# Patient Record
Sex: MALE | Race: ASIAN | NOT HISPANIC OR LATINO | URBAN - METROPOLITAN AREA
[De-identification: names, ages, dates, MRNs, and addresses within clinical notes are randomized per-mention and may not be internally consistent; named-entity substitution may affect disease eponyms.]

---

## 2019-02-01 ENCOUNTER — OUTPATIENT (OUTPATIENT)
Dept: OUTPATIENT SERVICES | Facility: HOSPITAL | Age: 69
LOS: 1 days | End: 2019-02-01
Payer: MEDICAID

## 2019-02-01 PROCEDURE — G9001: CPT

## 2019-02-08 ENCOUNTER — EMERGENCY (EMERGENCY)
Facility: HOSPITAL | Age: 69
LOS: 1 days | Discharge: HOME | End: 2019-02-08
Attending: EMERGENCY MEDICINE
Payer: MEDICARE

## 2019-02-08 VITALS
OXYGEN SATURATION: 99 % | SYSTOLIC BLOOD PRESSURE: 150 MMHG | TEMPERATURE: 99 F | HEART RATE: 85 BPM | DIASTOLIC BLOOD PRESSURE: 72 MMHG | WEIGHT: 119.93 LBS | HEIGHT: 62 IN | RESPIRATION RATE: 16 BRPM

## 2019-02-08 LAB
ALBUMIN SERPL ELPH-MCNC: 3.4 G/DL — LOW (ref 3.5–5)
ALP SERPL-CCNC: 85 U/L — SIGNIFICANT CHANGE UP (ref 40–120)
ALT FLD-CCNC: 29 U/L DA — SIGNIFICANT CHANGE UP (ref 10–60)
ANION GAP SERPL CALC-SCNC: 7 MMOL/L — SIGNIFICANT CHANGE UP (ref 5–17)
AST SERPL-CCNC: 17 U/L — SIGNIFICANT CHANGE UP (ref 10–40)
BASOPHILS # BLD AUTO: 0.1 K/UL — SIGNIFICANT CHANGE UP (ref 0–0.2)
BASOPHILS NFR BLD AUTO: 1.1 % — SIGNIFICANT CHANGE UP (ref 0–2)
BILIRUB SERPL-MCNC: 0.5 MG/DL — SIGNIFICANT CHANGE UP (ref 0.2–1.2)
BUN SERPL-MCNC: 18 MG/DL — SIGNIFICANT CHANGE UP (ref 7–18)
CALCIUM SERPL-MCNC: 8.9 MG/DL — SIGNIFICANT CHANGE UP (ref 8.4–10.5)
CHLORIDE SERPL-SCNC: 101 MMOL/L — SIGNIFICANT CHANGE UP (ref 96–108)
CO2 SERPL-SCNC: 25 MMOL/L — SIGNIFICANT CHANGE UP (ref 22–31)
CREAT SERPL-MCNC: 1.35 MG/DL — HIGH (ref 0.5–1.3)
EOSINOPHIL # BLD AUTO: 0.2 K/UL — SIGNIFICANT CHANGE UP (ref 0–0.5)
EOSINOPHIL NFR BLD AUTO: 1.8 % — SIGNIFICANT CHANGE UP (ref 0–6)
GLUCOSE SERPL-MCNC: 253 MG/DL — HIGH (ref 70–99)
HCT VFR BLD CALC: 40.9 % — SIGNIFICANT CHANGE UP (ref 39–50)
HGB BLD-MCNC: 13.3 G/DL — SIGNIFICANT CHANGE UP (ref 13–17)
LYMPHOCYTES # BLD AUTO: 1.1 K/UL — SIGNIFICANT CHANGE UP (ref 1–3.3)
LYMPHOCYTES # BLD AUTO: 8.9 % — LOW (ref 13–44)
MCHC RBC-ENTMCNC: 29.2 PG — SIGNIFICANT CHANGE UP (ref 27–34)
MCHC RBC-ENTMCNC: 32.6 GM/DL — SIGNIFICANT CHANGE UP (ref 32–36)
MCV RBC AUTO: 89.6 FL — SIGNIFICANT CHANGE UP (ref 80–100)
MONOCYTES # BLD AUTO: 0.9 K/UL — SIGNIFICANT CHANGE UP (ref 0–0.9)
MONOCYTES NFR BLD AUTO: 7.8 % — SIGNIFICANT CHANGE UP (ref 2–14)
NEUTROPHILS # BLD AUTO: 9.6 K/UL — HIGH (ref 1.8–7.4)
NEUTROPHILS NFR BLD AUTO: 80.3 % — HIGH (ref 43–77)
PLATELET # BLD AUTO: 295 K/UL — SIGNIFICANT CHANGE UP (ref 150–400)
POTASSIUM SERPL-MCNC: 4.3 MMOL/L — SIGNIFICANT CHANGE UP (ref 3.5–5.3)
POTASSIUM SERPL-SCNC: 4.3 MMOL/L — SIGNIFICANT CHANGE UP (ref 3.5–5.3)
PROT SERPL-MCNC: 7.9 G/DL — SIGNIFICANT CHANGE UP (ref 6–8.3)
RBC # BLD: 4.57 M/UL — SIGNIFICANT CHANGE UP (ref 4.2–5.8)
RBC # FLD: 11.5 % — SIGNIFICANT CHANGE UP (ref 10.3–14.5)
SODIUM SERPL-SCNC: 133 MMOL/L — LOW (ref 135–145)
URATE SERPL-MCNC: 7.6 MG/DL — SIGNIFICANT CHANGE UP (ref 3.4–8.8)
WBC # BLD: 11.9 K/UL — HIGH (ref 3.8–10.5)
WBC # FLD AUTO: 11.9 K/UL — HIGH (ref 3.8–10.5)

## 2019-02-08 PROCEDURE — 85027 COMPLETE CBC AUTOMATED: CPT

## 2019-02-08 PROCEDURE — 80053 COMPREHEN METABOLIC PANEL: CPT

## 2019-02-08 PROCEDURE — 84550 ASSAY OF BLOOD/URIC ACID: CPT

## 2019-02-08 PROCEDURE — 99283 EMERGENCY DEPT VISIT LOW MDM: CPT

## 2019-02-08 PROCEDURE — 99284 EMERGENCY DEPT VISIT MOD MDM: CPT

## 2019-02-08 PROCEDURE — 36415 COLL VENOUS BLD VENIPUNCTURE: CPT

## 2019-02-08 RX ORDER — CEPHALEXIN 500 MG
250 CAPSULE ORAL ONCE
Qty: 0 | Refills: 0 | Status: COMPLETED | OUTPATIENT
Start: 2019-02-08 | End: 2019-02-08

## 2019-02-08 RX ORDER — CEPHALEXIN 500 MG
1 CAPSULE ORAL
Qty: 40 | Refills: 0 | OUTPATIENT
Start: 2019-02-08 | End: 2019-02-17

## 2019-02-08 RX ORDER — AZTREONAM 2 G
2 VIAL (EA) INJECTION
Qty: 40 | Refills: 0 | OUTPATIENT
Start: 2019-02-08 | End: 2019-02-17

## 2019-02-08 RX ADMIN — Medication 500 MILLIGRAM(S): at 14:52

## 2019-02-08 RX ADMIN — Medication 500 MILLIGRAM(S): at 12:07

## 2019-02-08 RX ADMIN — Medication 2 TABLET(S): at 14:32

## 2019-02-08 RX ADMIN — Medication 250 MILLIGRAM(S): at 14:32

## 2019-02-08 NOTE — ED PROVIDER NOTE - PROGRESS NOTE DETAILS
feels much better. given bilateral ankle, possible arthritis. pt reports standing up all day at work. foot edema asymmetric, redness and warmth of right foot so will also treat for cellulitis. pt has access to pmd and plans to follow up in 2-3 days for continuity. gave copy of results. discussed results, answered questions. return if not improving, new sx or unable to follow up.

## 2019-02-08 NOTE — ED PROVIDER NOTE - SKIN, MLM
Skin normal color for race, warm, dry and intact. No evidence of rash. Right foot trace pitting edema, warmth and erythema

## 2019-02-08 NOTE — ED PROVIDER NOTE - CARE PLAN
Principal Discharge DX:	Bilateral ankle pain, unspecified chronicity  Secondary Diagnosis:	Cellulitis of right lower extremity

## 2019-02-08 NOTE — ED ADULT NURSE NOTE - NSIMPLEMENTINTERV_GEN_ALL_ED
Implemented All Fall Risk Interventions:  [place] to call system. Call bell, personal items and telephone within reach. Instruct patient to call for assistance. Room bathroom lighting operational. Non-slip footwear when patient is off stretcher. Physically safe environment: no spills, clutter or unnecessary equipment. Stretcher in lowest position, wheels locked, appropriate side rails in place. Provide visual cue, wrist band, yellow gown, etc. Monitor gait and stability. Monitor for mental status changes and reorient to person, place, and time. Review medications for side effects contributing to fall risk. Reinforce activity limits and safety measures with patient and family.

## 2019-02-08 NOTE — ED PROVIDER NOTE - OBJECTIVE STATEMENT
67 y/o male with PMHx of HTN, HLD, DM (on insulin), kidney disease presents to the ED c/o worsening kathryn ankle pain x 15 days. Pt notes he had similar Sx in May 2018, was found to be gout. Pt notes current Sx are similar to past gout. Pt denies fever, chills, or any other complaints. Pt denies trauma or falls. NKDA.

## 2019-02-10 ENCOUNTER — EMERGENCY (EMERGENCY)
Facility: HOSPITAL | Age: 69
LOS: 1 days | Discharge: HOME | End: 2019-02-10
Attending: EMERGENCY MEDICINE
Payer: MEDICARE

## 2019-02-10 VITALS
HEART RATE: 84 BPM | SYSTOLIC BLOOD PRESSURE: 144 MMHG | HEIGHT: 62 IN | OXYGEN SATURATION: 97 % | DIASTOLIC BLOOD PRESSURE: 64 MMHG | WEIGHT: 119.93 LBS | TEMPERATURE: 98 F

## 2019-02-10 PROCEDURE — 99285 EMERGENCY DEPT VISIT HI MDM: CPT

## 2019-02-10 RX ORDER — SODIUM CHLORIDE 9 MG/ML
3 INJECTION INTRAMUSCULAR; INTRAVENOUS; SUBCUTANEOUS ONCE
Qty: 0 | Refills: 0 | Status: COMPLETED | OUTPATIENT
Start: 2019-02-10 | End: 2019-02-10

## 2019-02-11 VITALS
TEMPERATURE: 98 F | HEART RATE: 75 BPM | SYSTOLIC BLOOD PRESSURE: 117 MMHG | DIASTOLIC BLOOD PRESSURE: 61 MMHG | RESPIRATION RATE: 18 BRPM | OXYGEN SATURATION: 99 %

## 2019-02-11 DIAGNOSIS — Z90.49 ACQUIRED ABSENCE OF OTHER SPECIFIED PARTS OF DIGESTIVE TRACT: Chronic | ICD-10-CM

## 2019-02-11 PROBLEM — N28.9 DISORDER OF KIDNEY AND URETER, UNSPECIFIED: Chronic | Status: INACTIVE | Noted: 2019-02-08 | Resolved: 2019-02-11

## 2019-02-11 PROBLEM — M10.9 GOUT, UNSPECIFIED: Chronic | Status: INACTIVE | Noted: 2019-02-08 | Resolved: 2019-02-11

## 2019-02-11 PROBLEM — E78.5 HYPERLIPIDEMIA, UNSPECIFIED: Chronic | Status: INACTIVE | Noted: 2019-02-08 | Resolved: 2019-02-11

## 2019-02-11 PROBLEM — I10 ESSENTIAL (PRIMARY) HYPERTENSION: Chronic | Status: INACTIVE | Noted: 2019-02-08 | Resolved: 2019-02-11

## 2019-02-11 PROBLEM — E11.9 TYPE 2 DIABETES MELLITUS WITHOUT COMPLICATIONS: Chronic | Status: INACTIVE | Noted: 2019-02-08 | Resolved: 2019-02-11

## 2019-02-11 LAB
ALBUMIN SERPL ELPH-MCNC: 3.4 G/DL — LOW (ref 3.5–5)
ALP SERPL-CCNC: 68 U/L — SIGNIFICANT CHANGE UP (ref 40–120)
ALT FLD-CCNC: 22 U/L DA — SIGNIFICANT CHANGE UP (ref 10–60)
ANION GAP SERPL CALC-SCNC: 9 MMOL/L — SIGNIFICANT CHANGE UP (ref 5–17)
APPEARANCE UR: CLEAR — SIGNIFICANT CHANGE UP
APTT BLD: 33.5 SEC — SIGNIFICANT CHANGE UP (ref 27.5–36.3)
AST SERPL-CCNC: 24 U/L — SIGNIFICANT CHANGE UP (ref 10–40)
BASOPHILS # BLD AUTO: 0.1 K/UL — SIGNIFICANT CHANGE UP (ref 0–0.2)
BASOPHILS NFR BLD AUTO: 1 % — SIGNIFICANT CHANGE UP (ref 0–2)
BILIRUB SERPL-MCNC: 0.3 MG/DL — SIGNIFICANT CHANGE UP (ref 0.2–1.2)
BILIRUB UR-MCNC: NEGATIVE — SIGNIFICANT CHANGE UP
BUN SERPL-MCNC: 26 MG/DL — HIGH (ref 7–18)
CALCIUM SERPL-MCNC: 8 MG/DL — LOW (ref 8.4–10.5)
CHLORIDE SERPL-SCNC: 102 MMOL/L — SIGNIFICANT CHANGE UP (ref 96–108)
CO2 SERPL-SCNC: 23 MMOL/L — SIGNIFICANT CHANGE UP (ref 22–31)
COLOR SPEC: YELLOW — SIGNIFICANT CHANGE UP
CREAT SERPL-MCNC: 1.71 MG/DL — HIGH (ref 0.5–1.3)
DIFF PNL FLD: NEGATIVE — SIGNIFICANT CHANGE UP
EOSINOPHIL # BLD AUTO: 0.3 K/UL — SIGNIFICANT CHANGE UP (ref 0–0.5)
EOSINOPHIL NFR BLD AUTO: 2.9 % — SIGNIFICANT CHANGE UP (ref 0–6)
GLUCOSE SERPL-MCNC: 61 MG/DL — LOW (ref 70–99)
GLUCOSE UR QL: NEGATIVE — SIGNIFICANT CHANGE UP
HCT VFR BLD CALC: 34 % — LOW (ref 39–50)
HGB BLD-MCNC: 11.3 G/DL — LOW (ref 13–17)
INR BLD: 1.19 RATIO — HIGH (ref 0.88–1.16)
KETONES UR-MCNC: NEGATIVE — SIGNIFICANT CHANGE UP
LEUKOCYTE ESTERASE UR-ACNC: NEGATIVE — SIGNIFICANT CHANGE UP
LIDOCAIN IGE QN: 92 U/L — SIGNIFICANT CHANGE UP (ref 73–393)
LYMPHOCYTES # BLD AUTO: 1.3 K/UL — SIGNIFICANT CHANGE UP (ref 1–3.3)
LYMPHOCYTES # BLD AUTO: 13.2 % — SIGNIFICANT CHANGE UP (ref 13–44)
MCHC RBC-ENTMCNC: 29.2 PG — SIGNIFICANT CHANGE UP (ref 27–34)
MCHC RBC-ENTMCNC: 33.2 GM/DL — SIGNIFICANT CHANGE UP (ref 32–36)
MCV RBC AUTO: 87.9 FL — SIGNIFICANT CHANGE UP (ref 80–100)
MONOCYTES # BLD AUTO: 0.9 K/UL — SIGNIFICANT CHANGE UP (ref 0–0.9)
MONOCYTES NFR BLD AUTO: 9.2 % — SIGNIFICANT CHANGE UP (ref 2–14)
NEUTROPHILS # BLD AUTO: 7.3 K/UL — SIGNIFICANT CHANGE UP (ref 1.8–7.4)
NEUTROPHILS NFR BLD AUTO: 73.7 % — SIGNIFICANT CHANGE UP (ref 43–77)
NITRITE UR-MCNC: NEGATIVE — SIGNIFICANT CHANGE UP
PH UR: 6.5 — SIGNIFICANT CHANGE UP (ref 5–8)
PLATELET # BLD AUTO: 310 K/UL — SIGNIFICANT CHANGE UP (ref 150–400)
POTASSIUM SERPL-MCNC: 4 MMOL/L — SIGNIFICANT CHANGE UP (ref 3.5–5.3)
POTASSIUM SERPL-SCNC: 4 MMOL/L — SIGNIFICANT CHANGE UP (ref 3.5–5.3)
PROT SERPL-MCNC: 6.9 G/DL — SIGNIFICANT CHANGE UP (ref 6–8.3)
PROT UR-MCNC: NEGATIVE — SIGNIFICANT CHANGE UP
PROTHROM AB SERPL-ACNC: 13.3 SEC — HIGH (ref 10–12.9)
RBC # BLD: 3.87 M/UL — LOW (ref 4.2–5.8)
RBC # FLD: 10.8 % — SIGNIFICANT CHANGE UP (ref 10.3–14.5)
SODIUM SERPL-SCNC: 134 MMOL/L — LOW (ref 135–145)
SP GR SPEC: 1.01 — SIGNIFICANT CHANGE UP (ref 1.01–1.02)
TROPONIN I SERPL-MCNC: <0.015 NG/ML — SIGNIFICANT CHANGE UP (ref 0–0.04)
UROBILINOGEN FLD QL: NEGATIVE — SIGNIFICANT CHANGE UP
WBC # BLD: 9.8 K/UL — SIGNIFICANT CHANGE UP (ref 3.8–10.5)
WBC # FLD AUTO: 9.8 K/UL — SIGNIFICANT CHANGE UP (ref 3.8–10.5)

## 2019-02-11 PROCEDURE — 74177 CT ABD & PELVIS W/CONTRAST: CPT | Mod: 26

## 2019-02-11 PROCEDURE — 36415 COLL VENOUS BLD VENIPUNCTURE: CPT

## 2019-02-11 PROCEDURE — 99284 EMERGENCY DEPT VISIT MOD MDM: CPT | Mod: 25

## 2019-02-11 PROCEDURE — 93005 ELECTROCARDIOGRAM TRACING: CPT

## 2019-02-11 PROCEDURE — 84484 ASSAY OF TROPONIN QUANT: CPT

## 2019-02-11 PROCEDURE — 85027 COMPLETE CBC AUTOMATED: CPT

## 2019-02-11 PROCEDURE — 74177 CT ABD & PELVIS W/CONTRAST: CPT

## 2019-02-11 PROCEDURE — 81003 URINALYSIS AUTO W/O SCOPE: CPT

## 2019-02-11 PROCEDURE — 85730 THROMBOPLASTIN TIME PARTIAL: CPT

## 2019-02-11 PROCEDURE — 85610 PROTHROMBIN TIME: CPT

## 2019-02-11 PROCEDURE — 87086 URINE CULTURE/COLONY COUNT: CPT

## 2019-02-11 PROCEDURE — 80053 COMPREHEN METABOLIC PANEL: CPT

## 2019-02-11 PROCEDURE — 96374 THER/PROPH/DIAG INJ IV PUSH: CPT | Mod: XU

## 2019-02-11 PROCEDURE — 83690 ASSAY OF LIPASE: CPT

## 2019-02-11 RX ORDER — MORPHINE SULFATE 50 MG/1
2 CAPSULE, EXTENDED RELEASE ORAL ONCE
Qty: 0 | Refills: 0 | Status: DISCONTINUED | OUTPATIENT
Start: 2019-02-11 | End: 2019-02-11

## 2019-02-11 RX ORDER — IOHEXOL 300 MG/ML
30 INJECTION, SOLUTION INTRAVENOUS ONCE
Qty: 0 | Refills: 0 | Status: COMPLETED | OUTPATIENT
Start: 2019-02-11 | End: 2019-02-11

## 2019-02-11 RX ORDER — SODIUM CHLORIDE 9 MG/ML
1000 INJECTION INTRAMUSCULAR; INTRAVENOUS; SUBCUTANEOUS ONCE
Qty: 0 | Refills: 0 | Status: COMPLETED | OUTPATIENT
Start: 2019-02-11 | End: 2019-02-11

## 2019-02-11 RX ORDER — POLYETHYLENE GLYCOL 3350 17 G/17G
17 POWDER, FOR SOLUTION ORAL
Qty: 238 | Refills: 0 | OUTPATIENT
Start: 2019-02-11 | End: 2019-02-20

## 2019-02-11 RX ADMIN — SODIUM CHLORIDE 1000 MILLILITER(S): 9 INJECTION INTRAMUSCULAR; INTRAVENOUS; SUBCUTANEOUS at 03:56

## 2019-02-11 RX ADMIN — MORPHINE SULFATE 2 MILLIGRAM(S): 50 CAPSULE, EXTENDED RELEASE ORAL at 03:55

## 2019-02-11 RX ADMIN — IOHEXOL 30 MILLILITER(S): 300 INJECTION, SOLUTION INTRAVENOUS at 02:11

## 2019-02-11 RX ADMIN — SODIUM CHLORIDE 3 MILLILITER(S): 9 INJECTION INTRAMUSCULAR; INTRAVENOUS; SUBCUTANEOUS at 02:03

## 2019-02-11 RX ADMIN — Medication 1 ENEMA: at 04:53

## 2019-02-11 RX ADMIN — MORPHINE SULFATE 2 MILLIGRAM(S): 50 CAPSULE, EXTENDED RELEASE ORAL at 04:09

## 2019-02-11 NOTE — ED ADULT NURSE NOTE - NSIMPLEMENTINTERV_GEN_ALL_ED
Implemented All Universal Safety Interventions:  [place] to call system. Call bell, personal items and telephone within reach. Instruct patient to call for assistance. Room bathroom lighting operational. Non-slip footwear when patient is off stretcher. Physically safe environment: no spills, clutter or unnecessary equipment. Stretcher in lowest position, wheels locked, appropriate side rails in place.

## 2019-02-11 NOTE — ED PROVIDER NOTE - PROGRESS NOTE DETAILS
labs unremarkable, UA neg, CT A/P shows no acute abnormality  discussed above with patient  in the setting of constipation and stool evident on CT, given fleet enema with stool output. Pt stable for discharge to f/u with PMD.

## 2019-02-11 NOTE — ED PROVIDER NOTE - OBJECTIVE STATEMENT
67 y/o M w/ PMHx of DM, HTN, HLD, gout and PSHx of cholecystectomy 10 years ago presents to ED w/ c/o abd bloating for 2 days w/ associated discomfort. Pt has no hx of symptoms like this. Endorses nausea, denies vomiting or diarrhea. Pt's last BM was two days ago, described normal. Pt denies any fever, cough, dysuria, hematuria, or any other complaints.

## 2019-02-11 NOTE — ED PROVIDER NOTE - MEDICAL DECISION MAKING DETAILS
67 y/o M here w/ abd distension, pain and constipation. Will obtain labs, UA, CT-scan of abd due to distension. Will give morphine for pain and re-assess.

## 2019-02-12 DIAGNOSIS — Z71.89 OTHER SPECIFIED COUNSELING: ICD-10-CM

## 2019-02-12 LAB
CULTURE RESULTS: NO GROWTH — SIGNIFICANT CHANGE UP
SPECIMEN SOURCE: SIGNIFICANT CHANGE UP

## 2019-02-27 ENCOUNTER — EMERGENCY (EMERGENCY)
Facility: HOSPITAL | Age: 69
LOS: 1 days | Discharge: HOME | End: 2019-02-27
Admitting: EMERGENCY MEDICINE
Payer: MEDICARE

## 2019-02-27 VITALS
TEMPERATURE: 98 F | HEART RATE: 74 BPM | DIASTOLIC BLOOD PRESSURE: 68 MMHG | SYSTOLIC BLOOD PRESSURE: 130 MMHG | RESPIRATION RATE: 16 BRPM | OXYGEN SATURATION: 100 %

## 2019-02-27 VITALS
OXYGEN SATURATION: 100 % | WEIGHT: 119.93 LBS | TEMPERATURE: 99 F | DIASTOLIC BLOOD PRESSURE: 65 MMHG | SYSTOLIC BLOOD PRESSURE: 135 MMHG | RESPIRATION RATE: 16 BRPM | HEART RATE: 75 BPM

## 2019-02-27 DIAGNOSIS — Z90.49 ACQUIRED ABSENCE OF OTHER SPECIFIED PARTS OF DIGESTIVE TRACT: Chronic | ICD-10-CM

## 2019-02-27 LAB
ALBUMIN SERPL ELPH-MCNC: 4.1 G/DL — SIGNIFICANT CHANGE UP (ref 3.3–5)
ALP SERPL-CCNC: 78 U/L — SIGNIFICANT CHANGE UP (ref 40–120)
ALT FLD-CCNC: 22 U/L — SIGNIFICANT CHANGE UP (ref 4–41)
ANION GAP SERPL CALC-SCNC: 13 MMO/L — SIGNIFICANT CHANGE UP (ref 7–14)
AST SERPL-CCNC: 23 U/L — SIGNIFICANT CHANGE UP (ref 4–40)
BASOPHILS # BLD AUTO: 0.08 K/UL — SIGNIFICANT CHANGE UP (ref 0–0.2)
BASOPHILS NFR BLD AUTO: 0.9 % — SIGNIFICANT CHANGE UP (ref 0–2)
BILIRUB SERPL-MCNC: 0.4 MG/DL — SIGNIFICANT CHANGE UP (ref 0.2–1.2)
BUN SERPL-MCNC: 19 MG/DL — SIGNIFICANT CHANGE UP (ref 7–23)
CALCIUM SERPL-MCNC: 9.8 MG/DL — SIGNIFICANT CHANGE UP (ref 8.4–10.5)
CHLORIDE SERPL-SCNC: 99 MMOL/L — SIGNIFICANT CHANGE UP (ref 98–107)
CO2 SERPL-SCNC: 24 MMOL/L — SIGNIFICANT CHANGE UP (ref 22–31)
CREAT SERPL-MCNC: 1.43 MG/DL — HIGH (ref 0.5–1.3)
EOSINOPHIL # BLD AUTO: 0.45 K/UL — SIGNIFICANT CHANGE UP (ref 0–0.5)
EOSINOPHIL NFR BLD AUTO: 5 % — SIGNIFICANT CHANGE UP (ref 0–6)
GLUCOSE SERPL-MCNC: 109 MG/DL — HIGH (ref 70–99)
HCT VFR BLD CALC: 41.7 % — SIGNIFICANT CHANGE UP (ref 39–50)
HGB BLD-MCNC: 13.3 G/DL — SIGNIFICANT CHANGE UP (ref 13–17)
IMM GRANULOCYTES NFR BLD AUTO: 0.3 % — SIGNIFICANT CHANGE UP (ref 0–1.5)
LDH SERPL L TO P-CCNC: 206 U/L — SIGNIFICANT CHANGE UP (ref 135–225)
LYMPHOCYTES # BLD AUTO: 1.22 K/UL — SIGNIFICANT CHANGE UP (ref 1–3.3)
LYMPHOCYTES # BLD AUTO: 13.6 % — SIGNIFICANT CHANGE UP (ref 13–44)
MCHC RBC-ENTMCNC: 28.9 PG — SIGNIFICANT CHANGE UP (ref 27–34)
MCHC RBC-ENTMCNC: 31.9 % — LOW (ref 32–36)
MCV RBC AUTO: 90.7 FL — SIGNIFICANT CHANGE UP (ref 80–100)
MONOCYTES # BLD AUTO: 0.83 K/UL — SIGNIFICANT CHANGE UP (ref 0–0.9)
MONOCYTES NFR BLD AUTO: 9.2 % — SIGNIFICANT CHANGE UP (ref 2–14)
NEUTROPHILS # BLD AUTO: 6.37 K/UL — SIGNIFICANT CHANGE UP (ref 1.8–7.4)
NEUTROPHILS NFR BLD AUTO: 71 % — SIGNIFICANT CHANGE UP (ref 43–77)
NRBC # FLD: 0 K/UL — LOW (ref 25–125)
PLATELET # BLD AUTO: 221 K/UL — SIGNIFICANT CHANGE UP (ref 150–400)
PMV BLD: 10.9 FL — SIGNIFICANT CHANGE UP (ref 7–13)
POTASSIUM SERPL-MCNC: 4.8 MMOL/L — SIGNIFICANT CHANGE UP (ref 3.5–5.3)
POTASSIUM SERPL-SCNC: 4.8 MMOL/L — SIGNIFICANT CHANGE UP (ref 3.5–5.3)
PROT SERPL-MCNC: 7.1 G/DL — SIGNIFICANT CHANGE UP (ref 6–8.3)
RBC # BLD: 4.6 M/UL — SIGNIFICANT CHANGE UP (ref 4.2–5.8)
RBC # FLD: 12.7 % — SIGNIFICANT CHANGE UP (ref 10.3–14.5)
SODIUM SERPL-SCNC: 136 MMOL/L — SIGNIFICANT CHANGE UP (ref 135–145)
WBC # BLD: 8.98 K/UL — SIGNIFICANT CHANGE UP (ref 3.8–10.5)
WBC # FLD AUTO: 8.98 K/UL — SIGNIFICANT CHANGE UP (ref 3.8–10.5)

## 2019-02-27 PROCEDURE — 99284 EMERGENCY DEPT VISIT MOD MDM: CPT

## 2019-02-27 PROCEDURE — 99284 EMERGENCY DEPT VISIT MOD MDM: CPT | Mod: GC

## 2019-02-27 PROCEDURE — 70486 CT MAXILLOFACIAL W/O DYE: CPT | Mod: 26

## 2019-02-27 NOTE — ED PROVIDER NOTE - PROGRESS NOTE DETAILS
ALEX Arias: Spoke w/ oncology fellow Dr. Barnes who recommended CT max face to better evaluate the mass. Was unable to use contrast due to pts renal dysfunction. Pt seen by oncology who recommended ENT consult to help establish f/u. Spoke w/ ENT resident, state the most they can do is email the head and neck care coordinator Lula Zimmerman to help arrange follow up. Asked that I be cc'ed on the email to follow up whether pt was actually able to schedule an appt. Discussed ct max face results with pt and family, understand we have concerns this may be malignant. Will give both ENT clinic and oncology numbers for outpatient follow up. All questions answered

## 2019-02-27 NOTE — CONSULT NOTE ADULT - ATTENDING COMMENTS
Pt with an enlarging preauricular mass concerning for malignancy. CT imaging reviewed. Agree with MRI and CT neck. He has CKD and therefore contrast cant be administered. ENT eval for tissue bx.

## 2019-02-27 NOTE — CONSULT NOTE ADULT - ASSESSMENT
69 y/o M PMHx HTN, HLD, DM, renal dz here c/o left pre-auricular mass x  1 years.    # Pre-auricular mass  - L periauricular 1-2 cm, fixed, hard nodule. No erythema, no exudates, no ear drainage. No palpable cervical LAD.  - CT w/o contrast showed transpatial mass centered along the left superficial parotid lobe with findings suspicious for perineural spread of disease and dermal involvement of disease.  - Pt will need bx of the mass.  -pt's son tried to set up outpt appointment with ENT but the earliest appointment available was in April 2019  -please consult ENT to set up out patient biopsy within the next 2 weeks.      David Barnes MD.  Heme-Onc fellow, PGY 4  [phone]; [id number] 69 y/o M PMHx HTN, HLD, DM, renal dz here c/o left pre-auricular mass x  1 years.    # Pre-auricular mass  -Exam showed L preauricular 1-2 cm, fixed, hard nodule. No erythema, no exudates, no ear drainage. No palpable cervical LAD.  -CT w/o contrast showed transpatial mass centered along the left superficial parotid lobe with findings suspicious for perineural spread of disease and dermal involvement of disease.  -CBC WNL with normal differential, normal LDH unlikely lymphoma.    -Pt will need bx of the mass.  -pt's son tried to set up outpt appointment with ENT but the earliest appointment available was in April 2019  -please consult ENT to set up out patient biopsy within the next 2 weeks.      David Barnes MD.  Heme-Onc fellow, PGY 4  [phone]; [id number]

## 2019-02-27 NOTE — ED PROVIDER NOTE - NSFOLLOWUPINSTRUCTIONS_ED_ALL_ED_FT
See your primary care doctor within 24-48 hours for a post hospital visit, bring copies of all reports with you. The "head and neck surgery" coordinator will call you to schedule an appointment to see a surgeon. If they do not call, please contact the ENT clinic at [phone] to schedule an appointment. You may also call [phone] to make an oncology appointment. Return to the ER for worsening symptoms, fevers or any other concerns.

## 2019-02-27 NOTE — CONSULT NOTE ADULT - SUBJECTIVE AND OBJECTIVE BOX
69 y/o M PMHx HTN, HLD, DM, renal dz here c/o left pre-auricular mass x  1 years. The size increased rapidly recently with worsening pain to the area. Saw his PMD a couple of days ago, who recommended he f/u w/ ENT for bx and further evaluation. Pt and son tried to schedule an appointment, however pt has Medicaid and he next appt to see an ENT who accepts Medicaid is April/May. Pt came here for help facilitating work up. Pt denies fevers, chills, night sweats, weight loss or any other complaints.      Allergies  No Known Allergies    PAST MEDICAL & SURGICAL HISTORY:  Kidney disease  DM (diabetes mellitus)  Hypertension, unspecified type      FAMILY HISTORY:  Brother had sinonasal cancer    Social History:  From [facility], never smoker, never drinker.    REVIEW OF SYSTEMS:    CONSTITUTIONAL: No weakness, fevers or chills  EYES/ENT: No visual changes, no throat pain   RESPIRATORY: No cough, wheezing, hemoptysis; No shortness of breath  CARDIOVASCULAR: No chest pain or palpitations  GASTROINTESTINAL: No abdominal pain, nausea, vomiting, or hematemesis; No diarrhea or constipation. No melena or hematochezia.  GENITOURINARY: No dysuria, frequency or hematuria  MUSCULOSKELETAL: No joint pain.  NEUROLOGICAL: No dizziness, numbness, or weakness  SKIN: No itching, burning, rashes, or lesions   All other review of systems is negative unless indicated above.    VITAL SIGNS:  Vital Signs Last 24 Hrs  T(C): 36.8 (27 Feb 2019 16:02), Max: 37.1 (27 Feb 2019 11:02)  T(F): 98.3 (27 Feb 2019 16:02), Max: 98.7 (27 Feb 2019 11:02)  HR: 74 (27 Feb 2019 16:02) (74 - 75)  BP: 130/68 (27 Feb 2019 16:02) (130/68 - 135/65)  BP(mean): --  RR: 16 (27 Feb 2019 16:02) (16 - 16)  SpO2: 100% (27 Feb 2019 16:02) (100% - 100%)      PHYSICAL EXAM:     GENERAL: no acute distress  HEENT: L periauricular 1-2 cm, fixed,hard nodule. No erythema, no exudates, no eardrainage.  RESPIRATORY: LCTAB/L, no rhonchi, rales, or wheezing  CARDIOVASCULAR: RRR, no murmurs, gallops, rubs  ABDOMINAL: soft, non-tender, non-distended, positive bowel sounds   EXTREMITIES: no clubbing, cyanosis, or edema  NEUROLOGICAL: alert and oriented x 3, non-focal  SKIN: no rashes or lesions   MUSCULOSKELETAL: no gross joint deformity                          13.3   8.98  )-----------( 221      ( 27 Feb 2019 14:00 )             41.7     02-27    136  |  99  |  19  ----------------------------<  109<H>  4.8   |  24  |  1.43<H>    Ca    9.8      27 Feb 2019 14:00    TPro  7.1  /  Alb  4.1  /  TBili  0.4  /  DBili  x   /  AST  23  /  ALT  22  /  AlkPhos  78  02-27      MEDICATIONS  (STANDING):    < from: CT Maxillofacial No Cont (02.27.19 @ 15:32) >  Transpatial mass centered along the left superficial parotid lobe with   findings suspicious for perineural spread of disease and dermal   involvement of disease. An infectious process may be considered in the   appropriate clinical setting. Contrast-enhanced MR imaging is recommended   for further evaluation of local regional disease extent. Dedicated neck   imaging may be done to evaluate for neck lymphadenopathy.    < end of copied text > 67 y/o M PMHx HTN, HLD, DM, renal dz here c/o left pre-auricular mass x  1 years. The size increased rapidly recently with worsening pain to the area. Saw his PMD a couple of days ago, who recommended he f/u w/ ENT for bx and further evaluation. Pt and son tried to schedule an appointment, however pt has Medicaid and he next appt to see an ENT who accepts Medicaid is April/May. Pt came here for help facilitating work up. Pt denies fevers, chills, night sweats, weight loss or any other complaints.      Allergies  No Known Allergies    PAST MEDICAL & SURGICAL HISTORY:  Kidney disease  DM (diabetes mellitus)  Hypertension, unspecified type      FAMILY HISTORY:  Brother had sinonasal cancer  No cancer in mother or father    Social History:  From [facility], never smoker, never drinker.    REVIEW OF SYSTEMS:    CONSTITUTIONAL: No weakness, fevers or chills  EYES/ENT: No visual changes, no throat pain   RESPIRATORY: No cough, wheezing, hemoptysis; No shortness of breath  CARDIOVASCULAR: No chest pain or palpitations  GASTROINTESTINAL: No abdominal pain, nausea, vomiting, or hematemesis; No diarrhea or constipation. No melena or hematochezia.  GENITOURINARY: No dysuria, frequency or hematuria  MUSCULOSKELETAL: No joint pain.  NEUROLOGICAL: No dizziness, numbness, or weakness  SKIN: No itching, burning, rashes, or lesions   All other review of systems is negative unless indicated above.    VITAL SIGNS:  Vital Signs Last 24 Hrs  T(C): 36.8 (27 Feb 2019 16:02), Max: 37.1 (27 Feb 2019 11:02)  T(F): 98.3 (27 Feb 2019 16:02), Max: 98.7 (27 Feb 2019 11:02)  HR: 74 (27 Feb 2019 16:02) (74 - 75)  BP: 130/68 (27 Feb 2019 16:02) (130/68 - 135/65)  BP(mean): --  RR: 16 (27 Feb 2019 16:02) (16 - 16)  SpO2: 100% (27 Feb 2019 16:02) (100% - 100%)      PHYSICAL EXAM:     GENERAL: no acute distress  HEENT: L periauricular 1-2 cm, fixed,hard nodule. No erythema, no exudates, no eardrainage.  RESPIRATORY: LCTAB/L, no rhonchi, rales, or wheezing  CARDIOVASCULAR: RRR, no murmurs, gallops, rubs  ABDOMINAL: soft, non-tender, non-distended, positive bowel sounds   EXTREMITIES: no clubbing, cyanosis, or edema  NEUROLOGICAL: alert and oriented x 3, non-focal  SKIN: no rashes or lesions   MUSCULOSKELETAL: no gross joint deformity                          13.3   8.98  )-----------( 221      ( 27 Feb 2019 14:00 )             41.7     02-27    136  |  99  |  19  ----------------------------<  109<H>  4.8   |  24  |  1.43<H>    Ca    9.8      27 Feb 2019 14:00    TPro  7.1  /  Alb  4.1  /  TBili  0.4  /  DBili  x   /  AST  23  /  ALT  22  /  AlkPhos  78  02-27      MEDICATIONS  (STANDING):    < from: CT Maxillofacial No Cont (02.27.19 @ 15:32) >  Transpatial mass centered along the left superficial parotid lobe with   findings suspicious for perineural spread of disease and dermal   involvement of disease. An infectious process may be considered in the   appropriate clinical setting. Contrast-enhanced MR imaging is recommended   for further evaluation of local regional disease extent. Dedicated neck   imaging may be done to evaluate for neck lymphadenopathy.    < end of copied text >

## 2019-02-27 NOTE — ED PROVIDER NOTE - CLINICAL SUMMARY MEDICAL DECISION MAKING FREE TEXT BOX
67 y/o M w/ fixed preauricular mass, unable to schedule outpatient follow up. Will call heme/onc fellow for recommendations

## 2019-02-27 NOTE — ED PROVIDER NOTE - OBJECTIVE STATEMENT
69 y/o M PMHx HTN, HLD, DM, renal dz here c/o left pre-auricular mass x  1 years, with worsening pain to the area for months. Saw his PMD a couple of days ago, who recommended he f/u w/ ENT for bx and further evaluation. Pt and son tried to schedule an appointment, however pt has Medicaid and he next appt to see an ENT who accepts Medicaid is several months out. Pt came here for help facilitating work up. Pt denies fevers, chills, night sweats, weight loss or any other complaints.

## 2019-02-28 PROBLEM — E78.5 HYPERLIPIDEMIA, UNSPECIFIED: Chronic | Status: ACTIVE | Noted: 2019-02-11

## 2019-02-28 PROBLEM — N28.9 DISORDER OF KIDNEY AND URETER, UNSPECIFIED: Chronic | Status: ACTIVE | Noted: 2019-02-11

## 2019-02-28 PROBLEM — M10.9 GOUT, UNSPECIFIED: Chronic | Status: ACTIVE | Noted: 2019-02-11

## 2019-02-28 PROBLEM — I10 ESSENTIAL (PRIMARY) HYPERTENSION: Chronic | Status: ACTIVE | Noted: 2019-02-11

## 2019-03-06 PROBLEM — Z00.00 ENCOUNTER FOR PREVENTIVE HEALTH EXAMINATION: Status: ACTIVE | Noted: 2019-03-06

## 2019-03-07 PROBLEM — N28.9 DISORDER OF KIDNEY AND URETER, UNSPECIFIED: Chronic | Status: ACTIVE | Noted: 2019-02-27

## 2019-03-07 PROBLEM — E11.9 TYPE 2 DIABETES MELLITUS WITHOUT COMPLICATIONS: Chronic | Status: ACTIVE | Noted: 2019-02-27

## 2019-03-07 PROBLEM — I10 ESSENTIAL (PRIMARY) HYPERTENSION: Chronic | Status: ACTIVE | Noted: 2019-02-27

## 2019-03-14 ENCOUNTER — OUTPATIENT (OUTPATIENT)
Dept: OUTPATIENT SERVICES | Facility: HOSPITAL | Age: 69
LOS: 1 days | Discharge: HOME | End: 2019-03-14

## 2019-03-14 ENCOUNTER — APPOINTMENT (OUTPATIENT)
Dept: OTOLARYNGOLOGY | Facility: CLINIC | Age: 69
End: 2019-03-14
Payer: MEDICAID

## 2019-03-14 VITALS
HEIGHT: 62 IN | WEIGHT: 120 LBS | HEART RATE: 78 BPM | DIASTOLIC BLOOD PRESSURE: 71 MMHG | SYSTOLIC BLOOD PRESSURE: 154 MMHG | BODY MASS INDEX: 22.08 KG/M2

## 2019-03-14 DIAGNOSIS — Z90.49 ACQUIRED ABSENCE OF OTHER SPECIFIED PARTS OF DIGESTIVE TRACT: Chronic | ICD-10-CM

## 2019-03-14 DIAGNOSIS — Z83.3 FAMILY HISTORY OF DIABETES MELLITUS: ICD-10-CM

## 2019-03-14 DIAGNOSIS — Z86.79 PERSONAL HISTORY OF OTHER DISEASES OF THE CIRCULATORY SYSTEM: ICD-10-CM

## 2019-03-14 DIAGNOSIS — E11.9 TYPE 2 DIABETES MELLITUS W/OUT COMPLICATIONS: ICD-10-CM

## 2019-03-14 DIAGNOSIS — Z78.9 OTHER SPECIFIED HEALTH STATUS: ICD-10-CM

## 2019-03-14 DIAGNOSIS — Z86.39 PERSONAL HISTORY OF OTHER ENDOCRINE, NUTRITIONAL AND METABOLIC DISEASE: ICD-10-CM

## 2019-03-14 DIAGNOSIS — Z87.448 PERSONAL HISTORY OF OTHER DISEASES OF URINARY SYSTEM: ICD-10-CM

## 2019-03-14 PROCEDURE — 99204 OFFICE O/P NEW MOD 45 MIN: CPT

## 2019-03-14 RX ORDER — ATORVASTATIN CALCIUM 10 MG/1
10 TABLET, FILM COATED ORAL
Refills: 0 | Status: ACTIVE | COMMUNITY

## 2019-03-14 RX ORDER — ASPIRIN 81 MG
81 TABLET, DELAYED RELEASE (ENTERIC COATED) ORAL
Refills: 0 | Status: ACTIVE | COMMUNITY

## 2019-03-14 RX ORDER — INSULIN GLARGINE 100 [IU]/ML
INJECTION, SOLUTION SUBCUTANEOUS
Refills: 0 | Status: ACTIVE | COMMUNITY

## 2019-03-16 NOTE — HISTORY OF PRESENT ILLNESS
[de-identified] : 68 year old male referred by ED for left facial mass.  States left facial mass has been there for more than a year and has gotten larger.  On 2/27/19 went to ER due to swelling.  Sometimes it is painful.  CT scan conducted 2/27/19. No fever, weight loss or weakness.

## 2019-03-16 NOTE — PHYSICAL EXAM
[Midline] : trachea located in midline position [Normal] : no rashes [de-identified] : 3 cm semi fixed lesion of the L parotid, at the level of the ear lobe which infiltrates the skin.

## 2019-03-16 NOTE — REASON FOR VISIT
[Initial Evaluation] : an initial evaluation for [Spouse] : spouse [Family Member] : family member [FreeTextEntry2] : referred by ED for left facial mass

## 2019-03-19 ENCOUNTER — FORM ENCOUNTER (OUTPATIENT)
Age: 69
End: 2019-03-19

## 2019-03-19 DIAGNOSIS — K11.9 DISEASE OF SALIVARY GLAND, UNSPECIFIED: ICD-10-CM

## 2019-03-20 ENCOUNTER — OUTPATIENT (OUTPATIENT)
Dept: OUTPATIENT SERVICES | Facility: HOSPITAL | Age: 69
LOS: 1 days | End: 2019-03-20
Payer: MEDICARE

## 2019-03-20 ENCOUNTER — APPOINTMENT (OUTPATIENT)
Dept: ULTRASOUND IMAGING | Facility: IMAGING CENTER | Age: 69
End: 2019-03-20
Payer: MEDICAID

## 2019-03-20 ENCOUNTER — RESULT REVIEW (OUTPATIENT)
Age: 69
End: 2019-03-20

## 2019-03-20 DIAGNOSIS — K11.9 DISEASE OF SALIVARY GLAND, UNSPECIFIED: ICD-10-CM

## 2019-03-20 DIAGNOSIS — Z90.49 ACQUIRED ABSENCE OF OTHER SPECIFIED PARTS OF DIGESTIVE TRACT: Chronic | ICD-10-CM

## 2019-03-20 PROCEDURE — 10005 FNA BX W/US GDN 1ST LES: CPT

## 2019-03-20 PROCEDURE — 88341 IMHCHEM/IMCYTCHM EA ADD ANTB: CPT

## 2019-03-20 PROCEDURE — 88342 IMHCHEM/IMCYTCHM 1ST ANTB: CPT | Mod: 26

## 2019-03-20 PROCEDURE — 88173 CYTOPATH EVAL FNA REPORT: CPT | Mod: 26

## 2019-03-20 PROCEDURE — 88173 CYTOPATH EVAL FNA REPORT: CPT

## 2019-03-20 PROCEDURE — 88341 IMHCHEM/IMCYTCHM EA ADD ANTB: CPT | Mod: 26

## 2019-03-20 PROCEDURE — 88342 IMHCHEM/IMCYTCHM 1ST ANTB: CPT

## 2019-03-20 PROCEDURE — 88305 TISSUE EXAM BY PATHOLOGIST: CPT | Mod: 26

## 2019-03-20 PROCEDURE — 88172 CYTP DX EVAL FNA 1ST EA SITE: CPT

## 2019-03-20 PROCEDURE — 88305 TISSUE EXAM BY PATHOLOGIST: CPT

## 2019-04-10 ENCOUNTER — OUTPATIENT (OUTPATIENT)
Dept: OUTPATIENT SERVICES | Facility: HOSPITAL | Age: 69
LOS: 1 days | End: 2019-04-10

## 2019-04-10 VITALS
HEART RATE: 69 BPM | TEMPERATURE: 97 F | HEIGHT: 60 IN | DIASTOLIC BLOOD PRESSURE: 68 MMHG | WEIGHT: 130.07 LBS | RESPIRATION RATE: 16 BRPM | SYSTOLIC BLOOD PRESSURE: 130 MMHG | OXYGEN SATURATION: 98 %

## 2019-04-10 DIAGNOSIS — K11.9 DISEASE OF SALIVARY GLAND, UNSPECIFIED: ICD-10-CM

## 2019-04-10 DIAGNOSIS — Z90.49 ACQUIRED ABSENCE OF OTHER SPECIFIED PARTS OF DIGESTIVE TRACT: Chronic | ICD-10-CM

## 2019-04-10 DIAGNOSIS — H26.9 UNSPECIFIED CATARACT: Chronic | ICD-10-CM

## 2019-04-10 DIAGNOSIS — Z95.0 PRESENCE OF CARDIAC PACEMAKER: Chronic | ICD-10-CM

## 2019-04-10 DIAGNOSIS — E11.9 TYPE 2 DIABETES MELLITUS WITHOUT COMPLICATIONS: ICD-10-CM

## 2019-04-10 DIAGNOSIS — Z95.0 PRESENCE OF CARDIAC PACEMAKER: ICD-10-CM

## 2019-04-10 DIAGNOSIS — I25.10 ATHEROSCLEROTIC HEART DISEASE OF NATIVE CORONARY ARTERY WITHOUT ANGINA PECTORIS: ICD-10-CM

## 2019-04-10 DIAGNOSIS — K08.89 OTHER SPECIFIED DISORDERS OF TEETH AND SUPPORTING STRUCTURES: ICD-10-CM

## 2019-04-10 LAB
ANION GAP SERPL CALC-SCNC: 12 MMO/L — SIGNIFICANT CHANGE UP (ref 7–14)
BLD GP AB SCN SERPL QL: NEGATIVE — SIGNIFICANT CHANGE UP
BUN SERPL-MCNC: 21 MG/DL — SIGNIFICANT CHANGE UP (ref 7–23)
CALCIUM SERPL-MCNC: 9.5 MG/DL — SIGNIFICANT CHANGE UP (ref 8.4–10.5)
CHLORIDE SERPL-SCNC: 101 MMOL/L — SIGNIFICANT CHANGE UP (ref 98–107)
CO2 SERPL-SCNC: 22 MMOL/L — SIGNIFICANT CHANGE UP (ref 22–31)
CREAT SERPL-MCNC: 1.23 MG/DL — SIGNIFICANT CHANGE UP (ref 0.5–1.3)
GLUCOSE SERPL-MCNC: 253 MG/DL — HIGH (ref 70–99)
HBA1C BLD-MCNC: 7.3 % — HIGH (ref 4–5.6)
HCT VFR BLD CALC: 39.9 % — SIGNIFICANT CHANGE UP (ref 39–50)
HGB BLD-MCNC: 13.2 G/DL — SIGNIFICANT CHANGE UP (ref 13–17)
MCHC RBC-ENTMCNC: 29.5 PG — SIGNIFICANT CHANGE UP (ref 27–34)
MCHC RBC-ENTMCNC: 33.1 % — SIGNIFICANT CHANGE UP (ref 32–36)
MCV RBC AUTO: 89.3 FL — SIGNIFICANT CHANGE UP (ref 80–100)
NRBC # FLD: 0 K/UL — SIGNIFICANT CHANGE UP (ref 0–0)
PLATELET # BLD AUTO: 213 K/UL — SIGNIFICANT CHANGE UP (ref 150–400)
PMV BLD: 11.6 FL — SIGNIFICANT CHANGE UP (ref 7–13)
POTASSIUM SERPL-MCNC: 4.2 MMOL/L — SIGNIFICANT CHANGE UP (ref 3.5–5.3)
POTASSIUM SERPL-SCNC: 4.2 MMOL/L — SIGNIFICANT CHANGE UP (ref 3.5–5.3)
RBC # BLD: 4.47 M/UL — SIGNIFICANT CHANGE UP (ref 4.2–5.8)
RBC # FLD: 13.2 % — SIGNIFICANT CHANGE UP (ref 10.3–14.5)
RH IG SCN BLD-IMP: POSITIVE — SIGNIFICANT CHANGE UP
SODIUM SERPL-SCNC: 135 MMOL/L — SIGNIFICANT CHANGE UP (ref 135–145)
WBC # BLD: 6.86 K/UL — SIGNIFICANT CHANGE UP (ref 3.8–10.5)
WBC # FLD AUTO: 6.86 K/UL — SIGNIFICANT CHANGE UP (ref 3.8–10.5)

## 2019-04-10 NOTE — H&P PST ADULT - HISTORY OF PRESENT ILLNESS
Pt is a 68 y.o. male ; pts primary language is Turkish ; information obtained via pts son Olegario Villa. 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 pt states " it increased in size ; pt to ER 1 month ago a c/t scan was done ; pt referred to surgeon ; pt now presents for Left superficial parotidectomy Possible Total Parotidectomy with Possible Left Neck Dissection Pt is a 68 y.o. male ; pts primary language is Frisian ; information obtained via pts son Olegario Villa. 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 pt states " it increased in size ; pt to ER 1 month ago a c/t scan was done ; pt referred to surgeon ; pt now presents for Left Superficial Parotidectomy Possible Total Parotidectomy with Possible Left Neck Dissection

## 2019-04-10 NOTE — H&P PST ADULT - FEMALE-SPECIFIC SYMPTOMS
Physical Therapy Daily Treatment Note    Date:  2018    Patient Name:  Jayshree White    :  1957  MRN: [id number]  Restrictions/Precautions:     Medical/Treatment Diagnosis Information:   · Diagnosis: Right Shoulder RTC Repair     Insurance/Certification information:  PT Insurance Information: BCBS - [place] PPO  Physician Information:  Referring Practitioner: Dr Carolina Roberts of care signed (Y/N):  n  Visit# / total visits: 4/10  Pain level: 6/10     G-Code (if applicable):      Date G-Code Applied:  10/30/18  PT G-Codes  Functional Assessment Tool Used: UEFI   Score: 1680=80% Disabitity   Functional Limitation: Carrying, moving and handling objects  Carrying, Moving and Handling Objects Current Status (): At least 80 percent but less than 100 percent impaired, limited or restricted  Carrying, Moving and Handling Objects Goal Status (): At least 1 percent but less than 20 percent impaired, limited or restricted    Time In: 10:30   Time Out: 11:13    Progress Note: []  Yes  [x]  No  Next due by: Visit #10      Subjective: Pt rpts to clinic with moderate complaints of R shoulder pain stating \"Its gotten a little better. I have a really bad rash under my R breast and I'm not sure what its from\". Instructed patient to report to urgent care after appointment for tx of red blotchy skin in R sided torso. Objective: Pt tolerated todays session well indicating no increase in pain post session and exhibiting very good tolerable PROM in R shoulder. Pt was able to initiate wrist flx/ext, and pulleys this date requiring vc to ensure proper performance. No rest breaks needed. Observation: Rpts with sling donned. Test measurements:      Exercises: Performed all RASHID per flow chart to increase R shoulder ROM and strength.    Exercise/Equipment Resistance/Repetitions Other comments   pulleys 5'    PROM  10'    Stress ball squeeze 3' HEP   pendulums 1' ea 4 way   Wrist pro/sup x30 ea 3#   Wrist maze x15    Walk backs at wall 3'    Wrist flx/ext x20 3#                                  [x] Provided verbal/tactile cueing for activities related to strengthening, flexibility, endurance, ROM. (12501)  [] Provided verbal/tactile cueing for activities related to improving balance, coordination, kinesthetic sense, posture, motor skill, proprioception. (72334)    Therapeutic Activities:     [] Therapeutic activities, direct (one-on-one) patient contact (use of dynamic activities to improve functional performance). (69749)    Gait:   [] Provided training and instruction to the patient for ambulation re-education. (62418)    Self-Care/ADL's  [] Self-care/home management training and compensatory training, meal preparation, safety procedures, and instructions in use of assistive technology devices/adaptive equipment, direct one-on-one contact. (23586)    Home Exercise Program: Pt given pendulums, ball squeeze. [x] Reviewed/Progressed HEP activities related to strengthening, flexibility, endurance, ROM. (61482)  [] Reviewed/Progressed HEP activities related to improving balance, coordination, kinesthetic sense, posture, motor skill, proprioception.  (05562)    Manual Treatments: PROM to R shoulder x 10'. [x] Provided manual therapy to mobilize soft tissue/joints for the purpose of modulating pain, promoting relaxation,  increasing ROM, reducing/eliminating soft tissue swelling/inflammation/restriction, improving soft tissue extensibility.  (91331)    Service Based Modalities:      Timed Code Treatment Minutes: 35' therex       10' manual      Total Treatment Minutes:   37'    Treatment/Activity Tolerance:  [] Patient tolerated treatment well [] Patient limited by fatique  [x] Patient limited by pain  [] Patient limited by other medical complications  [] Other:     Prognosis: [x] Good [] Fair  [] Poor    Patient Requires Follow-up: [x] Yes  [] No      Goals:  Short term goals  Time Frame for Short term goals: 3weeks   Short term not applicable

## 2019-04-10 NOTE — H&P PST ADULT - NEGATIVE NEUROLOGICAL SYMPTOMS
no generalized seizures/no focal seizures/no paresthesias/no transient paralysis/no weakness/no syncope

## 2019-04-10 NOTE — H&P PST ADULT - NSANTHOSAYNRD_GEN_A_CORE
No. JOSUE screening performed.  STOP BANG Legend: 0-2 = LOW Risk; 3-4 = INTERMEDIATE Risk; 5-8 = HIGH Risk

## 2019-04-10 NOTE — H&P PST ADULT - NSICDXPASTSURGICALHX_GEN_ALL_CORE_FT
PAST SURGICAL HISTORY:  Cataract Excisiion right eye    H/O cardiac pacemaker 2014    History of cholecystectomy 2009

## 2019-04-10 NOTE — H&P PST ADULT - NSICDXPASTMEDICALHX_GEN_ALL_CORE_FT
PAST MEDICAL HISTORY:  Cardiac pacemaker 2014    Coronary artery disease s/p angiogram with stent 2011    DM (diabetes mellitus) x 16 years ; fs  4/09/19 ;    DM (diabetes mellitus)     Gout     H/O gastroesophageal reflux (GERD)     HLD (hyperlipidemia)     HTN (hypertension)     Hypertension, unspecified type     Kidney disease     Kidney disease

## 2019-04-10 NOTE — H&P PST ADULT - NSICDXPROBLEM_GEN_ALL_CORE_FT
PROBLEM DIAGNOSES  Problem: Disease of salivary gland  Assessment and Plan:     Problem: Diabetes mellitus  Assessment and Plan: PROBLEM DIAGNOSES  Problem: Disease of salivary gland  Assessment and Plan: Left Superficial Parotidectomy Possible Total parotidectomy with Possible Left Neck Dissection  Pre op instructions reviewed with pt ; wife and son ; all appear to have a fair understanding of pre op instructions  Pt to Dr hair for pre op evaluation    Problem: Diabetes mellitus  Assessment and Plan: BMP, HGBA1C done 4/10/19  As per guidelines recommendations are for pt to decrease lantus by    20 %to 28 units dos  Pt to hold Tradjenta dos  FS dos     Problem: CAD (coronary artery disease), native coronary artery  Assessment and Plan: Pt s/p angiogram with stent   Pt on asa 81 mg   Pt instructed to remain on asa pre op   EKG done Dr Hair    Problem: Tooth loose  Assessment and Plan: Pt with loose right lower tooth  Pt and family state unable to go to dentist pre op  Message to be left for surgeon and anesthesia       Problem: Pacemaker  Assessment and Plan: OR booking notified via fax PROBLEM DIAGNOSES  Problem: Disease of salivary gland  Assessment and Plan: Left Superficial Parotidectomy Possible Total parotidectomy with Possible Left Neck Dissection  Pre op instructions reviewed with pt ; wife and son ; all appear to have a fair understanding of pre op instructions  Pt to Dr Hair for pre op evaluation    Problem: Diabetes mellitus  Assessment and Plan: BMP, HGBA1C done 4/10/19  As per guidelines recommendations are for pt to decrease lantus by    20 %to 28 units dos  Pt to hold Tradjenta dos  Pt and son instructed to review pre op plan for lantus with pcp 4/11/19  FS dos     Problem: CAD (coronary artery disease), native coronary artery  Assessment and Plan: Pt s/p angiogram with stent   Pt on asa 81 mg   Pt instructed to remain on asa pre op   EKG done Dr Hair office ; to be faxed    Problem: Tooth loose  Assessment and Plan: Pt with loose right lower tooth  Pt and family state unable to go to dentist pre op  To review with anesthesia pre op       Problem: Pacemaker  Assessment and Plan: OR booking notified via fax PROBLEM DIAGNOSES  Problem: Disease of salivary gland  Assessment and Plan: Left Superficial Parotidectomy Possible Total parotidectomy with Possible Left Neck Dissection  Pre op instructions reviewed with pt ; wife and son ; all appear to have a fair understanding of pre op instructions  Pt to Dr hair for pre op evaluation    Problem: Diabetes mellitus  Assessment and Plan: BMP, HGBA1C done 4/10/19  As per guidelines recommendations are for pt to decrease lantus by    20 %to 28 units dos  Pt to hold Tradjenta dos  FS dos     Problem: CAD (coronary artery disease), native coronary artery  Assessment and Plan: Pt s/p angiogram with stent   Pt on asa 81 mg   Pt instructed to remain on asa pre op   EKG done Dr Hair  Call to Dr Holguin office ; aware asa plan    Problem: Tooth loose  Assessment and Plan: Pt with loose right lower tooth  Pt and family state unable to go to dentist pre op  Call to surgeons office ; s/w Katya to make surgeon aware  Message left for  anesthesia       Problem: Pacemaker  Assessment and Plan: OR booking notified via fax PROBLEM DIAGNOSES  Problem: Disease of salivary gland  Assessment and Plan: Left Superficial Parotidectomy Possible Total parotidectomy with Possible Left Neck Dissection  Pre op instructions reviewed with pt ; wife and son ; all appear to have a fair understanding of pre op instructions  Pt to Dr hair for pre op evaluation    Problem: Diabetes mellitus  Assessment and Plan: BMP, HGBA1C done 4/10/19  As per guidelines recommendations are for pt to decrease lantus by    20 %to 28 units dos  Pt to hold Tradjenta dos  FS dos     Problem: CAD (coronary artery disease), native coronary artery  Assessment and Plan: Pt s/p angiogram with stent   Pt on asa 81 mg   Pt instructed to remain on asa pre op   EKG done Dr Hair  Call to Dr Holguin office ;s/w Katya  aware asa plan  S/w Pt son ; reinforced ; pt to remain on asa 81 mg     Problem: Tooth loose  Assessment and Plan: Pt with loose right lower tooth  Pt and family state unable to go to dentist pre op  Call to surgeons office ; s/w Katya to make surgeon aware  Message left for  anesthesia       Problem: Pacemaker  Assessment and Plan: OR booking notified via fax PROBLEM DIAGNOSES  Problem: Disease of salivary gland  Assessment and Plan: Left Superficial Parotidectomy Possible Total parotidectomy with Possible Left Neck Dissection  Pre op instructions reviewed with pt ; wife and son ; all appear to have a fair understanding of pre op instructions  Pt to Dr hair for pre op evaluation    Problem: Diabetes mellitus  Assessment and Plan: BMP, HGBA1C done 4/10/19  As per guidelines recommendations are for pt to decrease lantus by    20 %to 28 units dos  Pt to hold Tradjenta dos  Pt to review plan for lantus with pcp 4/11/19  FS dos     Problem: CAD (coronary artery disease), native coronary artery  Assessment and Plan: Pt s/p angiogram with stent   Pt on asa 81 mg   Pt instructed to remain on asa pre op   EKG done Dr Hair  Call to Dr Holguin office ;s/w Katya  aware asa plan  S/w Pt son ; reinforced ; pt to remain on asa 81 mg     Problem: Tooth loose  Assessment and Plan: Pt with loose right lower tooth  Pt and family state unable to go to dentist pre op  Call to surgeons office ; s/w Katya to make surgeon aware  Message left for  anesthesia       Problem: Pacemaker  Assessment and Plan: OR booking notified via fax

## 2019-04-10 NOTE — H&P PST ADULT - LYMPHATIC
supraclavicular R/posterior cervical L/anterior cervical R/supraclavicular L/posterior cervical R/anterior cervical L

## 2019-04-10 NOTE — H&P PST ADULT - HEALTH CARE MAINTENANCE
Lens right eye s/o cataract    Pt states last interrogation 2/14/19 @ cardiologist ; the do not recall name of cardiologist

## 2019-04-11 PROBLEM — Z95.0 PRESENCE OF CARDIAC PACEMAKER: Chronic | Status: ACTIVE | Noted: 2019-04-10

## 2019-04-11 PROBLEM — E11.9 TYPE 2 DIABETES MELLITUS WITHOUT COMPLICATIONS: Chronic | Status: ACTIVE | Noted: 2019-02-11

## 2019-04-11 PROBLEM — I25.10 ATHEROSCLEROTIC HEART DISEASE OF NATIVE CORONARY ARTERY WITHOUT ANGINA PECTORIS: Chronic | Status: ACTIVE | Noted: 2019-04-10

## 2019-04-16 ENCOUNTER — TRANSCRIPTION ENCOUNTER (OUTPATIENT)
Age: 69
End: 2019-04-16

## 2019-04-17 ENCOUNTER — RESULT REVIEW (OUTPATIENT)
Age: 69
End: 2019-04-17

## 2019-04-17 ENCOUNTER — APPOINTMENT (OUTPATIENT)
Dept: OTOLARYNGOLOGY | Facility: HOSPITAL | Age: 69
End: 2019-04-17

## 2019-04-17 ENCOUNTER — INPATIENT (INPATIENT)
Facility: HOSPITAL | Age: 69
LOS: 2 days | Discharge: HOME | End: 2019-04-20
Attending: OTOLARYNGOLOGY | Admitting: OTOLARYNGOLOGY
Payer: MEDICAID

## 2019-04-17 VITALS
TEMPERATURE: 98 F | OXYGEN SATURATION: 99 % | RESPIRATION RATE: 16 BRPM | HEART RATE: 71 BPM | DIASTOLIC BLOOD PRESSURE: 66 MMHG | WEIGHT: 130.07 LBS | HEIGHT: 60 IN | SYSTOLIC BLOOD PRESSURE: 135 MMHG

## 2019-04-17 DIAGNOSIS — Z90.49 ACQUIRED ABSENCE OF OTHER SPECIFIED PARTS OF DIGESTIVE TRACT: Chronic | ICD-10-CM

## 2019-04-17 DIAGNOSIS — H26.9 UNSPECIFIED CATARACT: Chronic | ICD-10-CM

## 2019-04-17 DIAGNOSIS — Z95.0 PRESENCE OF CARDIAC PACEMAKER: Chronic | ICD-10-CM

## 2019-04-17 DIAGNOSIS — K11.9 DISEASE OF SALIVARY GLAND, UNSPECIFIED: ICD-10-CM

## 2019-04-17 PROBLEM — Z87.19 PERSONAL HISTORY OF OTHER DISEASES OF THE DIGESTIVE SYSTEM: Chronic | Status: ACTIVE | Noted: 2019-04-10

## 2019-04-17 LAB — RH IG SCN BLD-IMP: POSITIVE — SIGNIFICANT CHANGE UP

## 2019-04-17 PROCEDURE — 14301 TIS TRNFR ANY 30.1-60 SQ CM: CPT

## 2019-04-17 PROCEDURE — 88360 TUMOR IMMUNOHISTOCHEM/MANUAL: CPT | Mod: 26

## 2019-04-17 PROCEDURE — 88305 TISSUE EXAM BY PATHOLOGIST: CPT | Mod: 26

## 2019-04-17 PROCEDURE — 42415 EXCISE PAROTID GLAND/LESION: CPT

## 2019-04-17 PROCEDURE — 88334 PATH CONSLTJ SURG CYTO XM EA: CPT | Mod: 26,59

## 2019-04-17 PROCEDURE — 38724 REMOVAL OF LYMPH NODES NECK: CPT

## 2019-04-17 PROCEDURE — 88341 IMHCHEM/IMCYTCHM EA ADD ANTB: CPT | Mod: 26,59

## 2019-04-17 PROCEDURE — 88307 TISSUE EXAM BY PATHOLOGIST: CPT | Mod: 26

## 2019-04-17 PROCEDURE — 88342 IMHCHEM/IMCYTCHM 1ST ANTB: CPT | Mod: 26,59

## 2019-04-17 PROCEDURE — 88331 PATH CONSLTJ SURG 1 BLK 1SPC: CPT | Mod: 26

## 2019-04-17 RX ORDER — PANTOPRAZOLE SODIUM 20 MG/1
40 TABLET, DELAYED RELEASE ORAL DAILY
Qty: 0 | Refills: 0 | Status: DISCONTINUED | OUTPATIENT
Start: 2019-04-17 | End: 2019-04-20

## 2019-04-17 RX ORDER — DEXAMETHASONE 0.5 MG/5ML
8 ELIXIR ORAL ONCE
Qty: 0 | Refills: 0 | Status: DISCONTINUED | OUTPATIENT
Start: 2019-04-17 | End: 2019-04-17

## 2019-04-17 RX ORDER — DEXTROSE 50 % IN WATER 50 %
25 SYRINGE (ML) INTRAVENOUS ONCE
Qty: 0 | Refills: 0 | Status: DISCONTINUED | OUTPATIENT
Start: 2019-04-17 | End: 2019-04-20

## 2019-04-17 RX ORDER — DEXTROSE 50 % IN WATER 50 %
12.5 SYRINGE (ML) INTRAVENOUS ONCE
Qty: 0 | Refills: 0 | Status: DISCONTINUED | OUTPATIENT
Start: 2019-04-17 | End: 2019-04-20

## 2019-04-17 RX ORDER — SODIUM CHLORIDE 9 MG/ML
1000 INJECTION, SOLUTION INTRAVENOUS
Qty: 0 | Refills: 0 | Status: DISCONTINUED | OUTPATIENT
Start: 2019-04-17 | End: 2019-04-18

## 2019-04-17 RX ORDER — BACITRACIN ZINC 500 UNIT/G
1 OINTMENT IN PACKET (EA) TOPICAL
Qty: 0 | Refills: 0 | Status: DISCONTINUED | OUTPATIENT
Start: 2019-04-17 | End: 2019-04-20

## 2019-04-17 RX ORDER — AMLODIPINE BESYLATE 2.5 MG/1
10 TABLET ORAL DAILY
Qty: 0 | Refills: 0 | Status: DISCONTINUED | OUTPATIENT
Start: 2019-04-17 | End: 2019-04-20

## 2019-04-17 RX ORDER — DEXTROSE 50 % IN WATER 50 %
15 SYRINGE (ML) INTRAVENOUS ONCE
Qty: 0 | Refills: 0 | Status: DISCONTINUED | OUTPATIENT
Start: 2019-04-17 | End: 2019-04-20

## 2019-04-17 RX ORDER — INSULIN LISPRO 100/ML
VIAL (ML) SUBCUTANEOUS AT BEDTIME
Qty: 0 | Refills: 0 | Status: DISCONTINUED | OUTPATIENT
Start: 2019-04-17 | End: 2019-04-20

## 2019-04-17 RX ORDER — HYDROMORPHONE HYDROCHLORIDE 2 MG/ML
0.25 INJECTION INTRAMUSCULAR; INTRAVENOUS; SUBCUTANEOUS
Qty: 0 | Refills: 0 | Status: DISCONTINUED | OUTPATIENT
Start: 2019-04-17 | End: 2019-04-17

## 2019-04-17 RX ORDER — INSULIN LISPRO 100/ML
VIAL (ML) SUBCUTANEOUS
Qty: 0 | Refills: 0 | Status: DISCONTINUED | OUTPATIENT
Start: 2019-04-17 | End: 2019-04-20

## 2019-04-17 RX ORDER — ONDANSETRON 8 MG/1
4 TABLET, FILM COATED ORAL ONCE
Qty: 0 | Refills: 0 | Status: DISCONTINUED | OUTPATIENT
Start: 2019-04-17 | End: 2019-04-17

## 2019-04-17 RX ORDER — GLUCAGON INJECTION, SOLUTION 0.5 MG/.1ML
1 INJECTION, SOLUTION SUBCUTANEOUS ONCE
Qty: 0 | Refills: 0 | Status: DISCONTINUED | OUTPATIENT
Start: 2019-04-17 | End: 2019-04-20

## 2019-04-17 RX ORDER — LOSARTAN POTASSIUM 100 MG/1
50 TABLET, FILM COATED ORAL DAILY
Qty: 0 | Refills: 0 | Status: DISCONTINUED | OUTPATIENT
Start: 2019-04-17 | End: 2019-04-20

## 2019-04-17 RX ORDER — INSULIN GLARGINE 100 [IU]/ML
35 INJECTION, SOLUTION SUBCUTANEOUS EVERY MORNING
Qty: 0 | Refills: 0 | Status: DISCONTINUED | OUTPATIENT
Start: 2019-04-17 | End: 2019-04-20

## 2019-04-17 RX ORDER — OXYCODONE AND ACETAMINOPHEN 5; 325 MG/1; MG/1
1 TABLET ORAL EVERY 6 HOURS
Qty: 0 | Refills: 0 | Status: DISCONTINUED | OUTPATIENT
Start: 2019-04-17 | End: 2019-04-20

## 2019-04-17 RX ORDER — OXYCODONE AND ACETAMINOPHEN 5; 325 MG/1; MG/1
2 TABLET ORAL EVERY 6 HOURS
Qty: 0 | Refills: 0 | Status: DISCONTINUED | OUTPATIENT
Start: 2019-04-17 | End: 2019-04-20

## 2019-04-17 RX ADMIN — HYDROMORPHONE HYDROCHLORIDE 0.25 MILLIGRAM(S): 2 INJECTION INTRAMUSCULAR; INTRAVENOUS; SUBCUTANEOUS at 14:05

## 2019-04-17 RX ADMIN — HYDROMORPHONE HYDROCHLORIDE 0.25 MILLIGRAM(S): 2 INJECTION INTRAMUSCULAR; INTRAVENOUS; SUBCUTANEOUS at 13:55

## 2019-04-17 RX ADMIN — HYDROMORPHONE HYDROCHLORIDE 0.25 MILLIGRAM(S): 2 INJECTION INTRAMUSCULAR; INTRAVENOUS; SUBCUTANEOUS at 14:15

## 2019-04-17 RX ADMIN — Medication 1 APPLICATION(S): at 18:30

## 2019-04-17 RX ADMIN — HYDROMORPHONE HYDROCHLORIDE 0.25 MILLIGRAM(S): 2 INJECTION INTRAMUSCULAR; INTRAVENOUS; SUBCUTANEOUS at 14:01

## 2019-04-17 RX ADMIN — Medication 4: at 17:26

## 2019-04-17 RX ADMIN — OXYCODONE AND ACETAMINOPHEN 1 TABLET(S): 5; 325 TABLET ORAL at 22:53

## 2019-04-17 RX ADMIN — Medication 3: at 22:28

## 2019-04-17 RX ADMIN — HYDROMORPHONE HYDROCHLORIDE 0.25 MILLIGRAM(S): 2 INJECTION INTRAMUSCULAR; INTRAVENOUS; SUBCUTANEOUS at 14:30

## 2019-04-17 RX ADMIN — SODIUM CHLORIDE 75 MILLILITER(S): 9 INJECTION, SOLUTION INTRAVENOUS at 13:33

## 2019-04-17 RX ADMIN — OXYCODONE AND ACETAMINOPHEN 1 TABLET(S): 5; 325 TABLET ORAL at 23:23

## 2019-04-17 RX ADMIN — SODIUM CHLORIDE 75 MILLILITER(S): 9 INJECTION, SOLUTION INTRAVENOUS at 18:29

## 2019-04-17 RX ADMIN — AMLODIPINE BESYLATE 10 MILLIGRAM(S): 2.5 TABLET ORAL at 18:30

## 2019-04-17 RX ADMIN — PANTOPRAZOLE SODIUM 40 MILLIGRAM(S): 20 TABLET, DELAYED RELEASE ORAL at 22:53

## 2019-04-17 RX ADMIN — HYDROMORPHONE HYDROCHLORIDE 0.25 MILLIGRAM(S): 2 INJECTION INTRAMUSCULAR; INTRAVENOUS; SUBCUTANEOUS at 15:49

## 2019-04-17 NOTE — ASU PATIENT PROFILE, ADULT - PMH
Cardiac pacemaker  2014  Coronary artery disease  s/p angiogram with stent 2011  DM (diabetes mellitus)    DM (diabetes mellitus)  x 16 years ; fs  4/09/19 ;  Gout    H/O gastroesophageal reflux (GERD)    HLD (hyperlipidemia)    HTN (hypertension)    Hypertension, unspecified type    Kidney disease    Kidney disease

## 2019-04-18 DIAGNOSIS — R14.0 ABDOMINAL DISTENSION (GASEOUS): ICD-10-CM

## 2019-04-18 DIAGNOSIS — I10 ESSENTIAL (PRIMARY) HYPERTENSION: ICD-10-CM

## 2019-04-18 DIAGNOSIS — I25.10 ATHEROSCLEROTIC HEART DISEASE OF NATIVE CORONARY ARTERY WITHOUT ANGINA PECTORIS: ICD-10-CM

## 2019-04-18 DIAGNOSIS — E11.9 TYPE 2 DIABETES MELLITUS WITHOUT COMPLICATIONS: ICD-10-CM

## 2019-04-18 PROCEDURE — 99223 1ST HOSP IP/OBS HIGH 75: CPT

## 2019-04-18 RX ORDER — SENNA PLUS 8.6 MG/1
2 TABLET ORAL AT BEDTIME
Qty: 0 | Refills: 0 | Status: DISCONTINUED | OUTPATIENT
Start: 2019-04-18 | End: 2019-04-19

## 2019-04-18 RX ORDER — SIMETHICONE 80 MG/1
80 TABLET, CHEWABLE ORAL
Qty: 0 | Refills: 0 | Status: DISCONTINUED | OUTPATIENT
Start: 2019-04-18 | End: 2019-04-20

## 2019-04-18 RX ORDER — POLYETHYLENE GLYCOL 3350 17 G/17G
17 POWDER, FOR SOLUTION ORAL ONCE
Qty: 0 | Refills: 0 | Status: COMPLETED | OUTPATIENT
Start: 2019-04-18 | End: 2019-04-18

## 2019-04-18 RX ORDER — INSULIN LISPRO 100/ML
6 VIAL (ML) SUBCUTANEOUS
Qty: 0 | Refills: 0 | Status: DISCONTINUED | OUTPATIENT
Start: 2019-04-18 | End: 2019-04-19

## 2019-04-18 RX ADMIN — PANTOPRAZOLE SODIUM 40 MILLIGRAM(S): 20 TABLET, DELAYED RELEASE ORAL at 13:07

## 2019-04-18 RX ADMIN — OXYCODONE AND ACETAMINOPHEN 1 TABLET(S): 5; 325 TABLET ORAL at 09:49

## 2019-04-18 RX ADMIN — OXYCODONE AND ACETAMINOPHEN 1 TABLET(S): 5; 325 TABLET ORAL at 10:30

## 2019-04-18 RX ADMIN — SIMETHICONE 80 MILLIGRAM(S): 80 TABLET, CHEWABLE ORAL at 18:13

## 2019-04-18 RX ADMIN — Medication 6 UNIT(S): at 18:15

## 2019-04-18 RX ADMIN — Medication 4: at 08:55

## 2019-04-18 RX ADMIN — AMLODIPINE BESYLATE 10 MILLIGRAM(S): 2.5 TABLET ORAL at 05:24

## 2019-04-18 RX ADMIN — Medication 3: at 13:06

## 2019-04-18 RX ADMIN — Medication 1 APPLICATION(S): at 05:25

## 2019-04-18 RX ADMIN — LOSARTAN POTASSIUM 50 MILLIGRAM(S): 100 TABLET, FILM COATED ORAL at 05:24

## 2019-04-18 RX ADMIN — Medication 1 APPLICATION(S): at 18:16

## 2019-04-18 RX ADMIN — POLYETHYLENE GLYCOL 3350 17 GRAM(S): 17 POWDER, FOR SOLUTION ORAL at 13:13

## 2019-04-18 RX ADMIN — Medication 2: at 18:15

## 2019-04-18 RX ADMIN — INSULIN GLARGINE 35 UNIT(S): 100 INJECTION, SOLUTION SUBCUTANEOUS at 08:51

## 2019-04-18 NOTE — CONSULT NOTE ADULT - PROBLEM SELECTOR RECOMMENDATION 2
- suspect due to constipation  - no N/V  - on opiates for pain control  - added bowel regimen - Senna daily, miralax x 1  - simethicone BID  - monitor

## 2019-04-18 NOTE — CONSULT NOTE ADULT - PROBLEM SELECTOR RECOMMENDATION 9
- hyperglycemic  - continue Lantus 35 U daily  - add Humalog 6 U TID with meals  - continue correction scales  - monitor fingersticks

## 2019-04-18 NOTE — PROGRESS NOTE ADULT - SUBJECTIVE AND OBJECTIVE BOX
Patient seen and examined. No acute events overnight.    Vital Signs Last 24 Hrs  T(C): 36.8 (18 Apr 2019 05:23), Max: 37 (18 Apr 2019 02:13)  T(F): 98.2 (18 Apr 2019 05:23), Max: 98.6 (18 Apr 2019 02:13)  HR: 95 (18 Apr 2019 05:23) (83 - 95)  BP: 123/56 (18 Apr 2019 05:23) (99/51 - 129/53)  BP(mean): 62 (17 Apr 2019 17:00) (61 - 103)  RR: 17 (18 Apr 2019 05:23) (14 - 20)  SpO2: 95% (18 Apr 2019 05:23) (93% - 100%)  NAD, awake  Breathing comfortably on room air, no stridor, no stertor  Nose: nares patent anteriorly  OC/OP: clear secretions  CN7 with left lower division palsy, complete eye closure  Neck soft, flat, incision c/d/i  FLORESITA with SS output    A/P 69M s/p L parotid, SND 2-4, cervicofacial flap 4/17  - monitor FLORESITA output  - pain control  - diet  - home meds  - dispo: home possibly tomorrow pending FLORESITA output

## 2019-04-18 NOTE — CONSULT NOTE ADULT - SUBJECTIVE AND OBJECTIVE BOX
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 Patient seen with son at the bedside. Per patient, he complains of distended abdomen, with gas. Denies abdominal pain. Denies N/V. Denies GERD. Denies SOB. Per patient/son, denies h/o heart failure. At home, patient uses Lantus 35 U daily in the morning, also linagliptin 5 mg daily. No other complaints at time of visit.    History limited due to: [] Dementia  [] Delirium  [] Condition	    Function: [] Independent  [] Assistance  [] Total care  [] Non-ambulatory    Allergies    No Known Allergies    Intolerances        HOME MEDICATIONS: [] Reviewed    MEDICATIONS  (STANDING):  amLODIPine   Tablet 10 milliGRAM(s) Oral daily  BACItracin   Ointment 1 Application(s) Topical two times a day  dextrose 5%. 1000 milliLiter(s) (50 mL/Hr) IV Continuous <Continuous>  dextrose 50% Injectable 12.5 Gram(s) IV Push once  dextrose 50% Injectable 25 Gram(s) IV Push once  dextrose 50% Injectable 25 Gram(s) IV Push once  insulin glargine Injectable (LANTUS) 35 Unit(s) SubCutaneous every morning  insulin lispro (HumaLOG) corrective regimen sliding scale   SubCutaneous three times a day before meals  insulin lispro (HumaLOG) corrective regimen sliding scale   SubCutaneous at bedtime  lactated ringers. 1000 milliLiter(s) (75 mL/Hr) IV Continuous <Continuous>  losartan 50 milliGRAM(s) Oral daily  pantoprazole    Tablet 40 milliGRAM(s) Oral daily  senna 2 Tablet(s) Oral at bedtime  simethicone 80 milliGRAM(s) Chew two times a day    MEDICATIONS  (PRN):  dextrose 40% Gel 15 Gram(s) Oral once PRN Blood Glucose LESS THAN 70 milliGRAM(s)/deciliter  glucagon  Injectable 1 milliGRAM(s) IntraMuscular once PRN Glucose LESS THAN 70 milligrams/deciliter  oxyCODONE    5 mG/acetaminophen 325 mG 1 Tablet(s) Oral every 6 hours PRN Moderate Pain (4 - 6)  oxyCODONE    5 mG/acetaminophen 325 mG 2 Tablet(s) Oral every 6 hours PRN Severe Pain (7 - 10)      PAST MEDICAL & SURGICAL HISTORY:  Cardiac pacemaker:   Coronary artery disease: s/p angiogram with stent   H/O gastroesophageal reflux (GERD)  Kidney disease  DM (diabetes mellitus)  Hypertension, unspecified type  Gout  DM (diabetes mellitus): x 16 years ; fs  19 ;  Kidney disease  HLD (hyperlipidemia)  HTN (hypertension)  Cataract: Excisiion right eye  H/O cardiac pacemaker:   History of cholecystectomy:   [ ] Reviewed     SOCIAL HISTORY:  Residence: [] [facility]  [] SNF  [] Community  [] Substance abuse:   [] Tobacco:   [ ] Alcohol use:     FAMILY HISTORY:  Family history of oral cancer: brother   [] No pertinent family history in first degree relatives     REVIEW OF SYSTEMS:    CONSTITUTIONAL: No fever, no weight loss  ENMT:  No difficulty hearing, tinnitus, vertigo  NECK: + pain  RESPIRATORY: No cough. No dyspnea. No wheezing.  CARDIOVASCULAR: No CP. No orthopnea.  GASTROINTESTINAL: +abdominal distention, gas  GENITOURINARY: No dysuria  SKIN: No itching. No rash  ENDOCRINE: No heat or cold intolerance  MUSCULOSKELETAL: No muscle pain. No back pain  HEME/LYMPH: No easy bruising, or bleeding gums  ALLERGY AND IMMUNOLOGIC: No hives. No eczema    [] All other ROS negative  [] Unable to obtain due to poor mental status    Vital Signs Last 24 Hrs  T(C): 37.2 (2019 13:08), Max: 37.4 (2019 10:38)  T(F): 98.9 (2019 13:08), Max: 99.4 (2019 10:38)  HR: 85 (2019 13:08) (82 - 95)  BP: 104/53 (2019 13:08) (104/53 - 125/59)  BP(mean): 62 (2019 17:00) (61 - 62)  RR: 18 (2019 13:08) (16 - 18)  SpO2: 95% (2019 13:08) (94% - 97%)    PHYSICAL EXAM:  GENERAL: NAD, well-developed  HEAD:  Atraumatic, Normocephalic  EYES: EOMI  NECK: Supple, No JVD  CHEST/LUNG: nonlabored breathing  HEART: nl S1/S2  ABDOMEN: nondistended, soft  EXTREMITIES:  no LE edema  PSYCH: alert, answering questions appropriately  NEUROLOGY: non-focal  SKIN: No rashes noted    LABS:              CAPILLARY BLOOD GLUCOSE      POCT Blood Glucose.: 279 mg/dL (2019 12:48)      RADIOLOGY & ADDITIONAL STUDIES:    EKG:   Personally Reviewed:  [] YES     Imaging:   Personally Reviewed:  [x] YES     < from: CT Maxillofacial No Cont (19 @ 15:32) >  IMPRESSION:  Transpatial mass centered along the left superficial parotid lobe with   findings suspicious for perineural spread of disease and dermal   involvement of disease. An infectious process may be considered in the   appropriate clinical setting. Contrast-enhanced MR imaging is recommended   for further evaluation of local regional disease extent. Dedicated neck   imaging may be done to evaluate for neck lymphadenopathy.    < end of copied text >                Consultant(s) notes reviewed:      Care Discussed with Consultant(s)/Other Providers: ENT regarding hyperglycemia    Advanced Directives: [] DNR  [] No feeding tube  [] MOLST in chart  [] MOLST completed today  [] Unknown        Skylar Jacome M.D.  Hospitalist  Pager [phone]

## 2019-04-18 NOTE — CONSULT NOTE ADULT - ASSESSMENT
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

## 2019-04-19 ENCOUNTER — TRANSCRIPTION ENCOUNTER (OUTPATIENT)
Age: 69
End: 2019-04-19

## 2019-04-19 PROCEDURE — 99233 SBSQ HOSP IP/OBS HIGH 50: CPT

## 2019-04-19 RX ORDER — SENNA PLUS 8.6 MG/1
2 TABLET ORAL
Qty: 0 | Refills: 0 | Status: DISCONTINUED | OUTPATIENT
Start: 2019-04-19 | End: 2019-04-20

## 2019-04-19 RX ORDER — INSULIN LISPRO 100/ML
8 VIAL (ML) SUBCUTANEOUS
Qty: 0 | Refills: 0 | Status: DISCONTINUED | OUTPATIENT
Start: 2019-04-19 | End: 2019-04-20

## 2019-04-19 RX ORDER — POLYETHYLENE GLYCOL 3350 17 G/17G
17 POWDER, FOR SOLUTION ORAL DAILY
Qty: 0 | Refills: 0 | Status: DISCONTINUED | OUTPATIENT
Start: 2019-04-19 | End: 2019-04-20

## 2019-04-19 RX ADMIN — SIMETHICONE 80 MILLIGRAM(S): 80 TABLET, CHEWABLE ORAL at 05:12

## 2019-04-19 RX ADMIN — INSULIN GLARGINE 35 UNIT(S): 100 INJECTION, SOLUTION SUBCUTANEOUS at 09:13

## 2019-04-19 RX ADMIN — Medication 2: at 09:12

## 2019-04-19 RX ADMIN — Medication 6 UNIT(S): at 09:12

## 2019-04-19 RX ADMIN — LOSARTAN POTASSIUM 50 MILLIGRAM(S): 100 TABLET, FILM COATED ORAL at 05:13

## 2019-04-19 RX ADMIN — PANTOPRAZOLE SODIUM 40 MILLIGRAM(S): 20 TABLET, DELAYED RELEASE ORAL at 13:15

## 2019-04-19 RX ADMIN — AMLODIPINE BESYLATE 10 MILLIGRAM(S): 2.5 TABLET ORAL at 05:12

## 2019-04-19 RX ADMIN — Medication 1 APPLICATION(S): at 19:00

## 2019-04-19 RX ADMIN — Medication 8 UNIT(S): at 13:15

## 2019-04-19 RX ADMIN — Medication 1 APPLICATION(S): at 05:12

## 2019-04-19 NOTE — DISCHARGE NOTE PROVIDER - NSDCFUADDINST_GEN_ALL_CORE_FT
empty FLORESITA drain As taught at [facility] and record the amount. Call Dr. Steiner office to schedule an appointment to have it removed next week. Follow up with Dr. Matamoros next week. Call [phone] to schedule an appointment. Okay to shower. Follow up with Dr. Holguin on 4/22/19 for removal of drain. Please empty drain and record drain output every 12 hours.    Call [phone] on Monday at 9 am to schedule an appointment for drain removal.     Okay to shower.  Take percocet as needed for severe pain  Take colace and senna while taking percocet to prevent constipation  Resume home meds

## 2019-04-19 NOTE — DISCHARGE NOTE PROVIDER - CARE PROVIDER_API CALL
Herb Melgar)  Otolaryngology  [address] [place] [address]  Phone: [phone]  Fax: [phone]  Follow Up Time:

## 2019-04-19 NOTE — PROGRESS NOTE ADULT - SUBJECTIVE AND OBJECTIVE BOX
Patient seen and examined. No acute events overnight.    Vital Signs Last 24 Hrs  T(C): 37 (04-19-19 @ 05:11), Max: 37.4 (04-18-19 @ 10:38)  HR: 84 (04-19-19 @ 05:11) (81 - 85)  BP: 113/65 (04-19-19 @ 05:11) (100/53 - 114/58)  RR: 18 (04-19-19 @ 05:11) (18 - 18)  SpO2: 92% (04-19-19 @ 05:11) (92% - 96%)  NAD, awake  Breathing comfortably on room air, no stridor, no stertor  Nose: nares patent anteriorly  OC/OP: clear secretions  CN7 with left lower division palsy, complete eye closure  Neck soft, flat, incision c/d/i  FLORESITA with SS output    A/P 69M s/p L parotid, SND 2-4, cervicofacial flap 4/17  - monitor FLORESITA output  - pain control  - diet  - home meds  - dispo: home possibly pending FLORESITA output

## 2019-04-19 NOTE — PROGRESS NOTE ADULT - SUBJECTIVE AND OBJECTIVE BOX
CHIEF COMPLAINT: Patient is a 68y old  male who presents with a chief complaint of     SUBJECTIVE / OVERNIGHT EVENTS:    Abdominal distention improved. Denies abdominal pain. Denies SOB. Denies CP. No BM for several days.    MEDICATIONS  (STANDING):  amLODIPine   Tablet 10 milliGRAM(s) Oral daily  BACItracin   Ointment 1 Application(s) Topical two times a day  dextrose 50% Injectable 12.5 Gram(s) IV Push once  dextrose 50% Injectable 25 Gram(s) IV Push once  dextrose 50% Injectable 25 Gram(s) IV Push once  insulin glargine Injectable (LANTUS) 35 Unit(s) SubCutaneous every morning  insulin lispro (HumaLOG) corrective regimen sliding scale   SubCutaneous three times a day before meals  insulin lispro (HumaLOG) corrective regimen sliding scale   SubCutaneous at bedtime  insulin lispro Injectable (HumaLOG) 8 Unit(s) SubCutaneous three times a day with meals  losartan 50 milliGRAM(s) Oral daily  pantoprazole    Tablet 40 milliGRAM(s) Oral daily  senna 2 Tablet(s) Oral at bedtime  simethicone 80 milliGRAM(s) Chew two times a day    MEDICATIONS  (PRN):  dextrose 40% Gel 15 Gram(s) Oral once PRN Blood Glucose LESS THAN 70 milliGRAM(s)/deciliter  glucagon  Injectable 1 milliGRAM(s) IntraMuscular once PRN Glucose LESS THAN 70 milligrams/deciliter  oxyCODONE    5 mG/acetaminophen 325 mG 1 Tablet(s) Oral every 6 hours PRN Moderate Pain (4 - 6)  oxyCODONE    5 mG/acetaminophen 325 mG 2 Tablet(s) Oral every 6 hours PRN Severe Pain (7 - 10)      VITALS:  T(F): 97.8 (04-19-19 @ 10:00), Max: 98.7 (04-18-19 @ 18:06)  HR: 82 (04-19-19 @ 10:00) (81 - 84)  BP: 130/69 (04-19-19 @ 10:00) (100/53 - 130/69)  RR: 18 (04-19-19 @ 10:00) (18 - 18)  SpO2: 94% (04-19-19 @ 10:00)      CAPILLARY BLOOD GLUCOSE    Output     I&O's Summary  T(F): 97.8 (04-19-19 @ 10:00), Max: 98.7 (04-18-19 @ 18:06)  HR: 82 (04-19-19 @ 10:00) (81 - 84)  BP: 130/69 (04-19-19 @ 10:00) (100/53 - 130/69)  RR: 18 (04-19-19 @ 10:00) (18 - 18)  SpO2: 94% (04-19-19 @ 10:00)    PHYSICAL EXAM:  GENERAL: NAD, well-developed  HEAD:  Atraumatic, Normocephalic  EYES: EOMI  NECK: Supple, No JVD  CHEST/LUNG: nonlabored breathing  HEART: nl S1/S2  ABDOMEN: nondistended, soft, nontender to palpation  EXTREMITIES:  no LE edema  PSYCH: alert, answering questions appropriately  NEUROLOGY: non-focal  SKIN: No rashes noted    LABS:                        MICROBIOLOGY:        RADIOLOGY & ADDITIONAL TESTS:    Imaging Personally Reviewed:    < from: CT Maxillofacial No Cont (02.27.19 @ 15:32) >      < end of copied text >        [x] Care Discussed with Consultants/Other Providers:      Skylar Jacome M.D.  Hospitalist  Pager [phone]

## 2019-04-19 NOTE — PROGRESS NOTE ADULT - ASSESSMENT
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    Type 2 diabetes mellitus. Recommendation: hyperglycemia slightly improved today  - continue Lantus 35 U daily  - increased Humalog to 8 U TID with meals  - continue correction scales  - monitor fingersticks.    Problem/Recommendation - 2:  ·  Problem: Abdominal distention.  Recommendation: - suspect due to constipation  - no N/V  - on opiates for pain control  - still no BM  - intensity bowel regimen  - Senna BID, colace TID, miralax daily  - simethicone BID  - monitor.     Problem/Recommendation - 3:  ·  Problem: HTN (hypertension).  Recommendation: BP acceptable  - continue current antihypertensive regimen.     Problem/Recommendation - 4:  ·  Problem: Coronary artery disease.  Recommendation: - cont aspirin, statin. 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    Type 2 diabetes mellitus. Recommendation: hyperglycemia slightly improved today  - continue Lantus 35 U daily  - increased Humalog to 8 U TID with meals  - continue correction scales  - monitor fingersticks  - on DC, resume home regimen    Abdominal distention.  Recommendation: - suspect due to constipation  - no N/V  - on opiates for pain control  - still no BM  - intensity bowel regimen  - Senna BID, colace TID, miralax daily  - simethicone BID  - monitor.     HTN (hypertension).  Recommendation: BP acceptable  - continue current antihypertensive regimen.     Coronary artery disease.  Recommendation: - cont aspirin, statin.

## 2019-04-19 NOTE — DISCHARGE NOTE PROVIDER - HOSPITAL COURSE
S/P parotidectomy and neck dissection. FLORESITA drain was monitored for output. Patient discharged home. S/P parotidectomy and neck dissection. FLORESITA drain was monitored for output and removed prior to discharge. Patient discharged home. S/P parotidectomy and neck dissection. FLORESITA drain was monitored for output, however, FLORESITA output was still noted to be high and patient was discharged home with FLORESITA. He will follow up with [facility] on Monday for FLORESITA removal.

## 2019-04-20 ENCOUNTER — TRANSCRIPTION ENCOUNTER (OUTPATIENT)
Age: 69
End: 2019-04-20

## 2019-04-20 VITALS
OXYGEN SATURATION: 95 % | HEART RATE: 73 BPM | DIASTOLIC BLOOD PRESSURE: 62 MMHG | RESPIRATION RATE: 18 BRPM | TEMPERATURE: 98 F | SYSTOLIC BLOOD PRESSURE: 113 MMHG

## 2019-04-20 PROCEDURE — 99233 SBSQ HOSP IP/OBS HIGH 50: CPT

## 2019-04-20 RX ORDER — BACITRACIN ZINC 500 UNIT/G
1 OINTMENT IN PACKET (EA) TOPICAL
Qty: 1 | Refills: 0
Start: 2019-04-20 | End: 2019-04-26

## 2019-04-20 RX ORDER — LACTULOSE 10 G/15ML
10 SOLUTION ORAL DAILY
Qty: 0 | Refills: 0 | Status: DISCONTINUED | OUTPATIENT
Start: 2019-04-20 | End: 2019-04-20

## 2019-04-20 RX ORDER — DOCUSATE SODIUM 100 MG
1 CAPSULE ORAL
Qty: 14 | Refills: 0
Start: 2019-04-20 | End: 2019-04-26

## 2019-04-20 RX ORDER — SENNA PLUS 8.6 MG/1
2 TABLET ORAL
Qty: 56 | Refills: 0
Start: 2019-04-20 | End: 2019-05-03

## 2019-04-20 RX ORDER — INSULIN LISPRO 100/ML
5 VIAL (ML) SUBCUTANEOUS
Qty: 0 | Refills: 0 | Status: DISCONTINUED | OUTPATIENT
Start: 2019-04-20 | End: 2019-04-20

## 2019-04-20 RX ADMIN — AMLODIPINE BESYLATE 10 MILLIGRAM(S): 2.5 TABLET ORAL at 06:05

## 2019-04-20 RX ADMIN — Medication 1: at 18:10

## 2019-04-20 RX ADMIN — OXYCODONE AND ACETAMINOPHEN 1 TABLET(S): 5; 325 TABLET ORAL at 09:26

## 2019-04-20 RX ADMIN — INSULIN GLARGINE 35 UNIT(S): 100 INJECTION, SOLUTION SUBCUTANEOUS at 09:26

## 2019-04-20 RX ADMIN — PANTOPRAZOLE SODIUM 40 MILLIGRAM(S): 20 TABLET, DELAYED RELEASE ORAL at 12:03

## 2019-04-20 RX ADMIN — Medication 1 APPLICATION(S): at 06:08

## 2019-04-20 RX ADMIN — LOSARTAN POTASSIUM 50 MILLIGRAM(S): 100 TABLET, FILM COATED ORAL at 06:05

## 2019-04-20 RX ADMIN — Medication 5 UNIT(S): at 13:26

## 2019-04-20 RX ADMIN — Medication 1 APPLICATION(S): at 18:10

## 2019-04-20 RX ADMIN — Medication 5 UNIT(S): at 18:11

## 2019-04-20 RX ADMIN — SIMETHICONE 80 MILLIGRAM(S): 80 TABLET, CHEWABLE ORAL at 18:10

## 2019-04-20 RX ADMIN — Medication 1: at 13:26

## 2019-04-20 RX ADMIN — OXYCODONE AND ACETAMINOPHEN 1 TABLET(S): 5; 325 TABLET ORAL at 10:15

## 2019-04-20 NOTE — PROGRESS NOTE ADULT - ASSESSMENT
a/p: 69M s/p left parotidectomy, SND, cervicofacial flap on 4/17  -pain control PRN  -diet as tolerated  -home medications  -oob ad fiona  -will d/w attending re: drain removal today

## 2019-04-20 NOTE — DISCHARGE NOTE NURSING/CASE MANAGEMENT/SOCIAL WORK - NSDCPNINST_GEN_ALL_CORE
Call MD for f/u appointment, call for fever, severe pain, empty FLORESITA 3x/day and keep log on the output

## 2019-04-20 NOTE — DISCHARGE NOTE NURSING/CASE MANAGEMENT/SOCIAL WORK - NSDCDPATPORTLINK_GEN_ALL_CORE
You can access the [facility] Patient Portal, offered by [facility], by registering with the following website: [email or web address]

## 2019-04-20 NOTE — PROGRESS NOTE ADULT - SUBJECTIVE AND OBJECTIVE BOX
Patient seen and examined at bedside this am  no events overnight    exam  nad, awake and alert  breathing comfortably on room air  no stridor/stertor  incision c/d/I  neck soft/flat  susana with serosanguinous output

## 2019-04-20 NOTE — PROGRESS NOTE ADULT - SUBJECTIVE AND OBJECTIVE BOX
Damaris Infante MD  Pager [phone]    CHIEF COMPLAINT: Patient is a 68y old  male who presents with a chief complaint of postoperative monitoring (19 Apr 2019 07:42)      SUBJECTIVE / OVERNIGHT EVENTS:  pt has no specific complaints, denies chest pain/sob/dizziness, episodes of hypoglycemia yesterday noted    MEDICATIONS  (STANDING):  amLODIPine   Tablet 10 milliGRAM(s) Oral daily  BACItracin   Ointment 1 Application(s) Topical two times a day  dextrose 50% Injectable 12.5 Gram(s) IV Push once  dextrose 50% Injectable 25 Gram(s) IV Push once  dextrose 50% Injectable 25 Gram(s) IV Push once  insulin glargine Injectable (LANTUS) 35 Unit(s) SubCutaneous every morning  insulin lispro (HumaLOG) corrective regimen sliding scale   SubCutaneous three times a day before meals  insulin lispro (HumaLOG) corrective regimen sliding scale   SubCutaneous at bedtime  insulin lispro Injectable (HumaLOG) 5 Unit(s) SubCutaneous three times a day before meals  losartan 50 milliGRAM(s) Oral daily  pantoprazole    Tablet 40 milliGRAM(s) Oral daily  polyethylene glycol 3350 17 Gram(s) Oral daily  senna 2 Tablet(s) Oral two times a day  simethicone 80 milliGRAM(s) Chew two times a day    MEDICATIONS  (PRN):  dextrose 40% Gel 15 Gram(s) Oral once PRN Blood Glucose LESS THAN 70 milliGRAM(s)/deciliter  glucagon  Injectable 1 milliGRAM(s) IntraMuscular once PRN Glucose LESS THAN 70 milligrams/deciliter  oxyCODONE    5 mG/acetaminophen 325 mG 1 Tablet(s) Oral every 6 hours PRN Moderate Pain (4 - 6)  oxyCODONE    5 mG/acetaminophen 325 mG 2 Tablet(s) Oral every 6 hours PRN Severe Pain (7 - 10)      VITALS:  T(F): 98.7 (04-20-19 @ 10:20), Max: 98.7 (04-20-19 @ 01:55)  HR: 73 (04-20-19 @ 10:20) (73 - 83)  BP: 116/67 (04-20-19 @ 10:20) (109/68 - 130/72)  RR: 17 (04-20-19 @ 10:20) (17 - 19)  SpO2: 99% (04-20-19 @ 10:20)      CAPILLARY BLOOD GLUCOSE    Output     I&O's Summary  T(F): 98.7 (04-20-19 @ 10:20), Max: 98.7 (04-20-19 @ 01:55)  HR: 73 (04-20-19 @ 10:20) (73 - 83)  BP: 116/67 (04-20-19 @ 10:20) (109/68 - 130/72)  RR: 17 (04-20-19 @ 10:20) (17 - 19)  SpO2: 99% (04-20-19 @ 10:20)    PHYSICAL EXAM:  GENERAL: NAD, well-developed  HEAD:  Atraumatic, Normocephalic  EYES: EOMI  NECK: Supple, No JVD  CHEST/LUNG: nonlabored breathing  HEART: nl S1/S2  ABDOMEN: nondistended, soft, nontender to palpation  EXTREMITIES:  no LE edema  PSYCH: alert, answering questions appropriately  NEUROLOGY: non-focal  SKIN: No rashes noted    LABS:        MICROBIOLOGY:      RADIOLOGY & ADDITIONAL TESTS:    Imaging Personally Reviewed:    [ ] Consultant(s) Notes Reviewed:  [ ] Care Discussed with Consultants/Other Providers:

## 2019-04-20 NOTE — PROGRESS NOTE ADULT - ASSESSMENT
69 y/o man with HTN, DM2, CAD s/p stent, ppm, CKD3, L parotid tumor sent in for L parotidectomy, SND 2-4, cervicofacial flap s/p OR on 4/17, post-op c/b hyperglycemia.    Type 2 diabetes mellitus. Recommendation: hyperglycemia slightly improved today  - continue Lantus 35 U daily  - hypoglycemic yesterday, reduce humalog to 5 U tid with meals  - continue correction scales  - monitor fingersticks  - on DC, resume home regimen    Abdominal distention.  Recommendation: - suspect due to constipation  - no N/V  - on opiates for pain control  - still no BM  - intensity bowel regimen  - Senna BID, colace TID, miralax daily  - simethicone BID  -lactulose prn  - monitor.     HTN (hypertension).  Recommendation: BP acceptable  - continue current antihypertensive regimen.     Coronary artery disease.  Recommendation: - cont aspirin, statin.

## 2019-04-22 ENCOUNTER — INBOUND DOCUMENT (OUTPATIENT)
Age: 69
End: 2019-04-22

## 2019-04-25 ENCOUNTER — APPOINTMENT (OUTPATIENT)
Dept: OTOLARYNGOLOGY | Facility: CLINIC | Age: 69
End: 2019-04-25
Payer: MEDICAID

## 2019-04-25 PROCEDURE — 99024 POSTOP FOLLOW-UP VISIT: CPT

## 2019-04-30 ENCOUNTER — APPOINTMENT (OUTPATIENT)
Dept: OTOLARYNGOLOGY | Facility: CLINIC | Age: 69
End: 2019-04-30
Payer: MEDICAID

## 2019-04-30 VITALS
WEIGHT: 128 LBS | HEART RATE: 72 BPM | HEIGHT: 62 IN | SYSTOLIC BLOOD PRESSURE: 138 MMHG | TEMPERATURE: 98.3 F | BODY MASS INDEX: 23.55 KG/M2 | OXYGEN SATURATION: 98 % | DIASTOLIC BLOOD PRESSURE: 62 MMHG

## 2019-04-30 PROCEDURE — 99024 POSTOP FOLLOW-UP VISIT: CPT

## 2019-05-01 ENCOUNTER — OUTPATIENT (OUTPATIENT)
Dept: OUTPATIENT SERVICES | Facility: HOSPITAL | Age: 69
LOS: 1 days | Discharge: HOME | End: 2019-05-01
Payer: MEDICARE

## 2019-05-01 ENCOUNTER — APPOINTMENT (OUTPATIENT)
Dept: RADIATION ONCOLOGY | Facility: CLINIC | Age: 69
End: 2019-05-01
Payer: MEDICARE

## 2019-05-01 ENCOUNTER — OUTPATIENT (OUTPATIENT)
Dept: OUTPATIENT SERVICES | Facility: HOSPITAL | Age: 69
LOS: 1 days | Discharge: HOME | End: 2019-05-01

## 2019-05-01 VITALS
RESPIRATION RATE: 16 BRPM | WEIGHT: 127.98 LBS | OXYGEN SATURATION: 98 % | BODY MASS INDEX: 23.41 KG/M2 | HEART RATE: 61 BPM | DIASTOLIC BLOOD PRESSURE: 65 MMHG | SYSTOLIC BLOOD PRESSURE: 110 MMHG

## 2019-05-01 DIAGNOSIS — Q38.4 CONGENITAL MALFORMATIONS OF SALIVARY GLANDS AND DUCTS: ICD-10-CM

## 2019-05-01 DIAGNOSIS — Z95.0 PRESENCE OF CARDIAC PACEMAKER: Chronic | ICD-10-CM

## 2019-05-01 DIAGNOSIS — Z90.49 ACQUIRED ABSENCE OF OTHER SPECIFIED PARTS OF DIGESTIVE TRACT: Chronic | ICD-10-CM

## 2019-05-01 DIAGNOSIS — H26.9 UNSPECIFIED CATARACT: Chronic | ICD-10-CM

## 2019-05-01 DIAGNOSIS — Z80.8 FAMILY HISTORY OF MALIGNANT NEOPLASM OF OTHER ORGANS OR SYSTEMS: ICD-10-CM

## 2019-05-01 PROCEDURE — 99204 OFFICE O/P NEW MOD 45 MIN: CPT | Mod: 25

## 2019-05-01 PROCEDURE — 77263 THER RADIOLOGY TX PLNG CPLX: CPT

## 2019-05-01 NOTE — VITALS
[Maximal Pain Intensity: 4/10] : 4/10 [Least Pain Intensity: 0/10] : 0/10 [Pain Description/Quality: ___] : Pain description/quality: [unfilled] [Pain Duration: ___] : Pain duration: [unfilled] [Pain Location: ___] : Pain Location: [unfilled] [Pain Interferes with ADLs] : Pain interferes with activities of daily living. [OTC] : OTC [80: Normal activity with effort; some signs or symptoms of disease.] : 80: Normal activity with effort; some signs or symptoms of disease.  [ECOG Performance Status: 1 - Restricted in physically strenuous activity but ambulatory and able to carry out work of a light or sedentary nature] : Performance Status: 1 - Restricted in physically strenuous activity but ambulatory and able to carry out work of a light or sedentary nature, e.g., light house work, office work

## 2019-05-02 NOTE — HISTORY OF PRESENT ILLNESS
[FreeTextEntry1] : 67 y/o male with h/o CABG with cardiac stents, pacemaker, HLD, h/o left parotidectomy for cancer 4/2019 presents for consideration of radiation treatment. 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 Close circumferential margin < 1 mm, + PNI. Immunostain are performed on block 2C and are positive for HER2- Mylene (Score: 3+), CK7, and ZAYRA-3. SOX-10, p40, p63, S100, TTF-1, CK20, calponin, and smooth muscle myosin heavy chain immunostains are negative. DOG-1 immunostain is focally positive.  The morphologic and immunohistochemical features are consistent with salivary duct carcinoma. Androgen receptor immunostain is pending and results will be issued as an addendum. Androgen receptor strongly and diffusely positive\par \par Today c/o left facial pain 3/10, on OTC meds PRN. h/o left parotidectomy with Dr Holguin 4/2019. Will see Med Onc soon this week. Pt has pacemaker from [facility], cardiologist Dr Snell [phone]\par \par Pathology report in 4/2019 showed high grade carcinoma of salivary gland, facial nerve involved. level 2 LN positive with cancer. \par \par PET scan ordered from ENT service\par \par \par

## 2019-05-02 NOTE — PHYSICAL EXAM
[FreeTextEntry1] : left facial palsy , rest of cranial nerves intact. , no oral mass, free mobile tongue. no cervical lymphadenopathy. \par \par

## 2019-05-02 NOTE — REVIEW OF SYSTEMS
[Negative] : Allergic/Immunologic [FreeTextEntry4] : h/o left parotid surgery 4/2019 [FreeTextEntry5] : CABG stent and pacemaker

## 2019-05-03 ENCOUNTER — OTHER (OUTPATIENT)
Age: 69
End: 2019-05-03

## 2019-05-03 ENCOUNTER — APPOINTMENT (OUTPATIENT)
Dept: HEMATOLOGY ONCOLOGY | Facility: CLINIC | Age: 69
End: 2019-05-03

## 2019-05-07 ENCOUNTER — APPOINTMENT (OUTPATIENT)
Dept: OTOLARYNGOLOGY | Facility: CLINIC | Age: 69
End: 2019-05-07
Payer: MEDICARE

## 2019-05-07 VITALS
TEMPERATURE: 97.8 F | WEIGHT: 127 LBS | BODY MASS INDEX: 23.37 KG/M2 | HEIGHT: 62 IN | HEART RATE: 69 BPM | OXYGEN SATURATION: 98 % | DIASTOLIC BLOOD PRESSURE: 70 MMHG | SYSTOLIC BLOOD PRESSURE: 121 MMHG

## 2019-05-07 PROCEDURE — 99024 POSTOP FOLLOW-UP VISIT: CPT

## 2019-05-12 NOTE — HISTORY OF PRESENT ILLNESS
[de-identified] : 68 year old male referred by ED for left facial mas.\par S/P L parotidectomy with sacrifice of the lower division of the facial nerve and skin, neck dissection and cervicofacial flap reconstruction on 4/17/19. Pain is under control. No fever or drainage. Returning to remove the rest of the stitches.

## 2019-05-12 NOTE — PHYSICAL EXAM
[de-identified] : Incisions C/D/I. Flap viable. No signs of infection. [Midline] : trachea located in midline position [Normal] : no rashes

## 2019-05-14 ENCOUNTER — APPOINTMENT (OUTPATIENT)
Dept: NUCLEAR MEDICINE | Facility: IMAGING CENTER | Age: 69
End: 2019-05-14

## 2019-05-27 ENCOUNTER — FORM ENCOUNTER (OUTPATIENT)
Age: 69
End: 2019-05-27

## 2019-05-28 ENCOUNTER — APPOINTMENT (OUTPATIENT)
Dept: NUCLEAR MEDICINE | Facility: IMAGING CENTER | Age: 69
End: 2019-05-28
Payer: MEDICARE

## 2019-05-28 ENCOUNTER — APPOINTMENT (OUTPATIENT)
Dept: HEMATOLOGY ONCOLOGY | Facility: CLINIC | Age: 69
End: 2019-05-28
Payer: MEDICARE

## 2019-05-28 ENCOUNTER — RESULT REVIEW (OUTPATIENT)
Age: 69
End: 2019-05-28

## 2019-05-28 ENCOUNTER — OUTPATIENT (OUTPATIENT)
Dept: OUTPATIENT SERVICES | Facility: HOSPITAL | Age: 69
LOS: 1 days | End: 2019-05-28
Payer: MEDICARE

## 2019-05-28 VITALS
SYSTOLIC BLOOD PRESSURE: 144 MMHG | OXYGEN SATURATION: 98 % | BODY MASS INDEX: 23.12 KG/M2 | HEART RATE: 67 BPM | WEIGHT: 125.66 LBS | TEMPERATURE: 98.2 F | HEIGHT: 61.97 IN | DIASTOLIC BLOOD PRESSURE: 85 MMHG | RESPIRATION RATE: 16 BRPM

## 2019-05-28 DIAGNOSIS — H91.93 UNSPECIFIED HEARING LOSS, BILATERAL: ICD-10-CM

## 2019-05-28 DIAGNOSIS — C07 MALIGNANT NEOPLASM OF PAROTID GLAND: ICD-10-CM

## 2019-05-28 DIAGNOSIS — R22.0 LOCALIZED SWELLING, MASS AND LUMP, HEAD: ICD-10-CM

## 2019-05-28 DIAGNOSIS — K11.9 DISEASE OF SALIVARY GLAND, UNSPECIFIED: ICD-10-CM

## 2019-05-28 DIAGNOSIS — Z95.0 PRESENCE OF CARDIAC PACEMAKER: Chronic | ICD-10-CM

## 2019-05-28 DIAGNOSIS — Z90.49 ACQUIRED ABSENCE OF OTHER SPECIFIED PARTS OF DIGESTIVE TRACT: Chronic | ICD-10-CM

## 2019-05-28 DIAGNOSIS — Z87.09 PERSONAL HISTORY OF OTHER DISEASES OF THE RESPIRATORY SYSTEM: ICD-10-CM

## 2019-05-28 DIAGNOSIS — H26.9 UNSPECIFIED CATARACT: Chronic | ICD-10-CM

## 2019-05-28 LAB
ALBUMIN SERPL ELPH-MCNC: 4.9 G/DL
ALP BLD-CCNC: 103 U/L
ALT SERPL-CCNC: 27 U/L
ANION GAP SERPL CALC-SCNC: 12 MMOL/L
APTT BLD: 32.7 SEC
AST SERPL-CCNC: 19 U/L
BASOPHILS # BLD AUTO: 0.1 K/UL — SIGNIFICANT CHANGE UP (ref 0–0.2)
BASOPHILS NFR BLD AUTO: 0.7 % — SIGNIFICANT CHANGE UP (ref 0–2)
BILIRUB SERPL-MCNC: 0.4 MG/DL
BUN SERPL-MCNC: 18 MG/DL
CALCIUM SERPL-MCNC: 9.5 MG/DL
CHLORIDE SERPL-SCNC: 100 MMOL/L
CO2 SERPL-SCNC: 22 MMOL/L
CREAT SERPL-MCNC: 1.32 MG/DL
EOSINOPHIL # BLD AUTO: 0.4 K/UL — SIGNIFICANT CHANGE UP (ref 0–0.5)
EOSINOPHIL NFR BLD AUTO: 4.4 % — SIGNIFICANT CHANGE UP (ref 0–6)
GLUCOSE SERPL-MCNC: 153 MG/DL
HCT VFR BLD CALC: 40.3 % — SIGNIFICANT CHANGE UP (ref 39–50)
HGB BLD-MCNC: 14.3 G/DL — SIGNIFICANT CHANGE UP (ref 13–17)
INR PPP: 1.03 RATIO
LYMPHOCYTES # BLD AUTO: 1.7 K/UL — SIGNIFICANT CHANGE UP (ref 1–3.3)
LYMPHOCYTES # BLD AUTO: 19.6 % — SIGNIFICANT CHANGE UP (ref 13–44)
MAGNESIUM SERPL-MCNC: 2 MG/DL
MCHC RBC-ENTMCNC: 31.4 PG — SIGNIFICANT CHANGE UP (ref 27–34)
MCHC RBC-ENTMCNC: 35.5 G/DL — SIGNIFICANT CHANGE UP (ref 32–36)
MCV RBC AUTO: 88.5 FL — SIGNIFICANT CHANGE UP (ref 80–100)
MONOCYTES # BLD AUTO: 0.8 K/UL — SIGNIFICANT CHANGE UP (ref 0–0.9)
MONOCYTES NFR BLD AUTO: 8.7 % — SIGNIFICANT CHANGE UP (ref 2–14)
NEUTROPHILS # BLD AUTO: 5.8 K/UL — SIGNIFICANT CHANGE UP (ref 1.8–7.4)
NEUTROPHILS NFR BLD AUTO: 66.5 % — SIGNIFICANT CHANGE UP (ref 43–77)
PHOSPHATE SERPL-MCNC: 3.4 MG/DL
PLATELET # BLD AUTO: 190 K/UL — SIGNIFICANT CHANGE UP (ref 150–400)
POTASSIUM SERPL-SCNC: 4.8 MMOL/L
PROT SERPL-MCNC: 7.5 G/DL
PT BLD: 11.9 SEC
RBC # BLD: 4.55 M/UL — SIGNIFICANT CHANGE UP (ref 4.2–5.8)
RBC # FLD: 11.9 % — SIGNIFICANT CHANGE UP (ref 10.3–14.5)
SODIUM SERPL-SCNC: 134 MMOL/L
TSH SERPL-ACNC: 1.75 UIU/ML
WBC # BLD: 8.7 K/UL — SIGNIFICANT CHANGE UP (ref 3.8–10.5)
WBC # FLD AUTO: 8.7 K/UL — SIGNIFICANT CHANGE UP (ref 3.8–10.5)

## 2019-05-28 PROCEDURE — A9552: CPT

## 2019-05-28 PROCEDURE — 99204 OFFICE O/P NEW MOD 45 MIN: CPT

## 2019-05-28 PROCEDURE — 78815 PET IMAGE W/CT SKULL-THIGH: CPT | Mod: 26,PI

## 2019-05-28 PROCEDURE — 78815 PET IMAGE W/CT SKULL-THIGH: CPT

## 2019-05-28 NOTE — HISTORY OF PRESENT ILLNESS
[de-identified] : Mr. LUIS BOOGIE is a pleasant 68 year old man who comes here today to be evaluated for his diagnosis of salivary duct carcinoma of the parotid. Referred by Dr. Holguin. \par \par Oncological history\par Mr. Boogie noted a mass on the left side of his face in the end of 2018. He eventually had a CT done due to pain, followed by FNA and surgical resection (as below).\par \par CT head 2/17/19\par Transpatial mass centered along the left superficial parotid lobe with findings suspicious for perineural spread of disease and dermal involvement of disease. An infectious process may be considered in the appropriate  clinical setting. Contrast-enhanced MR imaging is recommended for further evaluation of local regional disease extent. Dedicated neck imaging may be done to evaluate for neck lymphadenopathy.\par \par FNA left salivary gland 4/29/19- positive for neoplasm\par \par Parotidectomy with sacrifice of the lower division of the facial nerve on 4/17/19. Path report as below\par \par Final Diagnosis\par 1. Facial nerve lower division, biopsy\par - Nerve bundles involved by carcinoma\par \par 2. Parotid, left, parotidectomy\par - High-grade carcinoma of salivary gland origin consistent with salivary duct carcinoma, involving parotid gland and dermis, see comment and synoptic summary\par - Perineural invasion seen\par - Resection margins are negative for carcinoma\par - 3 lymph nodes negative for metastatic carcinoma\par \par 3. Left parotid node, excision\par - 1 lymph node negative for metastatic carcinoma\par \par 4. Lymph nodes, left level 3, neck dissection\par - 19 lymph nodes negative for metastatic carcinoma\par \par 5. Lymph nodes, left level 2, neck dissection\par - 1 out of 8 lymph nodes positive for metastatic carcinoma\par \par Immunostain are performed on block 2C and are positive for HER2- Mylene ( Score: 3+), CK7, and ZAYRA-3. SOX-10, p40, p63, S100, TTF-1, CK20, calponin, and smooth muscle myosin heavy chain immunostains are negative. DOG-1 immunostain is focally positive.  The morphologic and immunohistochemical features are consistent with salivary duct carcinoma. Androgen receptor immunostain is pending and results will be issued as an addendum. Androgen receptor strongly and diffusely positive\par \par PET-CT - pending\par     \par  [de-identified] : Mr. Allen is doing well. Denies facial pain. Eating well.  Has SOB on exertion. Chronic joint pain. Some hearing loss. All other ROS negative.

## 2019-05-28 NOTE — REASON FOR VISIT
[Initial Consultation] : an initial consultation [Spouse] : spouse [Other: _____] : [unfilled] [FreeTextEntry2] : salivary duct carcinoma

## 2019-05-28 NOTE — ASSESSMENT
[FreeTextEntry1] : Mr. LUIS BOOGIE is a pleasant 68 year old man who comes here today to be evaluated for his diagnosis of salivary duct carcinoma s/p surgery.\par \par I had a lengthy discussion with the patient about his diagnosis, prognosis, stage and treatment options. He had time to answer questions and all the questions were answered to his satisfaction.\par \par I do not think patient is a candidate for cisplatin due to comorbidities, frailty and hearing deficits, therefore, not a good candidate for RTOG 1008. We will get labs today on his way out. PET-CT also to be done today.\par \par \par Radha Ward MD\par , [facility]\par Section of Experimental Therapeutics\par Section of Head & Neck Cancer\par [facility]\par [address]\[place], [address]\par Tel: [phone]\par Fax: [phone]\par \par

## 2019-05-28 NOTE — PHYSICAL EXAM
[Restricted in physically strenuous activity but ambulatory and able to carry out work of a light or sedentary nature] : Status 1- Restricted in physically strenuous activity but ambulatory and able to carry out work of a light or sedentary nature, e.g., light house work, office work [Normal] : affect appropriate [de-identified] : surgical scar noted, no nodes

## 2019-05-29 LAB
HBV CORE IGG+IGM SER QL: NONREACTIVE
HBV CORE IGM SER QL: NONREACTIVE
HBV SURFACE AB SER QL: NONREACTIVE
HBV SURFACE AG SER QL: NONREACTIVE
HCV AB SER QL: NONREACTIVE
HCV S/CO RATIO: 0.13 S/CO

## 2019-06-06 PROCEDURE — 77300 RADIATION THERAPY DOSE PLAN: CPT | Mod: 26

## 2019-06-06 PROCEDURE — 77301 RADIOTHERAPY DOSE PLAN IMRT: CPT | Mod: 26

## 2019-06-06 PROCEDURE — 77338 DESIGN MLC DEVICE FOR IMRT: CPT | Mod: 26

## 2019-07-01 PROCEDURE — 77387B: CUSTOM | Mod: 26

## 2019-07-02 PROCEDURE — 77427 RADIATION TX MANAGEMENT X5: CPT

## 2019-07-02 PROCEDURE — 77387B: CUSTOM | Mod: 26

## 2019-07-03 ENCOUNTER — OTHER (OUTPATIENT)
Age: 69
End: 2019-07-03

## 2019-07-03 VITALS
SYSTOLIC BLOOD PRESSURE: 149 MMHG | WEIGHT: 127.21 LBS | RESPIRATION RATE: 18 BRPM | OXYGEN SATURATION: 100 % | BODY MASS INDEX: 23.29 KG/M2 | HEART RATE: 72 BPM | DIASTOLIC BLOOD PRESSURE: 76 MMHG

## 2019-07-03 PROCEDURE — 77387B: CUSTOM | Mod: 26

## 2019-07-03 PROCEDURE — 77331 SPECIAL RADIATION DOSIMETRY: CPT | Mod: 26

## 2019-07-03 NOTE — HISTORY OF PRESENT ILLNESS
[FreeTextEntry1] : 68 year old man with diagnosis of salivary duct carcinoma of the left parotid.\par 7/3/19 OTV\par 2/33 Fx\par Denies pain. States has difficulty opening mouth, eating small bites. Weight stable\par Skin and mouth care reinforced

## 2019-07-03 NOTE — DISEASE MANAGEMENT
[Pathological] : TNM Stage: p [IV] : IV [TTNM] : 4a [NTNM] : 1 [MTNM] : 0 [de-identified] : 0648 [de-identified] : Left Parotid/Neck [de-identified] : 400

## 2019-07-03 NOTE — REVIEW OF SYSTEMS
[Dysphagia: Grade 0] : Dysphagia: Grade 0 [Fatigue: Grade 0] : Fatigue: Grade 0 [Xerostomia: Grade 0] : Xerostomia: Grade 0 [Oral Pain: Grade 0] : Oral Pain: Grade 0 [Dysgeusia: Grade 0] : Dysgeusia: Grade 0 [Skin Hyperpigmentation: Grade 0] : Skin Hyperpigmentation: Grade 0 [Dermatitis Radiation: Grade 0] : Dermatitis Radiation: Grade 0

## 2019-07-03 NOTE — REASON FOR VISIT
[Routine On-Treatment] : a routine on-treatment visit for [Head and Neck Cancer] : head and neck cancer [Family Member] : family member

## 2019-07-05 PROCEDURE — 77387B: CUSTOM | Mod: 26

## 2019-07-08 PROCEDURE — 77387B: CUSTOM | Mod: 26

## 2019-07-09 VITALS
SYSTOLIC BLOOD PRESSURE: 133 MMHG | BODY MASS INDEX: 23.57 KG/M2 | WEIGHT: 128.75 LBS | DIASTOLIC BLOOD PRESSURE: 50 MMHG | RESPIRATION RATE: 16 BRPM | HEART RATE: 86 BPM | OXYGEN SATURATION: 99 %

## 2019-07-09 PROCEDURE — 77014: CPT | Mod: 26

## 2019-07-09 NOTE — REASON FOR VISIT
[Head and Neck Cancer] : head and neck cancer [Routine On-Treatment] : a routine on-treatment visit for [Family Member] : family member

## 2019-07-09 NOTE — HISTORY OF PRESENT ILLNESS
[FreeTextEntry1] : 68 year old man with diagnosis of salivary duct carcinoma of the left parotid.\par 7/9/19 OTV\par 5/33 Fx\par No changes in taste, continues to eat small frequent bites, weight stable.\par 7/3/19 OTV\par 2/33 Fx\par Denies pain. States has difficulty opening mouth, eating small bites. Weight stable\par Skin and mouth care reinforced

## 2019-07-09 NOTE — VITALS
[Maximal Pain Intensity: 0/10] : 0/10 [70: Cares for self; unalbe to carry on normal activity or do active work.] : 68: Cares for self; unable to carry on normal activity or do active work. [Least Pain Intensity: 0/10] : 0/10 [ECOG Performance Status: 2 - Ambulatory and capable of all self care but unable to carry out any work activities] : Performance Status: 2 - Ambulatory and capable of all self care but unable to carry out any work activities. Up and about more than 50% of waking hours

## 2019-07-09 NOTE — DISEASE MANAGEMENT
[Pathological] : TNM Stage: p [IV] : IV [TTNM] : 4a [NTNM] : 1 [MTNM] : 0 [de-identified] : 1000 [de-identified] : 6154 [de-identified] : Left Parotid/Neck

## 2019-07-10 PROCEDURE — 77427 RADIATION TX MANAGEMENT X5: CPT

## 2019-07-10 PROCEDURE — 77387B: CUSTOM | Mod: 26

## 2019-07-11 PROCEDURE — 77387B: CUSTOM | Mod: 26

## 2019-07-11 PROCEDURE — 77331 SPECIAL RADIATION DOSIMETRY: CPT | Mod: 26

## 2019-07-12 PROCEDURE — 77387B: CUSTOM | Mod: 26

## 2019-07-15 PROCEDURE — 77387B: CUSTOM | Mod: 26

## 2019-07-16 VITALS
SYSTOLIC BLOOD PRESSURE: 136 MMHG | HEART RATE: 68 BPM | BODY MASS INDEX: 23.39 KG/M2 | WEIGHT: 127.76 LBS | DIASTOLIC BLOOD PRESSURE: 72 MMHG | OXYGEN SATURATION: 100 % | RESPIRATION RATE: 16 BRPM

## 2019-07-16 PROCEDURE — 77014: CPT | Mod: 26

## 2019-07-16 NOTE — DISEASE MANAGEMENT
[Pathological] : TNM Stage: p [IV] : IV [TTNM] : 4a [NTNM] : 1 [MTNM] : 0 [de-identified] : 2000 [de-identified] : 5642 [de-identified] : Left Parotid/Neck

## 2019-07-16 NOTE — REVIEW OF SYSTEMS
[Dysphagia: Grade 0] : Dysphagia: Grade 0 [Fatigue: Grade 1 - Fatigue relieved by rest] : Fatigue: Grade 1 - Fatigue relieved by rest [Xerostomia: Grade 0] : Xerostomia: Grade 0 [Oral Pain: Grade 0] : Oral Pain: Grade 0 [Dysgeusia: Grade 1- Altered taste but no change in diet] : Dysgeusia: Grade 1 - Altered taste but no change in diet [Skin Hyperpigmentation: Grade 1 - Hyperpigmentation covering <10% BSA; no psychosocial impact] : Skin Hyperpigmentation: Grade 1 - Hyperpigmentation covering <10% BSA; no psychosocial impact [Dermatitis Radiation: Grade 0] : Dermatitis Radiation: Grade 0

## 2019-07-16 NOTE — VITALS
[Maximal Pain Intensity: 0/10] : 0/10 [Least Pain Intensity: 0/10] : 0/10 [70: Cares for self; unalbe to carry on normal activity or do active work.] : 70: Cares for self; unable to carry on normal activity or do active work.

## 2019-07-16 NOTE — HISTORY OF PRESENT ILLNESS
[FreeTextEntry1] : 68 year old man with diagnosis of salivary duct carcinoma of the left parotid.\par 7/16/19 OTV \par 10/33 Fx\par States having slight change in taste, but eating well. C/o increased fatigue\par 7/9/19 OTV\par 5/33 Fx\par No changes in taste, continues to eat small frequent bites, weight stable.\par 7/3/19 OTV\par 2/33 Fx\par Denies pain. States has difficulty opening mouth, eating small bites. Weight stable\par Skin and mouth care reinforced

## 2019-07-17 PROCEDURE — 77387B: CUSTOM | Mod: 26

## 2019-07-17 PROCEDURE — 77427 RADIATION TX MANAGEMENT X5: CPT

## 2019-07-18 PROCEDURE — 77387B: CUSTOM | Mod: 26

## 2019-07-19 PROCEDURE — 77387B: CUSTOM | Mod: 26

## 2019-07-22 PROCEDURE — 77387B: CUSTOM | Mod: 26

## 2019-07-23 ENCOUNTER — OTHER (OUTPATIENT)
Age: 69
End: 2019-07-23

## 2019-07-23 VITALS
RESPIRATION RATE: 16 BRPM | DIASTOLIC BLOOD PRESSURE: 73 MMHG | BODY MASS INDEX: 22.7 KG/M2 | OXYGEN SATURATION: 99 % | WEIGHT: 124.01 LBS | SYSTOLIC BLOOD PRESSURE: 144 MMHG | HEART RATE: 69 BPM

## 2019-07-23 PROCEDURE — 77014: CPT | Mod: 26

## 2019-07-23 NOTE — DISEASE MANAGEMENT
[Pathological] : TNM Stage: p [IV] : IV [TTNM] : 4a [MTNM] : 0 [NTNM] : 1 [de-identified] : 9300 [de-identified] : 2547 [de-identified] : Left Parotid/Neck

## 2019-07-23 NOTE — VITALS
[Least Pain Intensity: 0/10] : 0/10 [Maximal Pain Intensity: 5/10] : 5/10 [Pain Description/Quality: ___] : Pain description/quality: [unfilled] [Pain Duration: ___] : Pain duration: [unfilled] [Pain Location: ___] : Pain Location: [unfilled] [NoTreatment Scheduled] : no treatment scheduled [Pain Interferes with ADLs] : Pain interferes with activities of daily living. [70: Cares for self; unalbe to carry on normal activity or do active work.] : 68: Cares for self; unable to carry on normal activity or do active work. [ECOG Performance Status: 1 - Restricted in physically strenuous activity but ambulatory and able to carry out work of a light or sedentary nature] : Performance Status: 1 - Restricted in physically strenuous activity but ambulatory and able to carry out work of a light or sedentary nature, e.g., light house work, office work

## 2019-07-23 NOTE — HISTORY OF PRESENT ILLNESS
[FreeTextEntry1] : 68 year old man with diagnosis of salivary duct carcinoma of the left parotid.\par 7/23/19\par Increased pain causing decreased eating\par 7/16/19 OTV \par 10/33 Fx\par States having slight change in taste, but eating well. C/o increased fatigue\par 7/9/19 OTV\par 5/33 Fx\par No changes in taste, continues to eat small frequent bites, weight stable.\par 7/3/19 OTV\par 2/33 Fx\par Denies pain. States has difficulty opening mouth, eating small bites. Weight stable\par Skin and mouth care reinforced

## 2019-07-23 NOTE — REVIEW OF SYSTEMS
[Dysphagia: Grade 0] : Dysphagia: Grade 0 [Fatigue: Grade 1 - Fatigue relieved by rest] : Fatigue: Grade 1 - Fatigue relieved by rest [Xerostomia: Grade 1 - Symptomatic (e.g., dry or thick saliva) without significant dietary alteration; unstimulated saliva flow >0.2 ml/min] : Xerostomia: Grade 1 - Symptomatic (e.g., dry or thick saliva) without significant dietary alteration; unstimulated saliva flow >0.2 ml/min [Oral Pain: Grade 2 - Moderate pain; limiting instrumental ADL] : Oral Pain: Grade 2 - Moderate pain; limiting instrumental ADL [Dysgeusia: Grade 1- Altered taste but no change in diet] : Dysgeusia: Grade 1 - Altered taste but no change in diet [Dermatitis Radiation: Grade 0] : Dermatitis Radiation: Grade 0 [Skin Hyperpigmentation: Grade 1 - Hyperpigmentation covering <10% BSA; no psychosocial impact] : Skin Hyperpigmentation: Grade 1 - Hyperpigmentation covering <10% BSA; no psychosocial impact

## 2019-07-23 NOTE — REASON FOR VISIT
[Head and Neck Cancer] : head and neck cancer [Routine On-Treatment] : a routine on-treatment visit for [Spouse] : spouse [Family Member] : family member [[employer] ] : provided by [employer]   [FreeTextEntry1] : [id number] [FreeTextEntry2] : [place]

## 2019-07-24 PROCEDURE — 77427 RADIATION TX MANAGEMENT X5: CPT

## 2019-07-24 PROCEDURE — 77387B: CUSTOM | Mod: 26

## 2019-07-25 PROCEDURE — 77387B: CUSTOM | Mod: 26

## 2019-07-25 PROCEDURE — 77427 RADIATION TX MANAGEMENT X5: CPT

## 2019-07-26 PROCEDURE — 77387B: CUSTOM | Mod: 26

## 2019-07-29 PROCEDURE — 77387B: CUSTOM | Mod: 26

## 2019-07-30 ENCOUNTER — OTHER (OUTPATIENT)
Age: 69
End: 2019-07-30

## 2019-07-30 VITALS
BODY MASS INDEX: 22.5 KG/M2 | OXYGEN SATURATION: 98 % | RESPIRATION RATE: 16 BRPM | SYSTOLIC BLOOD PRESSURE: 122 MMHG | WEIGHT: 122.91 LBS | HEART RATE: 70 BPM | DIASTOLIC BLOOD PRESSURE: 70 MMHG

## 2019-07-30 PROCEDURE — 77014: CPT | Mod: 26

## 2019-07-30 NOTE — VITALS
[Least Pain Intensity: 0/10] : 0/10 [Pain Description/Quality: ___] : Pain description/quality: [unfilled] [NoTreatment Scheduled] : no treatment scheduled [Pain Interferes with ADLs] : Pain interferes with activities of daily living. [70: Cares for self; unalbe to carry on normal activity or do active work.] : 70: Cares for self; unable to carry on normal activity or do active work. [ECOG Performance Status: 1 - Restricted in physically strenuous activity but ambulatory and able to carry out work of a light or sedentary nature] : Performance Status: 1 - Restricted in physically strenuous activity but ambulatory and able to carry out work of a light or sedentary nature, e.g., light house work, office work [Maximal Pain Intensity: 7/10] : 7/10 [Pain Duration: ___] : Pain duration: [unfilled] [Pain Location: ___] : Pain Location: [unfilled]

## 2019-07-30 NOTE — HISTORY OF PRESENT ILLNESS
[FreeTextEntry1] : 68 year old man with diagnosis of salivary duct carcinoma of the left parotid.\par 7/30/19 OTV\par Continues to have decreased taste and pain to mouth and throat. lost 2 lbs. \par 7/23/19\par Increased pain causing decreased eating\par 7/16/19 OTV \par 10/33 Fx\par States having slight change in taste, but eating well. C/o increased fatigue\par 7/9/19 OTV\par 5/33 Fx\par No changes in taste, continues to eat small frequent bites, weight stable.\par 7/3/19 OTV\par 2/33 Fx\par Denies pain. States has difficulty opening mouth, eating small bites. Weight stable\par Skin and mouth care reinforced

## 2019-07-30 NOTE — REVIEW OF SYSTEMS
[Dysphagia: Grade 0] : Dysphagia: Grade 0 [Fatigue: Grade 1 - Fatigue relieved by rest] : Fatigue: Grade 1 - Fatigue relieved by rest [Xerostomia: Grade 1 - Symptomatic (e.g., dry or thick saliva) without significant dietary alteration; unstimulated saliva flow >0.2 ml/min] : Xerostomia: Grade 1 - Symptomatic (e.g., dry or thick saliva) without significant dietary alteration; unstimulated saliva flow >0.2 ml/min [Oral Pain: Grade 2 - Moderate pain; limiting instrumental ADL] : Oral Pain: Grade 2 - Moderate pain; limiting instrumental ADL [Skin Hyperpigmentation: Grade 1 - Hyperpigmentation covering <10% BSA; no psychosocial impact] : Skin Hyperpigmentation: Grade 1 - Hyperpigmentation covering <10% BSA; no psychosocial impact [Dermatitis Radiation: Grade 0] : Dermatitis Radiation: Grade 0 [Dysgeusia: Grade 2 - Altered taste with change in diet (e.g., oral supplements); noxious or unpleasant taste; loss of taste] : Dysgeusia: Grade 2 - Altered taste with change in diet (e.g., oral supplements); noxious or unpleasant taste; loss of taste

## 2019-07-30 NOTE — REASON FOR VISIT
[Routine On-Treatment] : a routine on-treatment visit for [Head and Neck Cancer] : head and neck cancer [Spouse] : spouse [[employer] ] : provided by [employer]   [FreeTextEntry1] : [id number] [FreeTextEntry2] : [place]assi

## 2019-07-30 NOTE — DISEASE MANAGEMENT
[Pathological] : TNM Stage: p [IV] : IV [TTNM] : 4a [MTNM] : 0 [NTNM] : 1 [de-identified] : 6073 [de-identified] : Left Parotid/Neck [de-identified] : 7246

## 2019-07-31 PROCEDURE — 77387B: CUSTOM | Mod: 26

## 2019-08-01 PROCEDURE — 77387B: CUSTOM | Mod: 26

## 2019-08-02 PROCEDURE — 77387B: CUSTOM | Mod: 26

## 2019-08-02 PROCEDURE — 77427 RADIATION TX MANAGEMENT X5: CPT

## 2019-08-05 PROCEDURE — 77387B: CUSTOM | Mod: 26

## 2019-08-06 ENCOUNTER — OTHER (OUTPATIENT)
Age: 69
End: 2019-08-06

## 2019-08-06 PROCEDURE — 77014: CPT | Mod: 26

## 2019-08-07 VITALS
WEIGHT: 120.7 LBS | HEART RATE: 73 BPM | SYSTOLIC BLOOD PRESSURE: 117 MMHG | OXYGEN SATURATION: 100 % | TEMPERATURE: 98.42 F | DIASTOLIC BLOOD PRESSURE: 70 MMHG | RESPIRATION RATE: 16 BRPM | BODY MASS INDEX: 22.1 KG/M2

## 2019-08-07 PROCEDURE — 77387B: CUSTOM | Mod: 26

## 2019-08-07 NOTE — HISTORY OF PRESENT ILLNESS
[FreeTextEntry1] : 68 year old man with diagnosis of salivary duct carcinoma of the left parotid.\par 8/7/19 OTV \par Lost 2 lbs this week, tolerating soft and liquids only. Seen by nutritionist. Taking magic mixture for pain with some relief. \par 7/30/19 OTV\par Continues to have decreased taste and pain to mouth and throat. lost 2 lbs. \par 7/23/19\par Increased pain causing decreased eating\par 7/16/19 OTV \par 10/33 Fx\par States having slight change in taste, but eating well. C/o increased fatigue\par 7/9/19 OTV\par 5/33 Fx\par No changes in taste, continues to eat small frequent bites, weight stable.\par 7/3/19 OTV\par 2/33 Fx\par Denies pain. States has difficulty opening mouth, eating small bites. Weight stable\par Skin and mouth care reinforced

## 2019-08-07 NOTE — VITALS
[Least Pain Intensity: 0/10] : 0/10 [Maximal Pain Intensity: 7/10] : 7/10 [Pain Description/Quality: ___] : Pain description/quality: [unfilled] [Pain Duration: ___] : Pain duration: [unfilled] [Pain Location: ___] : Pain Location: [unfilled] [Pain Interferes with ADLs] : Pain interferes with activities of daily living. [70: Cares for self; unalbe to carry on normal activity or do active work.] : 70: Cares for self; unable to carry on normal activity or do active work. [ECOG Performance Status: 1 - Restricted in physically strenuous activity but ambulatory and able to carry out work of a light or sedentary nature] : Performance Status: 1 - Restricted in physically strenuous activity but ambulatory and able to carry out work of a light or sedentary nature, e.g., light house work, office work

## 2019-08-07 NOTE — DISEASE MANAGEMENT
[Pathological] : TNM Stage: p [IV] : IV [TTNM] : 4a [NTNM] : 1 [MTNM] : 0 [de-identified] : 2152 [de-identified] : 3462 [de-identified] : Left Parotid/Neck

## 2019-08-07 NOTE — REVIEW OF SYSTEMS
[Fatigue: Grade 1 - Fatigue relieved by rest] : Fatigue: Grade 1 - Fatigue relieved by rest [Xerostomia: Grade 1 - Symptomatic (e.g., dry or thick saliva) without significant dietary alteration; unstimulated saliva flow >0.2 ml/min] : Xerostomia: Grade 1 - Symptomatic (e.g., dry or thick saliva) without significant dietary alteration; unstimulated saliva flow >0.2 ml/min [Oral Pain: Grade 2 - Moderate pain; limiting instrumental ADL] : Oral Pain: Grade 2 - Moderate pain; limiting instrumental ADL [Dysgeusia: Grade 2 - Altered taste with change in diet (e.g., oral supplements); noxious or unpleasant taste; loss of taste] : Dysgeusia: Grade 2 - Altered taste with change in diet (e.g., oral supplements); noxious or unpleasant taste; loss of taste [Skin Hyperpigmentation: Grade 1 - Hyperpigmentation covering <10% BSA; no psychosocial impact] : Skin Hyperpigmentation: Grade 1 - Hyperpigmentation covering <10% BSA; no psychosocial impact [Dysphagia: Grade 1 - Symptomatic, able to eat regular diet] : Dysphagia: Grade 1 - Symptomatic, able to eat regular diet [Dermatitis Radiation: Grade 1 - Faint erythema or dry desquamation] : Dermatitis Radiation: Grade 1 - Faint erythema or dry desquamation

## 2019-08-07 NOTE — REASON FOR VISIT
[Routine On-Treatment] : a routine on-treatment visit for [Head and Neck Cancer] : head and neck cancer [Spouse] : spouse [[employer] ] : provided by [employer]   [FreeTextEntry1] : [id number] [FreeTextEntry2] : Elvia

## 2019-08-08 PROCEDURE — 77387B: CUSTOM | Mod: 26

## 2019-08-09 PROCEDURE — 77427 RADIATION TX MANAGEMENT X5: CPT

## 2019-08-09 PROCEDURE — 77387B: CUSTOM | Mod: 26

## 2019-08-12 PROCEDURE — 77387B: CUSTOM | Mod: 26

## 2019-08-13 VITALS
DIASTOLIC BLOOD PRESSURE: 69 MMHG | OXYGEN SATURATION: 100 % | TEMPERATURE: 98.42 F | BODY MASS INDEX: 22.68 KG/M2 | RESPIRATION RATE: 15 BRPM | HEART RATE: 76 BPM | SYSTOLIC BLOOD PRESSURE: 115 MMHG | HEIGHT: 61 IN | WEIGHT: 120.15 LBS

## 2019-08-13 PROCEDURE — 77014: CPT | Mod: 26

## 2019-08-13 NOTE — REVIEW OF SYSTEMS
[Dysphagia: Grade 1 - Symptomatic, able to eat regular diet] : Dysphagia: Grade 1 - Symptomatic, able to eat regular diet [Fatigue: Grade 1 - Fatigue relieved by rest] : Fatigue: Grade 1 - Fatigue relieved by rest [Xerostomia: Grade 1 - Symptomatic (e.g., dry or thick saliva) without significant dietary alteration; unstimulated saliva flow >0.2 ml/min] : Xerostomia: Grade 1 - Symptomatic (e.g., dry or thick saliva) without significant dietary alteration; unstimulated saliva flow >0.2 ml/min [Oral Pain: Grade 2 - Moderate pain; limiting instrumental ADL] : Oral Pain: Grade 2 - Moderate pain; limiting instrumental ADL [Dysgeusia: Grade 2 - Altered taste with change in diet (e.g., oral supplements); noxious or unpleasant taste; loss of taste] : Dysgeusia: Grade 2 - Altered taste with change in diet (e.g., oral supplements); noxious or unpleasant taste; loss of taste [Skin Hyperpigmentation: Grade 1 - Hyperpigmentation covering <10% BSA; no psychosocial impact] : Skin Hyperpigmentation: Grade 1 - Hyperpigmentation covering <10% BSA; no psychosocial impact [Dermatitis Radiation: Grade 1 - Faint erythema or dry desquamation] : Dermatitis Radiation: Grade 1 - Faint erythema or dry desquamation

## 2019-08-13 NOTE — HISTORY OF PRESENT ILLNESS
[FreeTextEntry1] : 68 year old man with diagnosis of salivary duct carcinoma of the left parotid.\par 8/13/19 OTV \par Continues to have pain in throat, tolerating soft and liquids only. Continues taking magic mixture. Weight maintained this week. \par 8/7/19 OTV \par Lost 2 lbs this week, tolerating soft and liquids only. Seen by nutritionist. Taking magic mixture for pain with some relief. \par 7/30/19 OTV\par Continues to have decreased taste and pain to mouth and throat. lost 2 lbs. \par 7/23/19\par Increased pain causing decreased eating\par 7/16/19 OTV \par 10/33 Fx\par States having slight change in taste, but eating well. C/o increased fatigue\par 7/9/19 OTV\par 5/33 Fx\par No changes in taste, continues to eat small frequent bites, weight stable.\par 7/3/19 OTV\par 2/33 Fx\par Denies pain. States has difficulty opening mouth, eating small bites. Weight stable\par Skin and mouth care reinforced

## 2019-08-13 NOTE — DISEASE MANAGEMENT
[Pathological] : TNM Stage: p [IV] : IV [TTNM] : 4a [NTNM] : 1 [MTNM] : 0 [de-identified] : 7794 [de-identified] : Left Parotid/Neck

## 2019-08-13 NOTE — REASON FOR VISIT
[Routine On-Treatment] : a routine on-treatment visit for [Head and Neck Cancer] : head and neck cancer [Spouse] : spouse [[employer] ] : provided by [employer]   [FreeTextEntry1] : [id number] [FreeTextEntry2] : Micki

## 2019-08-13 NOTE — VITALS
[Maximal Pain Intensity: 7/10] : 7/10 [Least Pain Intensity: 0/10] : 0/10 [Pain Description/Quality: ___] : Pain description/quality: [unfilled] [Pain Duration: ___] : Pain duration: [unfilled] [Pain Location: ___] : Pain Location: [unfilled] [Pain Interferes with ADLs] : Pain interferes with activities of daily living. [ECOG Performance Status: 1 - Restricted in physically strenuous activity but ambulatory and able to carry out work of a light or sedentary nature] : Performance Status: 1 - Restricted in physically strenuous activity but ambulatory and able to carry out work of a light or sedentary nature, e.g., light house work, office work [70: Cares for self; unalbe to carry on normal activity or do active work.] : 70: Cares for self; unable to carry on normal activity or do active work.

## 2019-08-14 PROCEDURE — 77387B: CUSTOM | Mod: 26

## 2019-08-15 PROCEDURE — 77387B: CUSTOM | Mod: 26

## 2019-08-16 PROCEDURE — 77387B: CUSTOM | Mod: 26

## 2019-09-30 ENCOUNTER — APPOINTMENT (OUTPATIENT)
Dept: RADIATION ONCOLOGY | Facility: CLINIC | Age: 69
End: 2019-09-30
Payer: MEDICARE

## 2019-09-30 VITALS
BODY MASS INDEX: 21.73 KG/M2 | SYSTOLIC BLOOD PRESSURE: 129 MMHG | DIASTOLIC BLOOD PRESSURE: 76 MMHG | TEMPERATURE: 98.6 F | WEIGHT: 115 LBS | OXYGEN SATURATION: 100 % | RESPIRATION RATE: 16 BRPM | HEART RATE: 76 BPM

## 2019-09-30 PROCEDURE — 99024 POSTOP FOLLOW-UP VISIT: CPT

## 2019-09-30 RX ORDER — LINAGLIPTIN 5 MG/1
TABLET, FILM COATED ORAL
Refills: 0 | Status: DISCONTINUED | COMMUNITY
End: 2019-09-30

## 2019-09-30 NOTE — VITALS
[Least Pain Intensity: 0/10] : 0/10 [Pain Description/Quality: ___] : Pain description/quality: [unfilled] [Pain Duration: ___] : Pain duration: [unfilled] [Pain Location: ___] : Pain Location: [unfilled] [OTC] : OTC [Pain Interferes with ADLs] : Pain interferes with activities of daily living. [80: Normal activity with effort; some signs or symptoms of disease.] : 80: Normal activity with effort; some signs or symptoms of disease.  [ECOG Performance Status: 1 - Restricted in physically strenuous activity but ambulatory and able to carry out work of a light or sedentary nature] : Performance Status: 1 - Restricted in physically strenuous activity but ambulatory and able to carry out work of a light or sedentary nature, e.g., light house work, office work [Maximal Pain Intensity: 3/10] : 3/10

## 2019-10-01 NOTE — PHYSICAL EXAM
[FreeTextEntry1] : Oral cavity, free mobile tongue. no mass. no cervical or preparotid lymphadenopathy

## 2019-10-01 NOTE — REVIEW OF SYSTEMS
[Negative] : Allergic/Immunologic [Fatigue: Grade 1 - Fatigue relieved by rest] : Fatigue: Grade 1 - Fatigue relieved by rest [FreeTextEntry4] : h/o left parotid surgery 4/2019 [FreeTextEntry5] : CABG stent and pacemaker [FreeTextEntry1] : left ear itching and fullness

## 2019-10-01 NOTE — HISTORY OF PRESENT ILLNESS
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 s/p left radical parotidectomy +PN1 lowrer division of facial nerve, close circumrenal margin and positive nodes. He was treated with adjuvant radiation to a total dose of 6,600cGy to the left parotid, completed treatment on 8/16/19.\par \par History of CABG with cardiac stents, pacemaker, HLD. Pt has pacemaker from [facility], cardiologist Dr Snell [phone]\par \par Today c/o left facial pain 3/10, on OTC Tylenol prn.Eats only bland meals, unable to tolerate spicy food. Encouraged to continue on high zaire/protein, noted with weight not improving. He has c/o left ear fullness and itching. He has not followed up with Dr Deleon, encouraged to schedule f/u soon. \par

## 2019-10-01 NOTE — REASON FOR VISIT
[Post-Treatment Evaluation] : post-treatment evaluation for [Head and Neck Cancer] : head and neck cancer [Spouse] : spouse [Family Member] : family member [Patient Declined  Services] : - None: Patient declined  services [FreeTextEntry2] : Preferred son to translate [TWNoteComboBox1] : Sharona

## 2019-10-14 ENCOUNTER — APPOINTMENT (OUTPATIENT)
Dept: RADIATION ONCOLOGY | Facility: CLINIC | Age: 69
End: 2019-10-14
Payer: MEDICARE

## 2019-10-14 VITALS
DIASTOLIC BLOOD PRESSURE: 75 MMHG | TEMPERATURE: 97.52 F | OXYGEN SATURATION: 100 % | HEART RATE: 70 BPM | HEIGHT: 60 IN | WEIGHT: 115 LBS | SYSTOLIC BLOOD PRESSURE: 125 MMHG | BODY MASS INDEX: 22.58 KG/M2 | RESPIRATION RATE: 16 BRPM

## 2019-10-14 PROCEDURE — 99024 POSTOP FOLLOW-UP VISIT: CPT

## 2019-10-14 NOTE — VITALS
[Maximal Pain Intensity: 2/10] : 2/10 [Least Pain Intensity: 0/10] : 0/10 [Pain Duration: ___] : Pain duration: [unfilled] [Pain Location: ___] : Pain Location: [unfilled] [NoTreatment Scheduled] : no treatment scheduled [ECOG Performance Status: 2 - Ambulatory and capable of all self care but unable to carry out any work activities] : Performance Status: 2 - Ambulatory and capable of all self care but unable to carry out any work activities. Up and about more than 50% of waking hours [80: Normal activity with effort; some signs or symptoms of disease.] : 80: Normal activity with effort; some signs or symptoms of disease.

## 2019-10-14 NOTE — PHYSICAL EXAM
[FreeTextEntry1] : patient presented with painful skin nodules at the nose ... p[possible infection

## 2019-10-14 NOTE — REVIEW OF SYSTEMS
[Constipation: Grade 0] : Constipation: Grade 0 [Nausea: Grade 0] : Nausea: Grade 0 [Diarrhea: Grade 0] : Diarrhea: Grade 0 [Dysphagia: Grade 0] : Dysphagia: Grade 0 [Tinnitus - Grade 0] : Tinnitus - Grade 0 [Blurred Vision: Grade 0] : Blurred Vision: Grade 0 [Mucositis Oral: Grade 0] : Mucositis Oral: Grade 0  [Xerostomia: Grade 1 - Symptomatic (e.g., dry or thick saliva) without significant dietary alteration; unstimulated saliva flow >0.2 ml/min] : Xerostomia: Grade 1 - Symptomatic (e.g., dry or thick saliva) without significant dietary alteration; unstimulated saliva flow >0.2 ml/min [Salivary duct inflammation: Grade 0] : Salivary duct inflammation: Grade 0 [Oral Pain: Grade 0] : Oral Pain: Grade 0 [Dysgeusia: Grade 0] : Dysgeusia: Grade 0 [Alopecia: Grade 0] : Alopecia: Grade 0 [Pruritus: Grade 0] : Pruritus: Grade 0 [Skin Atrophy: Grade 0] : Skin Atrophy: Grade 0 [Skin Hyperpigmentation: Grade 0] : Skin Hyperpigmentation: Grade 0 [Skin Induration: Grade 0] : Skin Induration: Grade 0 [Dermatitis Radiation: Grade 0] : Dermatitis Radiation: Grade 0

## 2019-10-18 ENCOUNTER — INPATIENT (INPATIENT)
Facility: HOSPITAL | Age: 69
LOS: 5 days | Discharge: HOME HEALTH | End: 2019-10-24
Attending: HOSPITALIST | Admitting: HOSPITALIST
Payer: MEDICARE

## 2019-10-18 VITALS
RESPIRATION RATE: 18 BRPM | TEMPERATURE: 100 F | HEART RATE: 84 BPM | SYSTOLIC BLOOD PRESSURE: 110 MMHG | DIASTOLIC BLOOD PRESSURE: 62 MMHG | OXYGEN SATURATION: 100 %

## 2019-10-18 DIAGNOSIS — Z95.0 PRESENCE OF CARDIAC PACEMAKER: Chronic | ICD-10-CM

## 2019-10-18 DIAGNOSIS — H26.9 UNSPECIFIED CATARACT: Chronic | ICD-10-CM

## 2019-10-18 DIAGNOSIS — Z90.49 ACQUIRED ABSENCE OF OTHER SPECIFIED PARTS OF DIGESTIVE TRACT: Chronic | ICD-10-CM

## 2019-10-18 LAB
ALBUMIN SERPL ELPH-MCNC: 3.5 G/DL — SIGNIFICANT CHANGE UP (ref 3.3–5)
ALP SERPL-CCNC: 77 U/L — SIGNIFICANT CHANGE UP (ref 40–120)
ALT FLD-CCNC: 18 U/L — SIGNIFICANT CHANGE UP (ref 4–41)
ANION GAP SERPL CALC-SCNC: 10 MMO/L — SIGNIFICANT CHANGE UP (ref 7–14)
AST SERPL-CCNC: 15 U/L — SIGNIFICANT CHANGE UP (ref 4–40)
BASE EXCESS BLDV CALC-SCNC: 2.2 MMOL/L — SIGNIFICANT CHANGE UP
BASOPHILS # BLD AUTO: 0.02 K/UL — SIGNIFICANT CHANGE UP (ref 0–0.2)
BASOPHILS NFR BLD AUTO: 0.1 % — SIGNIFICANT CHANGE UP (ref 0–2)
BILIRUB SERPL-MCNC: 0.5 MG/DL — SIGNIFICANT CHANGE UP (ref 0.2–1.2)
BLOOD GAS VENOUS - CREATININE: SIGNIFICANT CHANGE UP MG/DL (ref 0.5–1.3)
BUN SERPL-MCNC: 33 MG/DL — HIGH (ref 7–23)
CALCIUM SERPL-MCNC: 9 MG/DL — SIGNIFICANT CHANGE UP (ref 8.4–10.5)
CHLORIDE BLDV-SCNC: 94 MMOL/L — LOW (ref 96–108)
CHLORIDE SERPL-SCNC: 92 MMOL/L — LOW (ref 98–107)
CO2 SERPL-SCNC: 24 MMOL/L — SIGNIFICANT CHANGE UP (ref 22–31)
CREAT SERPL-MCNC: 1.27 MG/DL — SIGNIFICANT CHANGE UP (ref 0.5–1.3)
EOSINOPHIL # BLD AUTO: 0.01 K/UL — SIGNIFICANT CHANGE UP (ref 0–0.5)
EOSINOPHIL NFR BLD AUTO: 0.1 % — SIGNIFICANT CHANGE UP (ref 0–6)
GAS PNL BLDV: 121 MMOL/L — LOW (ref 136–146)
GLUCOSE BLDV-MCNC: 138 MG/DL — HIGH (ref 70–99)
GLUCOSE SERPL-MCNC: 141 MG/DL — HIGH (ref 70–99)
HCO3 BLDV-SCNC: 24 MMOL/L — SIGNIFICANT CHANGE UP (ref 20–27)
HCT VFR BLD CALC: 41.3 % — SIGNIFICANT CHANGE UP (ref 39–50)
HCT VFR BLDV CALC: 45.3 % — SIGNIFICANT CHANGE UP (ref 39–51)
HGB BLD-MCNC: 14.3 G/DL — SIGNIFICANT CHANGE UP (ref 13–17)
HGB BLDV-MCNC: 14.8 G/DL — SIGNIFICANT CHANGE UP (ref 13–17)
IMM GRANULOCYTES NFR BLD AUTO: 1 % — SIGNIFICANT CHANGE UP (ref 0–1.5)
LACTATE BLDV-MCNC: 2.1 MMOL/L — HIGH (ref 0.5–2)
LYMPHOCYTES # BLD AUTO: 0.65 K/UL — LOW (ref 1–3.3)
LYMPHOCYTES # BLD AUTO: 3.4 % — LOW (ref 13–44)
MCHC RBC-ENTMCNC: 29.9 PG — SIGNIFICANT CHANGE UP (ref 27–34)
MCHC RBC-ENTMCNC: 34.6 % — SIGNIFICANT CHANGE UP (ref 32–36)
MCV RBC AUTO: 86.4 FL — SIGNIFICANT CHANGE UP (ref 80–100)
MONOCYTES # BLD AUTO: 1.85 K/UL — HIGH (ref 0–0.9)
MONOCYTES NFR BLD AUTO: 9.6 % — SIGNIFICANT CHANGE UP (ref 2–14)
NEUTROPHILS # BLD AUTO: 16.53 K/UL — HIGH (ref 1.8–7.4)
NEUTROPHILS NFR BLD AUTO: 85.8 % — HIGH (ref 43–77)
NRBC # FLD: 0 K/UL — SIGNIFICANT CHANGE UP (ref 0–0)
PCO2 BLDV: 49 MMHG — SIGNIFICANT CHANGE UP (ref 41–51)
PH BLDV: 7.36 PH — SIGNIFICANT CHANGE UP (ref 7.32–7.43)
PLATELET # BLD AUTO: 248 K/UL — SIGNIFICANT CHANGE UP (ref 150–400)
PMV BLD: 10 FL — SIGNIFICANT CHANGE UP (ref 7–13)
PO2 BLDV: < 24 MMHG — LOW (ref 35–40)
POTASSIUM BLDV-SCNC: 5.5 MMOL/L — HIGH (ref 3.4–4.5)
POTASSIUM SERPL-MCNC: 4.4 MMOL/L — SIGNIFICANT CHANGE UP (ref 3.5–5.3)
POTASSIUM SERPL-SCNC: 4.4 MMOL/L — SIGNIFICANT CHANGE UP (ref 3.5–5.3)
PROT SERPL-MCNC: 6.5 G/DL — SIGNIFICANT CHANGE UP (ref 6–8.3)
RBC # BLD: 4.78 M/UL — SIGNIFICANT CHANGE UP (ref 4.2–5.8)
RBC # FLD: 13.1 % — SIGNIFICANT CHANGE UP (ref 10.3–14.5)
SAO2 % BLDV: 39.3 % — LOW (ref 60–85)
SODIUM SERPL-SCNC: 126 MMOL/L — LOW (ref 135–145)
TROPONIN T, HIGH SENSITIVITY: 21 NG/L — SIGNIFICANT CHANGE UP (ref ?–14)
WBC # BLD: 19.26 K/UL — HIGH (ref 3.8–10.5)
WBC # FLD AUTO: 19.26 K/UL — HIGH (ref 3.8–10.5)

## 2019-10-18 PROCEDURE — 71260 CT THORAX DX C+: CPT | Mod: 26

## 2019-10-18 RX ORDER — SODIUM CHLORIDE 9 MG/ML
1000 INJECTION INTRAMUSCULAR; INTRAVENOUS; SUBCUTANEOUS ONCE
Refills: 0 | Status: COMPLETED | OUTPATIENT
Start: 2019-10-18 | End: 2019-10-18

## 2019-10-18 RX ADMIN — SODIUM CHLORIDE 1000 MILLILITER(S): 9 INJECTION INTRAMUSCULAR; INTRAVENOUS; SUBCUTANEOUS at 23:44

## 2019-10-18 NOTE — ED PROVIDER NOTE - ATTENDING CONTRIBUTION TO CARE
Skinny: 69 yom with DM, HTN, elev chol, s/p throat cancer last radiation 6 weeks ago. Pt noted facial swelling starting yesterday. Pt had some pain in nose first and very itchy, today noted bilateral inferior orbital edema. No fever. No cp, no sob. Pt is well appearing, no distress, +soft edema of bilateral inferior orbital areas, EOMI, non tender, mild tn to nose which is also edematous with crusted skin lesion. +skin changes redness of skin (post radiation). Concern for clot, will check labs and CT. If negative can followup outpt.

## 2019-10-18 NOTE — ED ADULT NURSE NOTE - OBJECTIVE STATEMENT
Pt. A&Ox3, primarily Occitan speaking with family at bedside for translation. Pt. c/o weakness x 1 wk. Swelling noted to under eyes, nose and lips since last night. Also states he had sob last night which has since resolved. Denies sob at this time. Denies cp, dizziness, palpitations, cough, n/v/d or fevers. #20g IV placed to right AC, labs sent. MD at bedside for eval. VSS, breathing well on RA. Respirations even and unlabored. Ambulates with cane at baseline. Awiating results. Will continue to monitor.

## 2019-10-18 NOTE — ED PROVIDER NOTE - CLINICAL SUMMARY MEDICAL DECISION MAKING FREE TEXT BOX
70 yo M with hx of DM, HTN, HLD, R throat tumor treated with surgery and radiation (last rad 1 month ago), CAD s/p pacemaker and stent 8 years ago (cardiologist is Dr. Camille Snell) (pcp  Dr. Colette Hair) and CKD presents with face swelling for 1 day, weakness for 1 week, and SOB last nigth with no associated chest pain, palpitations or lightheadedness. Vitals unremarkable. Symetrical non-tender, nonerythematous swelling below the inferior eyelids.  Superficial nose skin irritation with no masses inside the nostrils. Left anterior neck scar tissue with palpable mass, non-tender, non-infected. Given neck tumor and facial swelling, will evaluate for SVC syndrome. with chest CT. No concern for airway obstruction. Will do ACS workup given SOB, weakness and abnormal EKG.

## 2019-10-18 NOTE — ED PROVIDER NOTE - NS ED ROS FT
GENERAL: No fever or chills  EYES: no change in vision  HEENT: no trouble swallowing or speaking, face swelling see hpi. Superficial nose skin irritation and pain.   CARDIAC: no chest pain or palpitations   PULMONARY: no cough or SOB  GI: no abdominal pain, nausea, vomiting, diarrhea, or constipation   : No changes in urination  SKIN: no rashes  NEURO: no headache, numbness, or weakness.  MSK: No joint pain

## 2019-10-18 NOTE — ED ADULT NURSE NOTE - NSIMPLEMENTINTERV_GEN_ALL_ED
Implemented All Fall Risk Interventions:  [place] to call system. Call bell, personal items and telephone within reach. Instruct patient to call for assistance. Room bathroom lighting operational. Non-slip footwear when patient is off stretcher. Physically safe environment: no spills, clutter or unnecessary equipment. Stretcher in lowest position, wheels locked, appropriate side rails in place. Provide visual cue, wrist band, yellow gown, etc. Monitor gait and stability. Monitor for mental status changes and reorient to person, place, and time. Review medications for side effects contributing to fall risk. Reinforce activity limits and safety measures with patient and family.

## 2019-10-18 NOTE — ED PROVIDER NOTE - OBJECTIVE STATEMENT
70 yo M with hx of DM, HTN, HLD, R throat tumor treated with surgery and radiation (last rad 1 month ago), CAD s/p pacemaker and stent 8 years ago (cardiologist is Dr. Camille Snell) (pcp  Dr. Colette Hair) and CKD presents with face swelling for 1 day and weakness for 1 week. Reports having symmetrical face swelling worse under the eyelids, non-painful with no fevers. Reports SOB last night while laying supine denied chest pain, palpitations, lightheadedness, N, V or blurry vision.  He is currently asymptomatic. Denies swelling of his abdomen or lower extremities. Denies neck pressure, tongue swelling, neck swelling and has normal neck ROM. Patient reports weakness for 1 week; has been able to ambulate and move all extremities, as normal PO itnake.

## 2019-10-18 NOTE — ED PROVIDER NOTE - PHYSICAL EXAMINATION
Gen: AAOx3, non-toxic  Head: NCAT  HEENT: EOMI, oral mucosa moist, normal conjunctiva. Symmetrical non-tender, nonerythematous swelling below the inferior eyelids.  Superficial nose skin irritation with no masses inside the nostrils. Left anterior neck scar tissue with palpable mass, non-tender, non-infected.   Lung: CTAB, no respiratory distress, no wheezes/rhonchi/rales B/L, speaking in full sentences  CV: RRR, no murmurs, rubs or gallops  Abd: soft, NTND, no guarding, no CVA tenderness, no pitting edema.   MSK: no visible deformities  Neuro: No focal sensory or motor deficits, normal CN exam   Skin: Warm, well perfused, no rash  Psych: normal affect.

## 2019-10-18 NOTE — ED PROVIDER NOTE - PROGRESS NOTE DETAILS
Breathing comfortably on RA, speaking full sentence. No concern for airway obstruction. Will admit for cellulits and IV antibiotics

## 2019-10-18 NOTE — ED ADULT TRIAGE NOTE - CHIEF COMPLAINT QUOTE
via interpretor,c/o weakness pt with tumor to throat 4/17. surgery with radiation. denies vomiting. last  radiation completed 8/16. denies fever,pain. fs 166 via interpretor,c/o weakness pt with tumor to throat 4/17. surgery with radiation. denies vomiting. last  radiation completed 8/16. denies fever,pain. fs 166 swelling to face noted

## 2019-10-18 NOTE — ED PROVIDER NOTE - INTERPRETER'S NAME
[facility] [address], [place] [address]

Test Date:    2018               Test Time:    00:25:55

Pat Name:     SRAVANI BROWN                Department:   

Patient ID:   [id number]           Room:          

Gender:       M                        Technician:   PORSHA

:          1955               Requested By: HENRI CLAY

Order Number: [id number]            Reading MD:     

                                 Measurements

Intervals                              Axis          

Rate:         102                      P:            83

IN:           138                      QRS:          63

QRSD:         86                       T:            78

QT:           310                                    

QTc:          408                                    

                           Interpretive Statements

SINUS TACHYCARDIA

RIGHT ATRIAL ENLARGEMENT

R-S TRANSITION ZONE IN V LEADS DISPLACED TO THE LEFT

S1,S2,S3 PATTERN

POSSIBLY ABNORMAL ECG

RI6.01

Compared to ECG 2018 20:37:48

Atrial abnormality now present

Sinus rhythm no longer present max

## 2019-10-18 NOTE — ED ADULT NURSE REASSESSMENT NOTE - NS ED NURSE REASSESS COMMENT FT1
Received report for [place]. Pt a&ox3 and ambulatory with cane at baseline. Pt c/o periorbital and nose swelling x 10days.  Pt reports HA.  Pt denies dizziness, burning with urination, or SOB.  Pt primarily Khmer speaking refusing interrpreter services at this time and requesting in english for family at bedside to translate.  Family at bedside translating.  Vital signs as noted, call bell in reach, comfort and safety measures provided.  Will continue to monitor for safety and comfort. Pt awaiting lab results and CT at this time.

## 2019-10-18 NOTE — ED ADULT NURSE NOTE - CHIEF COMPLAINT QUOTE
via interpretor,c/o weakness pt with tumor to throat 4/17. surgery with radiation. denies vomiting. last  radiation completed 8/16. denies fever,pain. fs 166 swelling to face noted

## 2019-10-19 DIAGNOSIS — L01.00 IMPETIGO, UNSPECIFIED: ICD-10-CM

## 2019-10-19 DIAGNOSIS — L03.90 CELLULITIS, UNSPECIFIED: ICD-10-CM

## 2019-10-19 DIAGNOSIS — E87.1 HYPO-OSMOLALITY AND HYPONATREMIA: ICD-10-CM

## 2019-10-19 DIAGNOSIS — R53.1 WEAKNESS: ICD-10-CM

## 2019-10-19 DIAGNOSIS — D49.0 NEOPLASM OF UNSPECIFIED BEHAVIOR OF DIGESTIVE SYSTEM: ICD-10-CM

## 2019-10-19 DIAGNOSIS — D72.829 ELEVATED WHITE BLOOD CELL COUNT, UNSPECIFIED: ICD-10-CM

## 2019-10-19 DIAGNOSIS — R06.00 DYSPNEA, UNSPECIFIED: ICD-10-CM

## 2019-10-19 DIAGNOSIS — N28.9 DISORDER OF KIDNEY AND URETER, UNSPECIFIED: ICD-10-CM

## 2019-10-19 DIAGNOSIS — J01.00 ACUTE MAXILLARY SINUSITIS, UNSPECIFIED: ICD-10-CM

## 2019-10-19 DIAGNOSIS — E11.9 TYPE 2 DIABETES MELLITUS WITHOUT COMPLICATIONS: ICD-10-CM

## 2019-10-19 DIAGNOSIS — R22.0 LOCALIZED SWELLING, MASS AND LUMP, HEAD: ICD-10-CM

## 2019-10-19 DIAGNOSIS — L03.211 CELLULITIS OF FACE: ICD-10-CM

## 2019-10-19 DIAGNOSIS — Z00.00 ENCOUNTER FOR GENERAL ADULT MEDICAL EXAMINATION WITHOUT ABNORMAL FINDINGS: ICD-10-CM

## 2019-10-19 DIAGNOSIS — I10 ESSENTIAL (PRIMARY) HYPERTENSION: ICD-10-CM

## 2019-10-19 DIAGNOSIS — D49.0 NEOPLASM OF UNSPECIFIED BEHAVIOR OF DIGESTIVE SYSTEM: Chronic | ICD-10-CM

## 2019-10-19 LAB
ANION GAP SERPL CALC-SCNC: 13 MMO/L — SIGNIFICANT CHANGE UP (ref 7–14)
APPEARANCE UR: CLEAR — SIGNIFICANT CHANGE UP
BASE EXCESS BLDV CALC-SCNC: -0.6 MMOL/L — SIGNIFICANT CHANGE UP
BASOPHILS # BLD AUTO: 0.02 K/UL — SIGNIFICANT CHANGE UP (ref 0–0.2)
BASOPHILS NFR BLD AUTO: 0.1 % — SIGNIFICANT CHANGE UP (ref 0–2)
BILIRUB UR-MCNC: NEGATIVE — SIGNIFICANT CHANGE UP
BLOOD GAS VENOUS - CREATININE: 0.99 MG/DL — SIGNIFICANT CHANGE UP (ref 0.5–1.3)
BLOOD GAS VENOUS - FIO2: 21 — SIGNIFICANT CHANGE UP
BLOOD UR QL VISUAL: NEGATIVE — SIGNIFICANT CHANGE UP
BUN SERPL-MCNC: 23 MG/DL — SIGNIFICANT CHANGE UP (ref 7–23)
CALCIUM SERPL-MCNC: 8.5 MG/DL — SIGNIFICANT CHANGE UP (ref 8.4–10.5)
CHLORIDE BLDV-SCNC: 98 MMOL/L — SIGNIFICANT CHANGE UP (ref 96–108)
CHLORIDE SERPL-SCNC: 97 MMOL/L — LOW (ref 98–107)
CO2 SERPL-SCNC: 21 MMOL/L — LOW (ref 22–31)
COLOR SPEC: SIGNIFICANT CHANGE UP
CORTIS SERPL-MCNC: 15.6 UG/DL — SIGNIFICANT CHANGE UP (ref 2.7–18.4)
CREAT SERPL-MCNC: 0.9 MG/DL — SIGNIFICANT CHANGE UP (ref 0.5–1.3)
EOSINOPHIL # BLD AUTO: 0.02 K/UL — SIGNIFICANT CHANGE UP (ref 0–0.5)
EOSINOPHIL NFR BLD AUTO: 0.1 % — SIGNIFICANT CHANGE UP (ref 0–6)
GAS PNL BLDV: 127 MMOL/L — LOW (ref 136–146)
GLUCOSE BLDV-MCNC: 207 MG/DL — HIGH (ref 70–99)
GLUCOSE SERPL-MCNC: 158 MG/DL — HIGH (ref 70–99)
GLUCOSE UR-MCNC: NEGATIVE — SIGNIFICANT CHANGE UP
GRAM STN FLD: SIGNIFICANT CHANGE UP
HCO3 BLDV-SCNC: 22 MMOL/L — SIGNIFICANT CHANGE UP (ref 20–27)
HCT VFR BLD CALC: 39.9 % — SIGNIFICANT CHANGE UP (ref 39–50)
HCT VFR BLDV CALC: 43 % — SIGNIFICANT CHANGE UP (ref 39–51)
HGB BLD-MCNC: 13.8 G/DL — SIGNIFICANT CHANGE UP (ref 13–17)
HGB BLDV-MCNC: 14 G/DL — SIGNIFICANT CHANGE UP (ref 13–17)
IMM GRANULOCYTES NFR BLD AUTO: 0.8 % — SIGNIFICANT CHANGE UP (ref 0–1.5)
KETONES UR-MCNC: NEGATIVE — SIGNIFICANT CHANGE UP
LACTATE BLDV-MCNC: 1.7 MMOL/L — SIGNIFICANT CHANGE UP (ref 0.5–2)
LEUKOCYTE ESTERASE UR-ACNC: NEGATIVE — SIGNIFICANT CHANGE UP
LYMPHOCYTES # BLD AUTO: 0.57 K/UL — LOW (ref 1–3.3)
LYMPHOCYTES # BLD AUTO: 2.6 % — LOW (ref 13–44)
MAGNESIUM SERPL-MCNC: 2 MG/DL — SIGNIFICANT CHANGE UP (ref 1.6–2.6)
MCHC RBC-ENTMCNC: 29.7 PG — SIGNIFICANT CHANGE UP (ref 27–34)
MCHC RBC-ENTMCNC: 34.6 % — SIGNIFICANT CHANGE UP (ref 32–36)
MCV RBC AUTO: 85.8 FL — SIGNIFICANT CHANGE UP (ref 80–100)
MONOCYTES # BLD AUTO: 1.67 K/UL — HIGH (ref 0–0.9)
MONOCYTES NFR BLD AUTO: 7.7 % — SIGNIFICANT CHANGE UP (ref 2–14)
NEUTROPHILS # BLD AUTO: 19.33 K/UL — HIGH (ref 1.8–7.4)
NEUTROPHILS NFR BLD AUTO: 88.7 % — HIGH (ref 43–77)
NITRITE UR-MCNC: NEGATIVE — SIGNIFICANT CHANGE UP
NRBC # FLD: 0 K/UL — SIGNIFICANT CHANGE UP (ref 0–0)
PCO2 BLDV: 47 MMHG — SIGNIFICANT CHANGE UP (ref 41–51)
PH BLDV: 7.33 PH — SIGNIFICANT CHANGE UP (ref 7.32–7.43)
PH UR: 6.5 — SIGNIFICANT CHANGE UP (ref 5–8)
PHOSPHATE SERPL-MCNC: 2.6 MG/DL — SIGNIFICANT CHANGE UP (ref 2.5–4.5)
PLATELET # BLD AUTO: 237 K/UL — SIGNIFICANT CHANGE UP (ref 150–400)
PMV BLD: 10.3 FL — SIGNIFICANT CHANGE UP (ref 7–13)
PO2 BLDV: < 24 MMHG — LOW (ref 35–40)
POTASSIUM BLDV-SCNC: 3.6 MMOL/L — SIGNIFICANT CHANGE UP (ref 3.4–4.5)
POTASSIUM SERPL-MCNC: 3.9 MMOL/L — SIGNIFICANT CHANGE UP (ref 3.5–5.3)
POTASSIUM SERPL-SCNC: 3.9 MMOL/L — SIGNIFICANT CHANGE UP (ref 3.5–5.3)
PROT UR-MCNC: NEGATIVE — SIGNIFICANT CHANGE UP
RBC # BLD: 4.65 M/UL — SIGNIFICANT CHANGE UP (ref 4.2–5.8)
RBC # FLD: 12.9 % — SIGNIFICANT CHANGE UP (ref 10.3–14.5)
SAO2 % BLDV: 38.5 % — LOW (ref 60–85)
SODIUM SERPL-SCNC: 131 MMOL/L — LOW (ref 135–145)
SP GR SPEC: 1.03 — SIGNIFICANT CHANGE UP (ref 1–1.04)
SPECIMEN SOURCE: SIGNIFICANT CHANGE UP
TROPONIN T, HIGH SENSITIVITY: 16 NG/L — SIGNIFICANT CHANGE UP (ref ?–14)
UROBILINOGEN FLD QL: NORMAL — SIGNIFICANT CHANGE UP
WBC # BLD: 21.78 K/UL — HIGH (ref 3.8–10.5)
WBC # FLD AUTO: 21.78 K/UL — HIGH (ref 3.8–10.5)

## 2019-10-19 PROCEDURE — 12345: CPT | Mod: NC,GC

## 2019-10-19 PROCEDURE — 71046 X-RAY EXAM CHEST 2 VIEWS: CPT | Mod: 26

## 2019-10-19 PROCEDURE — 99223 1ST HOSP IP/OBS HIGH 75: CPT | Mod: GC

## 2019-10-19 RX ORDER — DEXTROSE 50 % IN WATER 50 %
25 SYRINGE (ML) INTRAVENOUS ONCE
Refills: 0 | Status: DISCONTINUED | OUTPATIENT
Start: 2019-10-19 | End: 2019-10-24

## 2019-10-19 RX ORDER — INSULIN LISPRO 100/ML
VIAL (ML) SUBCUTANEOUS
Refills: 0 | Status: DISCONTINUED | OUTPATIENT
Start: 2019-10-19 | End: 2019-10-24

## 2019-10-19 RX ORDER — PIPERACILLIN AND TAZOBACTAM 4; .5 G/20ML; G/20ML
3.38 INJECTION, POWDER, LYOPHILIZED, FOR SOLUTION INTRAVENOUS ONCE
Refills: 0 | Status: COMPLETED | OUTPATIENT
Start: 2019-10-19 | End: 2019-10-19

## 2019-10-19 RX ORDER — LACTOBACILLUS ACIDOPHILUS 100MM CELL
1 CAPSULE ORAL DAILY
Refills: 0 | Status: DISCONTINUED | OUTPATIENT
Start: 2019-10-19 | End: 2019-10-24

## 2019-10-19 RX ORDER — DEXTROSE 50 % IN WATER 50 %
15 SYRINGE (ML) INTRAVENOUS ONCE
Refills: 0 | Status: DISCONTINUED | OUTPATIENT
Start: 2019-10-19 | End: 2019-10-24

## 2019-10-19 RX ORDER — SODIUM CHLORIDE 9 MG/ML
1000 INJECTION, SOLUTION INTRAVENOUS
Refills: 0 | Status: DISCONTINUED | OUTPATIENT
Start: 2019-10-19 | End: 2019-10-24

## 2019-10-19 RX ORDER — VANCOMYCIN HCL 1 G
VIAL (EA) INTRAVENOUS
Refills: 0 | Status: DISCONTINUED | OUTPATIENT
Start: 2019-10-19 | End: 2019-10-22

## 2019-10-19 RX ORDER — PIPERACILLIN AND TAZOBACTAM 4; .5 G/20ML; G/20ML
3.38 INJECTION, POWDER, LYOPHILIZED, FOR SOLUTION INTRAVENOUS EVERY 8 HOURS
Refills: 0 | Status: DISCONTINUED | OUTPATIENT
Start: 2019-10-19 | End: 2019-10-22

## 2019-10-19 RX ORDER — DEXTROSE 50 % IN WATER 50 %
12.5 SYRINGE (ML) INTRAVENOUS ONCE
Refills: 0 | Status: DISCONTINUED | OUTPATIENT
Start: 2019-10-19 | End: 2019-10-24

## 2019-10-19 RX ORDER — VANCOMYCIN HCL 1 G
1000 VIAL (EA) INTRAVENOUS EVERY 12 HOURS
Refills: 0 | Status: DISCONTINUED | OUTPATIENT
Start: 2019-10-20 | End: 2019-10-22

## 2019-10-19 RX ORDER — ENOXAPARIN SODIUM 100 MG/ML
40 INJECTION SUBCUTANEOUS DAILY
Refills: 0 | Status: DISCONTINUED | OUTPATIENT
Start: 2019-10-19 | End: 2019-10-24

## 2019-10-19 RX ORDER — INSULIN GLARGINE 100 [IU]/ML
4 INJECTION, SOLUTION SUBCUTANEOUS AT BEDTIME
Refills: 0 | Status: DISCONTINUED | OUTPATIENT
Start: 2019-10-19 | End: 2019-10-20

## 2019-10-19 RX ORDER — VANCOMYCIN HCL 1 G
1000 VIAL (EA) INTRAVENOUS ONCE
Refills: 0 | Status: COMPLETED | OUTPATIENT
Start: 2019-10-19 | End: 2019-10-19

## 2019-10-19 RX ORDER — INSULIN LISPRO 100/ML
VIAL (ML) SUBCUTANEOUS AT BEDTIME
Refills: 0 | Status: DISCONTINUED | OUTPATIENT
Start: 2019-10-19 | End: 2019-10-24

## 2019-10-19 RX ORDER — GLUCAGON INJECTION, SOLUTION 0.5 MG/.1ML
1 INJECTION, SOLUTION SUBCUTANEOUS ONCE
Refills: 0 | Status: DISCONTINUED | OUTPATIENT
Start: 2019-10-19 | End: 2019-10-24

## 2019-10-19 RX ORDER — SODIUM CHLORIDE 9 MG/ML
1000 INJECTION INTRAMUSCULAR; INTRAVENOUS; SUBCUTANEOUS
Refills: 0 | Status: DISCONTINUED | OUTPATIENT
Start: 2019-10-19 | End: 2019-10-19

## 2019-10-19 RX ORDER — DEXAMETHASONE 0.5 MG/5ML
1 ELIXIR ORAL
Qty: 0 | Refills: 0 | DISCHARGE

## 2019-10-19 RX ORDER — AMLODIPINE BESYLATE 2.5 MG/1
5 TABLET ORAL DAILY
Refills: 0 | Status: DISCONTINUED | OUTPATIENT
Start: 2019-10-19 | End: 2019-10-24

## 2019-10-19 RX ORDER — ASPIRIN/CALCIUM CARB/MAGNESIUM 324 MG
81 TABLET ORAL DAILY
Refills: 0 | Status: DISCONTINUED | OUTPATIENT
Start: 2019-10-19 | End: 2019-10-24

## 2019-10-19 RX ADMIN — INSULIN GLARGINE 4 UNIT(S): 100 INJECTION, SOLUTION SUBCUTANEOUS at 22:44

## 2019-10-19 RX ADMIN — Medication 100 MILLIGRAM(S): at 01:52

## 2019-10-19 RX ADMIN — Medication 100 MILLIGRAM(S): at 13:23

## 2019-10-19 RX ADMIN — Medication 2: at 17:45

## 2019-10-19 RX ADMIN — Medication 1 TABLET(S): at 13:23

## 2019-10-19 RX ADMIN — Medication 250 MILLIGRAM(S): at 16:33

## 2019-10-19 RX ADMIN — Medication 81 MILLIGRAM(S): at 13:23

## 2019-10-19 RX ADMIN — PIPERACILLIN AND TAZOBACTAM 25 GRAM(S): 4; .5 INJECTION, POWDER, LYOPHILIZED, FOR SOLUTION INTRAVENOUS at 21:56

## 2019-10-19 RX ADMIN — ENOXAPARIN SODIUM 40 MILLIGRAM(S): 100 INJECTION SUBCUTANEOUS at 13:23

## 2019-10-19 RX ADMIN — PIPERACILLIN AND TAZOBACTAM 200 GRAM(S): 4; .5 INJECTION, POWDER, LYOPHILIZED, FOR SOLUTION INTRAVENOUS at 13:57

## 2019-10-19 RX ADMIN — Medication 100 MILLIGRAM(S): at 06:20

## 2019-10-19 RX ADMIN — SODIUM CHLORIDE 75 MILLILITER(S): 9 INJECTION INTRAMUSCULAR; INTRAVENOUS; SUBCUTANEOUS at 06:12

## 2019-10-19 RX ADMIN — AMLODIPINE BESYLATE 5 MILLIGRAM(S): 2.5 TABLET ORAL at 06:32

## 2019-10-19 NOTE — PROGRESS NOTE ADULT - PROBLEM SELECTOR PLAN 9
-BP acceptable now  -continue amlodipine, hold ACE in setting of possible angioedema -BP acceptable now    Plan:   -continue amlodipine, hold ACE in setting of possible angioedema DVT ppx: Lovenox qd  Diet: Halal  Dispo: PT eval pending

## 2019-10-19 NOTE — H&P ADULT - NSHPPHYSICALEXAM_GEN_ALL_CORE
PHYSICAL EXAM:  GENERAL: No acute distress, well-developed  HEAD:  Atraumatic, Normocephalic  EYES: EOMI, PERRLA, conjunctiva and sclera clear  NECK: Supple, no lymphadenopathy, no JVD  CHEST/LUNG: CTAB; No wheezes, rales, or rhonchi  HEART: Regular rate and rhythm; No murmurs, rubs, or gallops  ABDOMEN: Soft, non-tender, non-distended; normal bowel sounds, no organomegaly  EXTREMITIES:  2+ peripheral pulses b/l, No clubbing, cyanosis, or edema  NEUROLOGY: A&O x 3, no focal deficits  SKIN: No rashes or lesions PHYSICAL EXAM:  GENERAL: No acute distress, appears slightly uncomfortable  HEAD:  Atraumatic, Normocephalic  EYES: EOMI, PERRLA, conjunctiva and sclera clear; bilateral below the eye painless edema   NECK: Supple, no lymphadenopathy, no JVD  MOUTH: right sided facial droop s/p surgery, not new  THROAT: no throat pain or swelling   CHEST/LUNG: CTABL; No wheezes, rales, or rhonchi  HEART: Regular rate and rhythm; No murmurs, rubs, or gallops  ABDOMEN: Soft, tender in suprapubic region, non-distended; normal bowel sounds, no organomegaly  EXTREMITIES:  2+ peripheral pulses b/l, No clubbing, cyanosis, or edema  NEUROLOGY: A&O x 3, no focal deficits; gross muscle weakness however bilateral  SKIN: gold crusted, painful lesions present on nose; no other lesions seen Vital Signs Last 24 Hrs  T(C): 37.6 (19 Oct 2019 03:15), Max: 37.9 (18 Oct 2019 18:20)  T(F): 99.7 (19 Oct 2019 03:15), Max: 100.2 (18 Oct 2019 18:20)  HR: 84 (19 Oct 2019 03:15) (68 - 84)  BP: 153/71 (19 Oct 2019 03:15) (110/62 - 153/71)  BP(mean): --  RR: 18 (19 Oct 2019 03:15) (16 - 18)  SpO2: 99% (19 Oct 2019 03:15) (99% - 100%)    PHYSICAL EXAM:  GENERAL: No acute distress, appears slightly uncomfortable  HEAD:  Atraumatic, Normocephalic  EYES: EOMI, PERRLA, conjunctiva and sclera clear; bilateral below the eye painless edema   NECK: Supple, no lymphadenopathy, no JVD  MOUTH: right sided facial droop s/p surgery, not new  THROAT: no throat pain or swelling   CHEST/LUNG: CTABL; No wheezes, rales, or rhonchi  HEART: Regular rate and rhythm; No murmurs, rubs, or gallops  ABDOMEN: Soft, tender in suprapubic region, non-distended; normal bowel sounds, no organomegaly  EXTREMITIES:  2+ peripheral pulses b/l, No clubbing, cyanosis, or edema  NEUROLOGY: A&O x 3, no focal deficits; gross muscle weakness however bilateral  SKIN: gold crusted, painful lesions present on nose; no other lesions seen Vital Signs Last 24 Hrs  T(C): 37.6 (19 Oct 2019 03:15), Max: 37.9 (18 Oct 2019 18:20)  T(F): 99.7 (19 Oct 2019 03:15), Max: 100.2 (18 Oct 2019 18:20)  HR: 84 (19 Oct 2019 03:15) (68 - 84)  BP: 153/71 (19 Oct 2019 03:15) (110/62 - 153/71)  BP(mean): --  RR: 18 (19 Oct 2019 03:15) (16 - 18)  SpO2: 99% (19 Oct 2019 03:15) (99% - 100%)    PHYSICAL EXAM:  GENERAL: No acute distress, appears slightly uncomfortable  EYES: EOMI, PERRL, conjunctiva and sclera clear; bilateral below the eye painless edema   NECK: Supple, no lymphadenopathy, no JVD  MOUTH: right sided facial droop s/p surgery, not new  THROAT: no throat pain or swelling   CHEST/LUNG: CTABL; No wheezes, rales, or rhonchi  HEART: Regular rate and rhythm; No murmurs, rubs, or gallops  ABDOMEN: Soft, tender in suprapubic region, non-distended; normal bowel sounds, no organomegaly  EXTREMITIES:  2+ peripheral pulses b/l, No clubbing, cyanosis, or edema  NEUROLOGY: A&O x 3, no focal deficits; gross muscle weakness however bilateral  SKIN: gold crusted, painful lesions present on nose; no other lesions seen

## 2019-10-19 NOTE — H&P ADULT - ATTENDING COMMENTS
Patient seen and examined on 10/19/19, case discussed with Dr. Mary Larson, agree with assessment and plan as above.

## 2019-10-19 NOTE — H&P ADULT - PROBLEM SELECTOR PLAN 1
-patient with acute onset painless swelling and dyspnea similar to anaphylactic reaction; possibly hypersensitivity to steroids as patient recently started on steroids vs. angioedema 2/2 ACE use as this may occur years into treatment  -possibly 2/2 to tumor advancement/presence however acute nature seems unlikely  -suspicion for cellulitis is low as it is clearly edema, bilaterally present, painless and not present on anterior lid   -CT chest without SVC syndrome  -will hold steroids (no need to taper, only 10 days) and ACEi for now -patient with acute onset painless swelling and dyspnea similar to anaphylactic reaction; possibly hypersensitivity to steroids as patient recently started on steroids vs. angioedema 2/2 ACE use as this may occur years into treatment  -possibly vs. tumor/abscess related vs. 2/2 to tumor advancement/presence however acute nature seems unlikely  -suspicion for cellulitis is low as it is clearly edema, bilaterally present, painless and not present on anterior lid   -CT chest without SVC syndrome  -CT maxillofacial w/w/o IV cont to r/o abscess/tumor as etiology of symptoms   -will hold steroids (no need to taper, only 10 days) and ACEi for now -Concern for facial cellulitis given impetigo of nose now with swelling of surrounding facial tissue, will c/w clindamycin for now, will check a CT maxillofacial to further assess for cellulitis spread  -possibly tumor/abscess related vs. 2/2 to tumor advancement/presence however given acute nature seems unlikely  -patient with acute onset painless swelling and dyspnea possibly hypersensitivity to steroids as patient recently started on steroids vs. angioedema 2/2 ACE use as this may occur years into treatment but low consideration as patient is satting well on RA, does not have any stridor, does not have any difficulty tolerating his secretions, swelling does not involve the lips/tongue/uvula, and patient is without complaints of SOB currently  -CT chest without SVC syndrome  -will hold steroids (no need to taper, only 10 days, finished course on 10/15) and ACEi for now

## 2019-10-19 NOTE — H&P ADULT - HISTORY OF PRESENT ILLNESS
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 Camille Snell) (pcp  Dr. Colette Hair) and CKD presents with face swelling for 1 day and weakness for 1 week. Reports having symmetrical face swelling worse under the eyelids, non-painful with no fevers. Reports SOB last night while laying supine denied chest pain, palpitations, lightheadedness, N, V or blurry vision.  He is currently asymptomatic. Denies swelling of his abdomen or lower extremities. Denies neck pressure, tongue swelling, neck swelling and has normal neck ROM. Patient reports weakness for 1 week; has been able to ambulate and move all extremities, as normal PO [place]nake. 70 y/o Georgian speaking M with hx of DM, HTN, HLD, R throat tumor treated with surgery (April 2019) and radiation (last rad August 16), CAD s/p pacemaker and stent 8 years ago, and CKD presents with face swelling for 1 day and weakness for 1 week, prior to which he was in his usual state of health. History obtained from patient and family members (daughter and wife) at bedside using . States that 10 days ago, he began feeling very weak. Five days ago, the patient developed a painful rash on his nose, which began to crust. The night prior to admission, the patient had extreme pain of the nose, with swelling below the eyes, with difficulty breathing. He also endorsed severe headache and abdominal cramps, as well as nausea without vomiting. Ten days ago, he was prescribed Dexamethasone 2mg daily for ear discomfort, as well as a multivitamin. He denies taking Dexamethasone before that. He denies fevers, chest pain, neck or throat pain, dysphagia, diarrhea/constipation, urinary symptoms, numbness/weakness of the arms, legs or neck, or neck swelling. He denies any sick contacts or recent travel. At this time, patient still has nose pain and weakness, however his other symptoms have improved.     In the ED, the patient's VS: T 99.7, HR 84, /71, RR 18, and SpO2 99  Labs significant for leukocytosis to 19.26 with 16.53 Neutrophils, hyponatremia to 126, and Lactate 2.1 --> 1.7  Given Clindamycin in ED 68 y/o Ukrainian speaking M with hx of DM, HTN, HLD, L parotid tumor treated with surgery (April 2019) and radiation (last rad August 16), CAD s/p pacemaker and stent 8 years ago, and CKD presents with face swelling for 1 day and weakness for 1 week, prior to which he was in his usual state of health. History obtained from patient and family members (daughter and wife) at bedside using . States that 10 days ago, he began feeling very weak. Five days ago, the patient developed a painful rash on his nose, which began to crust. The night prior to admission, the patient had extreme pain of the nose, with swelling below the eyes, with difficulty breathing. He also endorsed severe headache and abdominal cramps, as well as nausea without vomiting. Ten days ago, he was prescribed Dexamethasone 2mg daily for ear discomfort, as well as a multivitamin. He denies taking Dexamethasone before that. He denies fevers, chest pain, neck or throat pain, dysphagia, diarrhea/constipation, urinary symptoms, numbness/weakness of the arms, legs or neck, or neck swelling. He denies any sick contacts or recent travel. At this time, patient still has nose pain and weakness, however his other symptoms have improved.     In the ED, the patient's VS: T 99.7, HR 84, /71, RR 18, and SpO2 99  Labs significant for leukocytosis to 19.26 with 16.53 Neutrophils, hyponatremia to 126, and Lactate 2.1 --> 1.7  Given Clindamycin in ED 70 y/o Maori speaking M with hx of DM, HTN, HLD, L parotid tumor treated with surgery (April 2019) and radiation (last rad August 16), CAD s/p pacemaker and stent 8 years ago, and CKD presents with face swelling for 1 day and weakness for 1 week, prior to which he was in his usual state of health. History obtained from patient and family members (daughter and wife) at bedside using . States that 10 days ago, he began feeling very weak. Five days ago, the patient developed a painful rash on his nose, which began to crust. The night prior to admission, the patient had extreme pain of the nose, with swelling below the eyes, with difficulty breathing. He also endorsed severe headache and abdominal cramps, as well as nausea without vomiting. Ten days ago, he was prescribed Dexamethasone 2mg twice daily for ear discomfort (from 10/6 - 10/15, hasnt been on it since), as well as a multivitamin. He denies taking Dexamethasone before that. He denies fevers, chest pain, neck or throat pain, dysphagia, diarrhea/constipation, urinary symptoms, numbness/weakness of the arms, legs or neck, or neck swelling. He denies any sick contacts or recent travel. At this time, patient still has nose pain and weakness, however his other symptoms have improved.     In the ED, the patient's VS: T 99.7, HR 84, /71, RR 18, and SpO2 99  Labs significant for leukocytosis to 19.26 with 16.53 Neutrophils, hyponatremia to 126, and Lactate 2.1 --> 1.7  Given Clindamycin in ED 70 y/o Occitan speaking M with hx of DM, HTN, HLD, L parotid tumor treated with surgery (April 2019) and radiation (last rad August 16), CAD s/p pacemaker and stent 8 years ago, and CKD presents with face swelling for 1 day and weakness for 1 week, prior to which he was in his usual state of health. History obtained from patient and family members (daughter and wife) at bedside using . States that 10 days ago, he began feeling very weak. Five days ago, the patient developed a painful rash on his nose, which began to crust. The night prior to admission, the patient had extreme pain of the nose, with swelling below the eyes, with difficulty breathing (states its more due to nasal congestion). He also endorsed severe headache and abdominal cramps, as well as nausea without vomiting. Ten days ago, he was prescribed Dexamethasone 2mg twice daily for ear discomfort (from 10/6 - 10/15, hasnt been on it since), as well as a multivitamin. He denies taking Dexamethasone before that. He denies fevers, chest pain, neck or throat pain, dysphagia, diarrhea/constipation, urinary symptoms, numbness/weakness of the arms, legs or neck, or neck swelling. He denies any sick contacts or recent travel. At this time, patient still has nose pain and weakness, however his other symptoms have improved.     In the ED, the patient's VS: T 99.7, HR 84, /71, RR 18, and SpO2 99  Labs significant for leukocytosis to 19.26 with 16.53 Neutrophils, hyponatremia to 126, and Lactate 2.1 --> 1.7  Given Clindamycin in ED

## 2019-10-19 NOTE — PROGRESS NOTE ADULT - PROBLEM SELECTOR PLAN 1
-Concern for facial cellulitis given impetigo of nose now with swelling of surrounding facial tissue, will c/w clindamycin for now, will check a CT maxillofacial to further assess for cellulitis spread  -possibly tumor/abscess related vs. 2/2 to tumor advancement/presence however given acute nature seems unlikely  -patient with acute onset painless swelling and dyspnea possibly hypersensitivity to steroids as patient recently started on steroids vs. angioedema 2/2 ACE use as this may occur years into treatment but low consideration as patient is satting well on RA, does not have any stridor, does not have any difficulty tolerating his secretions, swelling does not involve the lips/tongue/uvula, and patient is without complaints of SOB currently  -CT chest without SVC syndrome  -will hold steroids (no need to taper, only 10 days, finished course on 10/15) and ACEi for now - Likely exacerbation of sinusitis in the setting of recent steroid (dexamethasone initiated 10 dyas ago)   - unlikely to be angioedema, considering the lack of fever/nausea/vomiting/wheezes/respiratory difficulties or other systemic signs/symptoms, also patient has been on ACEi for a long time.   - CT chest ruled out SVC syndrome     Plan:   - s/p ENT consult, appreciate recs   - f/u CT maxillofacial  - continue antibiotics, currently on clindamycin. consider broadening to vancomycin + Zosyn - now meets sepsis criteria with HR>90 and leukocytosis (although leukocytosis may be from recent steroid use) 2/2 cellulitis vs sinusitis? abx broadened to vanc and zosyn   -blood and wound cultures sent   - unlikely to be angioedema, considering the lack of fever/nausea/vomiting/wheezes/respiratory difficulties or other systemic signs/symptoms, also patient has been on ACEi for a long time.   - CT chest ruled out SVC syndrome   - s/p ENT consult, appreciate recs   - f/u CT maxillofacial  -  ID eval on 10/20 pending CT results

## 2019-10-19 NOTE — PROGRESS NOTE ADULT - PROBLEM SELECTOR PLAN 5
-likely in setting of steroid use/impetigo infection   -can monitor for improvement off steroids and on antibiotics -likely in setting of steroid use/impetigo infection     Plan:   -Monitor for improvement off steroids and on antibiotics -generalized weakness that began with initiation of steroids 10 days ago   - hold steroids and statin to see if there is improvement in symptoms   -check CK level   -PT eval

## 2019-10-19 NOTE — H&P ADULT - PROBLEM SELECTOR PLAN 2
-likely 2/2 to hypersensitivity to steroids vs. angioedema as described above  -continue to monitor VS and breathing status   -consider ENT consultation to assess upper airway to ensure no tumor compromise present -continue to monitor VS and breathing status   -consider ENT consultation in AM (as per primary team) to assess upper airway to ensure no tumor compromise present -Currently satting well on RA  -continue to monitor VS and breathing status   -consider ENT consultation in AM (as per primary team) to assess upper airway to ensure no tumor compromise present -Currently satting well on RA, states that his dyspnea feels more like nasal obstruction  -continue to monitor VS and breathing status   -consider ENT consultation in AM (as per primary team) to assess upper airway to ensure no tumor compromise present -Currently satting well on RA, states that his dyspnea feels more like nasal congestion  -continue to monitor VS and breathing status   -consider ENT consultation in AM (as per primary team) to assess upper airway to ensure no tumor compromise present

## 2019-10-19 NOTE — H&P ADULT - NSHPREVIEWOFSYSTEMS_GEN_ALL_CORE
REVIEW OF SYSTEMS:    CONSTITUTIONAL: No weakness, fevers or chills  EYES/ENT: No visual changes;  No vertigo or throat pain   NECK: No pain or stiffness  RESPIRATORY: No cough, wheezing, hemoptysis; No shortness of breath  CARDIOVASCULAR: No chest pain or palpitations  GASTROINTESTINAL: No abdominal or epigastric pain. No nausea, vomiting, or hematemesis; No diarrhea or constipation. No melena or hematochezia.  GENITOURINARY: No dysuria, frequency or hematuria  NEUROLOGICAL: No numbness or weakness  SKIN: No itching, rashes REVIEW OF SYSTEMS:    CONSTITUTIONAL: (+) weakness; (-) fevers or chills  EYES/ENT: No visual changes;  No vertigo or throat pain   NECK: No pain or stiffness  RESPIRATORY: No cough, wheezing, hemoptysis (+) SOB  CARDIOVASCULAR: No chest pain or palpitations  GASTROINTESTINAL: (+) abdominal pain and nausea;  (-) vomiting, or hematemesis; No diarrhea or constipation. No melena or hematochezia.  GENITOURINARY: No dysuria, frequency or hematuria  SKIN: nose rash with pain REVIEW OF SYSTEMS:    CONSTITUTIONAL: (+) weakness; (-) fevers or chills  EYES: No visual changes or eye discharge  ENT: No rhinorrhea or sore throat  NECK: No pain or stiffness  RESPIRATORY: No cough, wheezing, hemoptysis (+) SOB  CARDIOVASCULAR: No chest pain or palpitations; No lower extremity edema  GASTROINTESTINAL: (+) abdominal pain and nausea;  (-) vomiting, or hematemesis; No diarrhea or constipation. No melena or hematochezia.  BACK: No back pain  GENITOURINARY: No dysuria, frequency or hematuria  NEUROLOGICAL: No numbness or weakness  SKIN: nose rash with pain REVIEW OF SYSTEMS:    CONSTITUTIONAL: (+) weakness; (-) fevers or chills  EYES: No visual changes or eye discharge  ENT: +nasal congestion, No rhinorrhea or sore throat  NECK: No pain or stiffness  RESPIRATORY: No cough, wheezing, hemoptysis (+) SOB  CARDIOVASCULAR: No chest pain or palpitations; No lower extremity edema  GASTROINTESTINAL: (+) abdominal pain and nausea;  (-) vomiting, or hematemesis; No diarrhea or constipation. No melena or hematochezia.  BACK: No back pain  GENITOURINARY: No dysuria, frequency or hematuria  NEUROLOGICAL: No numbness or weakness  SKIN: nose rash with pain

## 2019-10-19 NOTE — H&P ADULT - PROBLEM SELECTOR PLAN 8
-Cr currently at baseline  -continue to monitor on Lantus in the AM  can continue with ISS today and monitor requirements based on PO intake to set basal dose   FS before meals and at bedtime on Lantus in the AM  can continue with ISS today and will decrease his home lantus dosing significantly to 4U given his decreased PO intake, monitor requirements based on PO intake to adjust basal dose   FS before meals and at bedtime

## 2019-10-19 NOTE — PROGRESS NOTE ADULT - PROBLEM SELECTOR PLAN 6
-s/p resection and radiation   - Consider ENT consultation in AM to r/o tumor involvement as etiology of symptoms -s/p resection and radiation     Plan:   - s/p ENT consultation, appreciate recs improved, Na 126 --> 131.   - suspect hypotonic, hypovolemic in setting dec PO intake  - continue to trend Na

## 2019-10-19 NOTE — PROGRESS NOTE ADULT - ATTENDING COMMENTS
I was physically present for the key portions of the evaluation and management (E/M) service provided.  I agree with the above history, physical, and plan which I have reviewed and edited where appropriate.     Plan discussed with resident.

## 2019-10-19 NOTE — CONSULT NOTE ADULT - ASSESSMENT
A/P 70 y/o Romansh speaking M with hx of DM, HTN, HLD, L parotid tumor s/p L parotid, SND 2-4, cervicofacial flap 4/17, CAD s/p pacemaker and stent 8 years ago, and CKD now with infection of nose/face, concern for nasovestibular abscess. No obvious findings on exam to suggest an underlying abscess.   -CT max face with contrast to evaluate for an underlying abscess  -consider broadening abx to vanc/zosyn given worsening infection in setting of recent radiation  -low suspicion for recurrent tumor  -facial swelling likely related to infection, low suspicion for allergic reaction/angioedema  -will f/u imaging  -will discuss with attending and update with recs

## 2019-10-19 NOTE — PROGRESS NOTE ADULT - SUBJECTIVE AND OBJECTIVE BOX
Manuelamayra Blum   PGY-1 Resident Physician   Pager [phone]/ [phone] from 7am to 7pm, after 7pm page night float     Patient is a 69y old  Male who presents with a chief complaint of facial edema and pain (19 Oct 2019 03:22)      SUBJECTIVE / OVERNIGHT EVENTS:    MEDICATIONS  (STANDING):  amLODIPine   Tablet 5 milliGRAM(s) Oral daily  aspirin enteric coated 81 milliGRAM(s) Oral daily  clindamycin IVPB 600 milliGRAM(s) IV Intermittent every 8 hours  dextrose 5%. 1000 milliLiter(s) (50 mL/Hr) IV Continuous <Continuous>  dextrose 50% Injectable 12.5 Gram(s) IV Push once  dextrose 50% Injectable 25 Gram(s) IV Push once  dextrose 50% Injectable 25 Gram(s) IV Push once  enoxaparin Injectable 40 milliGRAM(s) SubCutaneous daily  insulin glargine Injectable (LANTUS) 4 Unit(s) SubCutaneous at bedtime  insulin lispro (HumaLOG) corrective regimen sliding scale   SubCutaneous three times a day before meals  insulin lispro (HumaLOG) corrective regimen sliding scale   SubCutaneous at bedtime  lactobacillus acidophilus 1 Tablet(s) Oral daily  sodium chloride 0.9%. 1000 milliLiter(s) (75 mL/Hr) IV Continuous <Continuous>    MEDICATIONS  (PRN):  dextrose 40% Gel 15 Gram(s) Oral once PRN Blood Glucose LESS THAN 70 milliGRAM(s)/deciliter  glucagon  Injectable 1 milliGRAM(s) IntraMuscular once PRN Glucose LESS THAN 70 milligrams/deciliter    Allergies    No Known Allergies    Intolerances        Vital Signs Last 24 Hrs  T(C): 37.9 (19 Oct 2019 06:08), Max: 37.9 (18 Oct 2019 18:20)  T(F): 100.3 (19 Oct 2019 06:08), Max: 100.3 (19 Oct 2019 06:08)  HR: 88 (19 Oct 2019 06:08) (68 - 88)  BP: 149/71 (19 Oct 2019 06:08) (110/62 - 153/71)  BP(mean): --  RR: 18 (19 Oct 2019 06:08) (16 - 18)  SpO2: 100% (19 Oct 2019 06:08) (99% - 100%)  Daily Height in cm: 157.48 (19 Oct 2019 03:15)    Daily   CAPILLARY BLOOD GLUCOSE      POCT Blood Glucose.: 87 mg/dL (19 Oct 2019 08:22)  POCT Blood Glucose.: 166 mg/dL (18 Oct 2019 18:39)    I&O's Summary      PHYSICAL EXAM:  GENERAL: NAD, well-developed  HEAD:  Atraumatic, Normocephalic  EYES: EOMI, PERRLA, conjunctiva and sclera clear  NECK: Supple, No JVD  CHEST/LUNG: Clear to auscultation bilaterally; No wheeze  HEART: Regular rate and rhythm; Normal S1 S2, No murmurs, rubs, or gallops  ABDOMEN: Soft, Nontender, Nondistended; Bowel sounds present  EXTREMITIES:  2+ Peripheral Pulses, No clubbing, cyanosis, or edema  PSYCH: AAOx3  NEUROLOGY: non-focal  SKIN: No rashes or lesions    LABS:                        13.8   21.78 )-----------( 237      ( 19 Oct 2019 06:00 )             39.9     Hgb Trend: 13.8<--, 14.3<--  10-19    131<L>  |  97<L>  |  23  ----------------------------<  158<H>  3.9   |  21<L>  |  0.90    Ca    8.5      19 Oct 2019 06:00  Phos  2.6     10-19  Mg     2.0     10-19    TPro  6.5  /  Alb  3.5  /  TBili  0.5  /  DBili  x   /  AST  15  /  ALT  18  /  AlkPhos  77  10-18    Creatinine Trend: 0.90<--, 1.27<--  LIVER FUNCTIONS - ( 18 Oct 2019 19:50 )  Alb: 3.5 g/dL / Pro: 6.5 g/dL / ALK PHOS: 77 u/L / ALT: 18 u/L / AST: 15 u/L / GGT: x                 Urinalysis Basic - ( 19 Oct 2019 00:55 )    Color: LIGHT YELLOW / Appearance: CLEAR / S.029 / pH: 6.5  Gluc: NEGATIVE / Ketone: NEGATIVE  / Bili: NEGATIVE / Urobili: NORMAL   Blood: NEGATIVE / Protein: NEGATIVE / Nitrite: NEGATIVE   Leuk Esterase: NEGATIVE / RBC: x / WBC x   Sq Epi: x / Non Sq Epi: x / Bacteria: x Manuelamayra Blum   PGY-1 Resident Physician   Pager [phone] from 7am to 7pm, after 7pm page night float     Patient is a 69y old  Male who presents with a chief complaint of facial edema and pain (19 Oct 2019 03:22)    History obtained with assistance of [employer] , language: Belarusian,  #: [phone].     SUBJECTIVE / OVERNIGHT EVENTS: Patient admitted overnight. This morning, patient sleepy, reports generalized weakness and improved breathing, denies chest pain/shortness of breath/abdominal pain/nausea/vomiting.     MEDICATIONS  (STANDING):  amLODIPine   Tablet 5 milliGRAM(s) Oral daily  aspirin enteric coated 81 milliGRAM(s) Oral daily  clindamycin IVPB 600 milliGRAM(s) IV Intermittent every 8 hours  dextrose 5%. 1000 milliLiter(s) (50 mL/Hr) IV Continuous <Continuous>  dextrose 50% Injectable 12.5 Gram(s) IV Push once  dextrose 50% Injectable 25 Gram(s) IV Push once  dextrose 50% Injectable 25 Gram(s) IV Push once  enoxaparin Injectable 40 milliGRAM(s) SubCutaneous daily  insulin glargine Injectable (LANTUS) 4 Unit(s) SubCutaneous at bedtime  insulin lispro (HumaLOG) corrective regimen sliding scale   SubCutaneous three times a day before meals  insulin lispro (HumaLOG) corrective regimen sliding scale   SubCutaneous at bedtime  lactobacillus acidophilus 1 Tablet(s) Oral daily  sodium chloride 0.9%. 1000 milliLiter(s) (75 mL/Hr) IV Continuous <Continuous>    MEDICATIONS  (PRN):  dextrose 40% Gel 15 Gram(s) Oral once PRN Blood Glucose LESS THAN 70 milliGRAM(s)/deciliter  glucagon  Injectable 1 milliGRAM(s) IntraMuscular once PRN Glucose LESS THAN 70 milligrams/deciliter    Allergies  No Known Allergies    Vital Signs Last 24 Hrs  T(C): 37.9 (19 Oct 2019 06:08), Max: 37.9 (18 Oct 2019 18:20)  T(F): 100.3 (19 Oct 2019 06:08), Max: 100.3 (19 Oct 2019 06:08)  HR: 88 (19 Oct 2019 06:08) (68 - 88)  BP: 149/71 (19 Oct 2019 06:08) (110/62 - 153/71)  BP(mean): --  RR: 18 (19 Oct 2019 06:08) (16 - 18)  SpO2: 100% (19 Oct 2019 06:08) (99% - 100%)  Daily Height in cm: 157.48 (19 Oct 2019 03:15)    Daily   CAPILLARY BLOOD GLUCOSE      POCT Blood Glucose.: 87 mg/dL (19 Oct 2019 08:22)  POCT Blood Glucose.: 166 mg/dL (18 Oct 2019 18:39)    PHYSICAL EXAM:  General: Patient lying in bed, sleepy appearing, but alert to voice commands, in no acute distress  HEENT: bilateral periorbital swelling, able to open eyes but preferred them closed, lip swelling, clear oropharynx, moist mucous membrane, +cervical lymphadenopathy, no axillary lymphadenopathy   CV: regular rate/rhythm, no murmur/gallop/rubs  Pulm: normal respiratory effort, no use of accessory muscles, lungs clear to auscultation bilaterally, no wheezes/crackles   Abd: soft, nondistended, + suprapubic tenderness, normal active bowel sounds   Extremities: no edema bilateral lower extremities, palpable bilateral DP pulses.   Skin: no rashes     LABS:                        13.8   21.78 )-----------( 237      ( 19 Oct 2019 06:00 )             39.9     Hgb Trend: 13.8<--, 14.3<--  10-19    131<L>  |  97<L>  |  23  ----------------------------<  158<H>  3.9   |  21<L>  |  0.90    Ca    8.5      19 Oct 2019 06:00  Phos  2.6     10-  Mg     2.0     10-    TPro  6.5  /  Alb  3.5  /  TBili  0.5  /  DBili  x   /  AST  15  /  ALT  18  /  AlkPhos  77  10-18    Creatinine Trend: 0.90<--, 1.27<--  LIVER FUNCTIONS - ( 18 Oct 2019 19:50 )  Alb: 3.5 g/dL / Pro: 6.5 g/dL / ALK PHOS: 77 u/L / ALT: 18 u/L / AST: 15 u/L / GGT: x           Urinalysis Basic - ( 19 Oct 2019 00:55 )    Color: LIGHT YELLOW / Appearance: CLEAR / S.029 / pH: 6.5  Gluc: NEGATIVE / Ketone: NEGATIVE  / Bili: NEGATIVE / Urobili: NORMAL   Blood: NEGATIVE / Protein: NEGATIVE / Nitrite: NEGATIVE   Leuk Esterase: NEGATIVE / RBC: x / WBC x   Sq Epi: x / Non Sq Epi: x / Bacteria: x    < from: CT Chest w/ IV Cont (10.18.19 @ 23:06) >  INDINGS:    LUNGS AND AIRWAYS: Patent central airways.  Bibasilar atelectasis. Right.   Left lower lobe anterior nodule adjacent to the major fissure measures   0.4 cm, unchanged from 2019 (2:58). Right upper lobe 0.2 cm   calcified granuloma (603:39).    PLEURA: No pleural effusion.    MEDIASTINUM AND RICHARD: No lymphadenopathy.    VESSELS: Superior vena and visualized bilateral brachiocephalic, and   internal jugular veins are normal in caliber and opacification.   Subclavian vein suboptimally evaluated. Atherosclerosis of the aorta.   Coronary stent.    HEART: Cardiomegaly. No pericardial effusion. Left chest wall pacemaker   with leads in the right atrium and right ventricle.    CHEST WALL AND LOWER NECK: Within normal limits.    VISUALIZED UPPER ABDOMEN: Cholecystectomy. Mild intrahepatic ductal   dilatation and CBD dilatation within normal limits for post   cholecystectomy.     BONES: Degenerative spinal disease.    IMPRESSION:     No evidence of superior vena cava obstruction.      < end of copied text > Manuelamayra Blum   PGY-1 Resident Physician   Pager [phone] from 7am to 7pm, after 7pm page night float     Patient is a 69y old  Male who presents with a chief complaint of facial edema and pain (19 Oct 2019 03:22)    History obtained with assistance of [employer] , language: Yakut,  #: [phone].     SUBJECTIVE / OVERNIGHT EVENTS: Patient admitted overnight. This morning, patient sleepy, reports generalized weakness and improved breathing, denies fevers, chest pain/shortness of breath/abdominal pain/nausea/vomiting.  has b/l sinus pain.     MEDICATIONS  (STANDING):  amLODIPine   Tablet 5 milliGRAM(s) Oral daily  aspirin enteric coated 81 milliGRAM(s) Oral daily  clindamycin IVPB 600 milliGRAM(s) IV Intermittent every 8 hours  dextrose 5%. 1000 milliLiter(s) (50 mL/Hr) IV Continuous <Continuous>  dextrose 50% Injectable 12.5 Gram(s) IV Push once  dextrose 50% Injectable 25 Gram(s) IV Push once  dextrose 50% Injectable 25 Gram(s) IV Push once  enoxaparin Injectable 40 milliGRAM(s) SubCutaneous daily  insulin glargine Injectable (LANTUS) 4 Unit(s) SubCutaneous at bedtime  insulin lispro (HumaLOG) corrective regimen sliding scale   SubCutaneous three times a day before meals  insulin lispro (HumaLOG) corrective regimen sliding scale   SubCutaneous at bedtime  lactobacillus acidophilus 1 Tablet(s) Oral daily  sodium chloride 0.9%. 1000 milliLiter(s) (75 mL/Hr) IV Continuous <Continuous>    MEDICATIONS  (PRN):  dextrose 40% Gel 15 Gram(s) Oral once PRN Blood Glucose LESS THAN 70 milliGRAM(s)/deciliter  glucagon  Injectable 1 milliGRAM(s) IntraMuscular once PRN Glucose LESS THAN 70 milligrams/deciliter    Allergies  No Known Allergies    Vital Signs Last 24 Hrs  T(C): 37.9 (19 Oct 2019 06:08), Max: 37.9 (18 Oct 2019 18:20)  T(F): 100.3 (19 Oct 2019 06:08), Max: 100.3 (19 Oct 2019 06:08)  HR: 88 (19 Oct 2019 06:08) (68 - 88)  BP: 149/71 (19 Oct 2019 06:08) (110/62 - 153/71)  BP(mean): --  RR: 18 (19 Oct 2019 06:08) (16 - 18)  SpO2: 100% (19 Oct 2019 06:08) (99% - 100%)  Daily Height in cm: 157.48 (19 Oct 2019 03:15)    Daily   CAPILLARY BLOOD GLUCOSE      POCT Blood Glucose.: 87 mg/dL (19 Oct 2019 08:22)  POCT Blood Glucose.: 166 mg/dL (18 Oct 2019 18:39)    PHYSICAL EXAM:  General: Patient lying in bed, sleepy appearing, but alert to voice commands, in no acute distress  HEENT: bilateral periorbital swelling, able to open eyes but preferred them closed, lip swelling, clear oropharynx, moist mucous membrane, +cervical lymphadenopathy, no axillary lymphadenopathy   CV: regular rate/rhythm, no murmur/gallop/rubs  Pulm: normal respiratory effort, no use of accessory muscles, lungs clear to auscultation bilaterally, no wheezes/crackles   Abd: soft, nondistended, normal active bowel sounds   Extremities: no edema bilateral lower extremities, palpable bilateral DP pulses.   Psych: normal mood and affect, follows commands  Skin: no rashes     LABS:                        13.8   21.78 )-----------( 237      ( 19 Oct 2019 06:00 )             39.9     Hgb Trend: 13.8<--, 14.3<--  10-19    131<L>  |  97<L>  |  23  ----------------------------<  158<H>  3.9   |  21<L>  |  0.90    Ca    8.5      19 Oct 2019 06:00  Phos  2.6     10-19  Mg     2.0     10-19    TPro  6.5  /  Alb  3.5  /  TBili  0.5  /  DBili  x   /  AST  15  /  ALT  18  /  AlkPhos  77  10-18    Creatinine Trend: 0.90<--, 1.27<--  LIVER FUNCTIONS - ( 18 Oct 2019 19:50 )  Alb: 3.5 g/dL / Pro: 6.5 g/dL / ALK PHOS: 77 u/L / ALT: 18 u/L / AST: 15 u/L / GGT: x           Urinalysis Basic - ( 19 Oct 2019 00:55 )    Color: LIGHT YELLOW / Appearance: CLEAR / S.029 / pH: 6.5  Gluc: NEGATIVE / Ketone: NEGATIVE  / Bili: NEGATIVE / Urobili: NORMAL   Blood: NEGATIVE / Protein: NEGATIVE / Nitrite: NEGATIVE   Leuk Esterase: NEGATIVE / RBC: x / WBC x   Sq Epi: x / Non Sq Epi: x / Bacteria: x    < from: CT Chest w/ IV Cont (10.18.19 @ 23:06) >  INDINGS:    LUNGS AND AIRWAYS: Patent central airways.  Bibasilar atelectasis. Right.   Left lower lobe anterior nodule adjacent to the major fissure measures   0.4 cm, unchanged from 2019 (2:58). Right upper lobe 0.2 cm   calcified granuloma (603:39).    PLEURA: No pleural effusion.    MEDIASTINUM AND RICHARD: No lymphadenopathy.    VESSELS: Superior vena and visualized bilateral brachiocephalic, and   internal jugular veins are normal in caliber and opacification.   Subclavian vein suboptimally evaluated. Atherosclerosis of the aorta.   Coronary stent.    HEART: Cardiomegaly. No pericardial effusion. Left chest wall pacemaker   with leads in the right atrium and right ventricle.    CHEST WALL AND LOWER NECK: Within normal limits.    VISUALIZED UPPER ABDOMEN: Cholecystectomy. Mild intrahepatic ductal   dilatation and CBD dilatation within normal limits for post   cholecystectomy.     BONES: Degenerative spinal disease.    IMPRESSION:     No evidence of superior vena cava obstruction.      < end of copied text >    consults:  discussed sinusitis plan with END

## 2019-10-19 NOTE — ED ADULT NURSE REASSESSMENT NOTE - NS ED NURSE REASSESS COMMENT FT1
Report received from GREGG Sorto. Pt is A&Ox4, ambulates independently. Respirations even & unlabored, pt denies chest pain, dyspnea, N/V/D, chills, fever, weakness, dizziness headache, cough. Impetigo noted to pt's nose.

## 2019-10-19 NOTE — H&P ADULT - PROBLEM SELECTOR PLAN 4
-likely in setting of steroid use/impetigo infection   -can monitor for improvement off steroids and on antibiotics -generalized weakness that began with initiation of steroids 10 days ago   -will hold steroids and statin at this time to see if there is improvement in symptoms   -check CK level   -PT eval

## 2019-10-19 NOTE — H&P ADULT - NSHPLABSRESULTS_GEN_ALL_CORE
.  LABS:                         14.3   . )-----------( 248      ( 18 Oct 2019 19:50 )             41.3     10-    126<L>  |  92<L>  |  33<H>  ----------------------------<  141<H>  4.4   |  24  |  1.27    Ca    9.0      18 Oct 2019 19:50    TPro  6.5  /  Alb  3.5  /  TBili  0.5  /  DBili  x   /  AST  15  /  ALT  18  /  AlkPhos  77  10      Urinalysis Basic - ( 19 Oct 2019 00:55 )    Color: LIGHT YELLOW / Appearance: CLEAR / S.029 / pH: 6.5  Gluc: NEGATIVE / Ketone: NEGATIVE  / Bili: NEGATIVE / Urobili: NORMAL   Blood: NEGATIVE / Protein: NEGATIVE / Nitrite: NEGATIVE   Leuk Esterase: NEGATIVE / RBC: x / WBC x   Sq Epi: x / Non Sq Epi: x / Bacteria: x            RADIOLOGY, EKG & ADDITIONAL TESTS: Reviewed. .  LABS:                         14.3   19.26 )-----------( 248      ( 18 Oct 2019 19:50 )             41.3     10-18    126<L>  |  92<L>  |  33<H>  ----------------------------<  141<H>  4.4   |  24  |  1.27    Ca    9.0      18 Oct 2019 19:50    TPro  6.5  /  Alb  3.5  /  TBili  0.5  /  DBili  x   /  AST  15  /  ALT  18  /  AlkPhos  77  10-18      Urinalysis Basic - ( 19 Oct 2019 00:55 )    Color: LIGHT YELLOW / Appearance: CLEAR / S.029 / pH: 6.5  Gluc: NEGATIVE / Ketone: NEGATIVE  / Bili: NEGATIVE / Urobili: NORMAL   Blood: NEGATIVE / Protein: NEGATIVE / Nitrite: NEGATIVE   Leuk Esterase: NEGATIVE / RBC: x / WBC x   Sq Epi: x / Non Sq Epi: x / Bacteria: x            RADIOLOGY, EKG & ADDITIONAL TESTS: Reviewed.    < from: CT Chest w/ IV Cont (10.18.19 @ 23:06) >    FINDINGS:    LUNGS AND AIRWAYS: Patent central airways.  Bibasilar atelectasis. Right.   Left lower lobe anterior nodule adjacent to the major fissure measures   0.4 cm, unchanged from 2019 (2:58). Right upper lobe 0.2 cm   calcified granuloma (603:39).    PLEURA: No pleural effusion.    MEDIASTINUM AND RICHARD: No lymphadenopathy.    VESSELS: Superior vena and visualized bilateral brachiocephalic, and   internal jugular veins are normal in caliber and opacification.   Subclavian vein suboptimally evaluated. Atherosclerosis of the aorta.   Coronary stent.    HEART: Cardiomegaly. No pericardial effusion. Left chest wall pacemaker   with leads in the right atrium and right ventricle.    CHEST WALL AND LOWER NECK: Within normal limits.    VISUALIZED UPPER ABDOMEN: Cholecystectomy. Mild intrahepatic ductal   dilatation and CBD dilatation within normal limits for post   cholecystectomy.     BONES: Degenerative spinal disease.    IMPRESSION:     No evidence of superior vena cava obstruction.    < end of copied text > .  LABS:                         14.3   19.26 )-----------( 248      ( 18 Oct 2019 19:50 )             41.3     10-18    126<L>  |  92<L>  |  33<H>  ----------------------------<  141<H>  4.4   |  24  |  1.27    Ca    9.0      18 Oct 2019 19:50    TPro  6.5  /  Alb  3.5  /  TBili  0.5  /  DBili  x   /  AST  15  /  ALT  18  /  AlkPhos  77  10-18    Urinalysis Basic - ( 19 Oct 2019 00:55 )  Color: LIGHT YELLOW / Appearance: CLEAR / S.029 / pH: 6.5  Gluc: NEGATIVE / Ketone: NEGATIVE  / Bili: NEGATIVE / Urobili: NORMAL   Blood: NEGATIVE / Protein: NEGATIVE / Nitrite: NEGATIVE   Leuk Esterase: NEGATIVE / RBC: x / WBC x   Sq Epi: x / Non Sq Epi: x / Bacteria: x    Troponin T, High Sensitivity Result (10.18.19 @ 19:50)    Troponin T, High Sensitivity: 21  Troponin T, High Sensitivity (10.19.19 @ 00:55)    Troponin T, High Sensitivity: 16    Blood Gas Venous Comprehensive (10.18.19 @ 19:50)    Blood Gas Venous - Lactate: 2.1: Please note updated reference range. mmol/L  Blood Gas Venous Comprehensive (10.19.19 @ 01:50)    Blood Gas Venous - Lactate: 1.7: Please note updated reference range. mmol/L    RADIOLOGY, EKG & ADDITIONAL TESTS: Reviewed.    < from: CT Chest w/ IV Cont (10.18.19 @ 23:06) >  FINDINGS:    LUNGS AND AIRWAYS: Patent central airways.  Bibasilar atelectasis. Right. Left lower lobe anterior nodule adjacent to the major fissure measures 0.4 cm, unchanged from 2019 (2:58). Right upper lobe 0.2 cm calcified granuloma (603:39).    PLEURA: No pleural effusion.    MEDIASTINUM AND RICHARD: No lymphadenopathy.    VESSELS: Superior vena and visualized bilateral brachiocephalic, and internal jugular veins are normal in caliber and opacification. Subclavian vein suboptimally evaluated. Atherosclerosis of the aorta. Coronary stent.    HEART: Cardiomegaly. No pericardial effusion. Left chest wall pacemaker with leads in the right atrium and right ventricle.    CHEST WALL AND LOWER NECK: Within normal limits.    VISUALIZED UPPER ABDOMEN: Cholecystectomy. Mild intrahepatic ductal dilatation and CBD dilatation within normal limits for post cholecystectomy.     BONES: Degenerative spinal disease.    IMPRESSION:   No evidence of superior vena cava obstruction.    < end of copied text >    EKG - NSR at 79, QRS 74, QTc 405, L axis deviation, LVH by voltage criteria, no significant ST-T wave changes

## 2019-10-19 NOTE — H&P ADULT - NSICDXPASTSURGICALHX_GEN_ALL_CORE_FT
PAST SURGICAL HISTORY:  Cataract Excisiion right eye    H/O cardiac pacemaker 2014    History of cholecystectomy 2009    Tumor of parotid gland s/p resection

## 2019-10-19 NOTE — H&P ADULT - PROBLEM SELECTOR PLAN 5
-s/p resection and radiation   -ENT consultation in AM to r/o tumor involvement as etiology of symptoms -likely in setting of steroid use/impetigo infection   -can monitor for improvement off steroids and on antibiotics

## 2019-10-19 NOTE — PROGRESS NOTE ADULT - PROBLEM SELECTOR PLAN 3
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 c/w abx

## 2019-10-19 NOTE — PROGRESS NOTE ADULT - PROBLEM SELECTOR PLAN 2
-Currently satting well on RA  -continue to monitor VS and breathing status   -consider ENT consultation in AM (as per primary team) to assess upper airway to ensure no tumor compromise present -Currently satting well on RA  -continue to monitor VS and breathing status Subjectively reports improving breathing. On exam, normal respiratory effort, no use of accessory muscles, lungs clear on ausculation, no wheezes.   -Currently satting well on RA    Plan:   -continue to monitor VS and breathing status -likely 2/2 inc nasal congestion  -Subjectively reports improved breathing. On exam, normal respiratory effort, no use of accessory muscles, lungs clear on ausculation, no wheezes.   -Currently satting well on RA  -continue to monitor off O2, treatment of sinusitis as stated above

## 2019-10-19 NOTE — H&P ADULT - PROBLEM SELECTOR PLAN 7
on Lantus in the AM  can continue with ISS today and monitor requirements based on PO intake to set basal dose   FS before meals and at bedtime -Hyponatremia to 126 likely in setting of low PO intake as serum osm is 273 (low)  -will hydrate with NS at 75 cc/hr   -continue to trend Na

## 2019-10-19 NOTE — PROGRESS NOTE ADULT - PROBLEM SELECTOR PLAN 4
-generalized weakness that began with initiation of steroids 10 days ago   -will hold steroids and statin at this time to see if there is improvement in symptoms   -check CK level   -PT eval -generalized weakness that began with initiation of steroids 10 days ago     Plan:   - hold steroids and statin to see if there is improvement in symptoms   -check CK level   -PT eval -s/p resection and radiation   - s/p ENT consultation, appreciate recs,   - pending CT to evaluate for tumor recurrence

## 2019-10-19 NOTE — PROGRESS NOTE ADULT - PROBLEM SELECTOR PLAN 10
DVT ppx: Lovenox   Diet: Halal  Dispo: PT eval, medical improvement DVT ppx: Lovenox   Diet: Halal  Dispo: PT eval, medical improvement.

## 2019-10-19 NOTE — PROGRESS NOTE ADULT - PROBLEM SELECTOR PLAN 8
on Lantus in the AM  can continue with ISS today and will decrease his home lantus dosing significantly to 4U given his decreased PO intake, monitor requirements based on PO intake to adjust basal dose   FS before meals and at bedtime On Lantus in the AM.    Plan:   - Lantus 4 units subQ bedtime  - Low dose sliding scale corrective insulin TID with meals/ bed time  - POC finger stick TID pre-meal/bedtime BP controlled  -continue amlodipine

## 2019-10-19 NOTE — PROGRESS NOTE ADULT - ASSESSMENT
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 ACE inhibitor angioedema with new impetigo infection. 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 ACE inhibitor angioedema with new impetigo infection. 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 ACE inhibitor angioedema with new impetigo infection. 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

## 2019-10-19 NOTE — H&P ADULT - NSHPSOCIALHISTORY_GEN_ALL_CORE
lives with wife and son  denies smoking, drinking, illegal drug use  immigrated from [facility] in 2018

## 2019-10-19 NOTE — H&P ADULT - PROBLEM SELECTOR PLAN 3
-painful, crusted lesions on nose in setting of recent steroid use, causing immunosuppression  -s/p Clindamycin   -given limited disease and stable VS, can start PO Clinda to cover for Staph -painful, crusted lesions on nose in setting of recent steroid use, causing immunosuppression  -s/p Clindamycin   -given limited disease and stable VS, start Keflex to cover MSSA and Strep -painful, crusted lesions on nose in setting of recent steroid use, causing immunosuppression  -s/p Clindamycin, continue with Clindamycin to cover MRSA

## 2019-10-19 NOTE — CONSULT NOTE ADULT - SUBJECTIVE AND OBJECTIVE BOX
Reason for Consultation: facial swelling  Requested by: medicine    Patient is a 69y old  Male who presents with a chief complaint of facial edema and pain (19 Oct 2019 09:59)    HPI:  "68 y/o Korean speaking M with hx of DM, HTN, HLD, L parotid tumor treated with surgery (2019) and radiation (last rad ), CAD s/p pacemaker and stent 8 years ago, and CKD presents with face swelling for 1 day and weakness for 1 week, prior to which he was in his usual state of health. History obtained from patient and family members (daughter and wife) at bedside using . States that 10 days ago, he began feeling very weak. Five days ago, the patient developed a painful rash on his nose, which began to crust. The night prior to admission, the patient had extreme pain of the nose, with swelling below the eyes, with difficulty breathing (states its more due to nasal congestion). He also endorsed severe headache and abdominal cramps, as well as nausea without vomiting. Ten days ago, he was prescribed Dexamethasone 2mg twice daily for ear discomfort (from 10/6 - 10/15, hasnt been on it since), as well as a multivitamin. He denies taking Dexamethasone before that. He denies fevers, chest pain, neck or throat pain, dysphagia, diarrhea/constipation, urinary symptoms, numbness/weakness of the arms, legs or neck, or neck swelling. He denies any sick contacts or recent travel. At this time, patient still has nose pain and weakness, however his other symptoms have improved.'    ENT consulted for further evaluation and management. Patient  is known to ENT service, s/p left parotidectomy and selective neck dissection with cervicofacial flap on 2019 with postop radiation completed 2019. Patient reports pain over his face and nose that has progressed over the past week. He denies any purulent drainage from his nose, but reports nasal congestion. Has never had similar symptoms in the past. Denies any difficulty breathing or swallowing. No voice changes. Reports the swelling on the right side of his face is new. He has mild weakness of lower branches of left facial nerve, but that is from his initial parotid surgery, it is not related to current symptoms.    Birth History:  PAST MEDICAL & SURGICAL HISTORY:  Tumor of parotid gland: right side  Cardiac pacemaker:   Coronary artery disease: s/p angiogram with stent   H/O gastroesophageal reflux (GERD)  Kidney disease  DM (diabetes mellitus)  Hypertension, unspecified type  Gout  DM (diabetes mellitus): x 16 years ; fs  19 ;  Kidney disease  HLD (hyperlipidemia)  HTN (hypertension)  Tumor of parotid gland: s/p resection  Cataract: Excisiion right eye  H/O cardiac pacemaker:   History of cholecystectomy:     FAMILY HISTORY:  Family history of oral cancer: brother       MEDICATIONS  (STANDING):  amLODIPine   Tablet 5 milliGRAM(s) Oral daily  aspirin enteric coated 81 milliGRAM(s) Oral daily  clindamycin IVPB 600 milliGRAM(s) IV Intermittent every 8 hours  dextrose 5%. 1000 milliLiter(s) (50 mL/Hr) IV Continuous <Continuous>  dextrose 50% Injectable 12.5 Gram(s) IV Push once  dextrose 50% Injectable 25 Gram(s) IV Push once  dextrose 50% Injectable 25 Gram(s) IV Push once  enoxaparin Injectable 40 milliGRAM(s) SubCutaneous daily  insulin glargine Injectable (LANTUS) 4 Unit(s) SubCutaneous at bedtime  insulin lispro (HumaLOG) corrective regimen sliding scale   SubCutaneous three times a day before meals  insulin lispro (HumaLOG) corrective regimen sliding scale   SubCutaneous at bedtime  lactobacillus acidophilus 1 Tablet(s) Oral daily    MEDICATIONS  (PRN):  dextrose 40% Gel 15 Gram(s) Oral once PRN Blood Glucose LESS THAN 70 milliGRAM(s)/deciliter  glucagon  Injectable 1 milliGRAM(s) IntraMuscular once PRN Glucose LESS THAN 70 milligrams/deciliter    Allergies    No Known Allergies    Intolerances        REVIEW OF SYSTEMS:    CONSTITUTIONAL: No fever, weight loss, or fatigue  EYES: No eye pain, visual disturbances, or discharge  ENMT:  No difficulty hearing, tinnitus, vertigo; No sinus or throat pain  NECK: No pain or stiffness  BREASTS: No pain, masses, or nipple discharge  RESPIRATORY: No cough, wheezing, chills or hemoptysis; No shortness of breath  CARDIOVASCULAR: No chest pain, palpitations, dizziness, or leg swelling  GASTROINTESTINAL: No abdominal or epigastric pain. No nausea, vomiting, or hematemesis; No diarrhea or constipation. No melena or hematochezia.  GENITOURINARY: No dysuria, frequency, hematuria, or incontinence  NEUROLOGICAL: No headaches, memory loss, loss of strength, numbness, or tremors  SKIN: No itching, burning, rashes, or lesions   LYMPH NODES: No enlarged glands  ENDOCRINE: No heat or cold intolerance; No hair loss  MUSCULOSKELETAL: No joint pain or swelling; No muscle, back, or extremity pain  PSYCHIATRIC: No depression, anxiety, mood swings, or difficulty sleeping                          13.8   21.78 )-----------( 237      ( 19 Oct 2019 06:00 )             39.9     10-19    131<L>  |  97<L>  |  23  ----------------------------<  158<H>  3.9   |  21<L>  |  0.90    Ca    8.5      19 Oct 2019 06:00  Phos  2.6     10-19  Mg     2.0     10-19    TPro  6.5  /  Alb  3.5  /  TBili  0.5  /  DBili  x   /  AST  15  /  ALT  18  /  AlkPhos  77  10-18    Vital Signs Last 24 Hrs  T(C): 36.8 (19 Oct 2019 12:07), Max: 37.9 (18 Oct 2019 18:20)  T(F): 98.2 (19 Oct 2019 12:07), Max: 100.3 (19 Oct 2019 06:08)  HR: 94 (19 Oct 2019 12:07) (68 - 94)  BP: 136/59 (19 Oct 2019 12:07) (110/62 - 153/71)  BP(mean): --  RR: 18 (19 Oct 2019 12:07) (16 - 18)  SpO2: 99% (19 Oct 2019 12:07) (96% - 100%)      PHYSICAL EXAM:  Constitutional Normal: well nourished, well developed, non-dysmorphic, no acute distress    Psychiatric: age appropriate behavior, cooperative    Face: mild swelling on the right side of face near eye and cheek, minimal erythema, no palpable fluctuance, no skin lesions    Lymphatic: no cervical lymphadenopathy    External Nose:  Erythema and swelling over dorsum, no palpable fluctuance  		  Anterior Nasal Cavity:	Mucus and crusting bilaterally, no pus drainage  					  Oral Cavity:  poor dentition, tongue wnl, FOM soft, no dilation of submandibular ducts, normal saliva, no purulent drainage from parotid ducts bilaterally    Tonsilar Size: 1+ bilaterally    Neck: left neck incision from prior parotidectomy c/d/i, skin thickening and firmness on left side and in submandibular region most likely post surgical/radiation changes, no palpable fluctuance    Pulmonary: No Acute Distress.     Neurologic: awake and alert Reason for Consultation: facial swelling  Requested by: medicine    Patient is a 69y old  Male who presents with a chief complaint of facial edema and pain (19 Oct 2019 09:59)    HPI:  "70 y/o Persian speaking M with hx of DM, HTN, HLD, L parotid tumor treated with surgery (2019) and radiation (last rad ), CAD s/p pacemaker and stent 8 years ago, and CKD presents with face swelling for 1 day and weakness for 1 week, prior to which he was in his usual state of health. History obtained from patient and family members (daughter and wife) at bedside using . States that 10 days ago, he began feeling very weak. Five days ago, the patient developed a painful rash on his nose, which began to crust. The night prior to admission, the patient had extreme pain of the nose, with swelling below the eyes, with difficulty breathing (states its more due to nasal congestion). He also endorsed severe headache and abdominal cramps, as well as nausea without vomiting. Ten days ago, he was prescribed Dexamethasone 2mg twice daily for ear discomfort (from 10/6 - 10/15, hasnt been on it since), as well as a multivitamin. He denies taking Dexamethasone before that. He denies fevers, chest pain, neck or throat pain, dysphagia, diarrhea/constipation, urinary symptoms, numbness/weakness of the arms, legs or neck, or neck swelling. He denies any sick contacts or recent travel. At this time, patient still has nose pain and weakness, however his other symptoms have improved.'    ENT consulted for further evaluation and management. Patient  is known to ENT service, s/p left parotidectomy and selective neck dissection with cervicofacial flap on 2019 with postop radiation completed 2019. Patient reports pain over his face and nose that has progressed over the past week. He denies any purulent drainage from his nose, but reports nasal congestion. Has never had similar symptoms in the past. Denies any difficulty breathing or swallowing. No voice changes. Reports the swelling on the right side of his face is new. He has mild weakness of lower branches of left facial nerve, but that is from his initial parotid surgery, it is not related to current symptoms.    Birth History:  PAST MEDICAL & SURGICAL HISTORY:  Tumor of parotid gland: right side  Cardiac pacemaker:   Coronary artery disease: s/p angiogram with stent   H/O gastroesophageal reflux (GERD)  Kidney disease  DM (diabetes mellitus)  Hypertension, unspecified type  Gout  DM (diabetes mellitus): x 16 years ; fs  19 ;  Kidney disease  HLD (hyperlipidemia)  HTN (hypertension)  Tumor of parotid gland: s/p resection  Cataract: Excisiion right eye  H/O cardiac pacemaker:   History of cholecystectomy:     FAMILY HISTORY:  Family history of oral cancer: brother       MEDICATIONS  (STANDING):  amLODIPine   Tablet 5 milliGRAM(s) Oral daily  aspirin enteric coated 81 milliGRAM(s) Oral daily  clindamycin IVPB 600 milliGRAM(s) IV Intermittent every 8 hours  dextrose 5%. 1000 milliLiter(s) (50 mL/Hr) IV Continuous <Continuous>  dextrose 50% Injectable 12.5 Gram(s) IV Push once  dextrose 50% Injectable 25 Gram(s) IV Push once  dextrose 50% Injectable 25 Gram(s) IV Push once  enoxaparin Injectable 40 milliGRAM(s) SubCutaneous daily  insulin glargine Injectable (LANTUS) 4 Unit(s) SubCutaneous at bedtime  insulin lispro (HumaLOG) corrective regimen sliding scale   SubCutaneous three times a day before meals  insulin lispro (HumaLOG) corrective regimen sliding scale   SubCutaneous at bedtime  lactobacillus acidophilus 1 Tablet(s) Oral daily    MEDICATIONS  (PRN):  dextrose 40% Gel 15 Gram(s) Oral once PRN Blood Glucose LESS THAN 70 milliGRAM(s)/deciliter  glucagon  Injectable 1 milliGRAM(s) IntraMuscular once PRN Glucose LESS THAN 70 milligrams/deciliter    Allergies    No Known Allergies    Intolerances        REVIEW OF SYSTEMS:    CONSTITUTIONAL: No fever, weight loss, or fatigue  EYES: No eye pain, visual disturbances, or discharge  ENMT:  No difficulty hearing, tinnitus, vertigo; No sinus or throat pain  NECK: No pain or stiffness  BREASTS: No pain, masses, or nipple discharge  RESPIRATORY: No cough, wheezing, chills or hemoptysis; No shortness of breath  CARDIOVASCULAR: No chest pain, palpitations, dizziness, or leg swelling  GASTROINTESTINAL: No abdominal or epigastric pain. No nausea, vomiting, or hematemesis; No diarrhea or constipation. No melena or hematochezia.  GENITOURINARY: No dysuria, frequency, hematuria, or incontinence  NEUROLOGICAL: No headaches, memory loss, loss of strength, numbness, or tremors  SKIN: No itching, burning, rashes, or lesions   LYMPH NODES: No enlarged glands  ENDOCRINE: No heat or cold intolerance; No hair loss  MUSCULOSKELETAL: No joint pain or swelling; No muscle, back, or extremity pain  PSYCHIATRIC: No depression, anxiety, mood swings, or difficulty sleeping                          13.8   21.78 )-----------( 237      ( 19 Oct 2019 06:00 )             39.9     10-19    131<L>  |  97<L>  |  23  ----------------------------<  158<H>  3.9   |  21<L>  |  0.90    Ca    8.5      19 Oct 2019 06:00  Phos  2.6     10-19  Mg     2.0     10-19    TPro  6.5  /  Alb  3.5  /  TBili  0.5  /  DBili  x   /  AST  15  /  ALT  18  /  AlkPhos  77  10-18    Vital Signs Last 24 Hrs  T(C): 36.8 (19 Oct 2019 12:07), Max: 37.9 (18 Oct 2019 18:20)  T(F): 98.2 (19 Oct 2019 12:07), Max: 100.3 (19 Oct 2019 06:08)  HR: 94 (19 Oct 2019 12:07) (68 - 94)  BP: 136/59 (19 Oct 2019 12:07) (110/62 - 153/71)  BP(mean): --  RR: 18 (19 Oct 2019 12:07) (16 - 18)  SpO2: 99% (19 Oct 2019 12:07) (96% - 100%)      PHYSICAL EXAM:  Constitutional Normal: well nourished, well developed, non-dysmorphic, no acute distress    Psychiatric: age appropriate behavior, cooperative    Face: mild swelling on the right side of face near eye and cheek, minimal erythema, no palpable fluctuance, no skin lesions  Weakness of left marginal mandibular nerve, upper branches of facial nerve grossly intact, right facial nerve grossly intact    Lymphatic: no cervical lymphadenopathy    External Nose:  Erythema and swelling over dorsum, no palpable fluctuance  		  Anterior Nasal Cavity:	Mucus and crusting bilaterally, no pus drainage  					  Oral Cavity:  poor dentition, tongue wnl, FOM soft, no dilation of submandibular ducts, normal saliva, no purulent drainage from parotid ducts bilaterally    Tonsilar Size: 1+ bilaterally    Neck: left neck incision from prior parotidectomy c/d/i, skin thickening and firmness on left side and in submandibular region most likely post surgical/radiation changes, no palpable fluctuance    Pulmonary: No Acute Distress.     Neurologic: awake and alert

## 2019-10-19 NOTE — H&P ADULT - ASSESSMENT
70 y/o Yakut speaking M with hx of DM, HTN, HLD, R throat tumor treated with surgery (April 2019) and radiation (last rad August 16), CAD s/p pacemaker and stent 8 years ago, and CKD presents with face swelling for 1 day and weakness for 1 week, with crusted lesions on nose admitted for weakness and swelling 2/2 to new steroid use with new impetigo infection. 70 y/o Hebrew speaking M with hx of DM, HTN, HLD, L parotid tumor treated with surgery (April 2019) and radiation (last rad August 16), CAD s/p pacemaker and stent 8 years ago, and CKD presents with face swelling for 1 day and weakness for 1 week, with crusted lesions on nose admitted for weakness and swelling 2/2 to new steroid use vs. ACE inhibitor angioedema with new impetigo infection. 70 y/o Malay speaking M with hx of DM, HTN, HLD, L parotid tumor treated with surgery (April 2019) and radiation (last rad August 16), CAD s/p pacemaker and stent 8 years ago, and CKD presents with face swelling for 1 day and weakness for 1 week, with crusted lesions on nose admitted for weakness and swelling 2/2 to cellulitis of face likely from his impetigo.

## 2019-10-19 NOTE — H&P ADULT - PROBLEM SELECTOR PLAN 6
-Hyponatremia to 126 likely in setting of low PO intake as serum osm is 273 (low)  -will hydrate with NS at 75 cc/hr   -continue to trend Na -s/p resection and radiation   -ENT consultation in AM to r/o tumor involvement as etiology of symptoms -s/p resection and radiation   - Consider ENT consultation in AM to r/o tumor involvement as etiology of symptoms

## 2019-10-19 NOTE — H&P ADULT - NSICDXPASTMEDICALHX_GEN_ALL_CORE_FT
PAST MEDICAL HISTORY:  Cardiac pacemaker 2014    Coronary artery disease s/p angiogram with stent 2011    DM (diabetes mellitus) x 16 years ; fs  4/09/19 ;    DM (diabetes mellitus)     Gout     H/O gastroesophageal reflux (GERD)     HLD (hyperlipidemia)     HTN (hypertension)     Hypertension, unspecified type     Kidney disease     Kidney disease     Tumor of parotid gland right side

## 2019-10-19 NOTE — PROGRESS NOTE ADULT - PROBLEM SELECTOR PLAN 7
-Hyponatremia to 126 likely in setting of low PO intake as serum osm is 273 (low)  -will hydrate with NS at 75 cc/hr   -continue to trend Na Assessment: Resolving. Na 126 --> 131.   - likely in setting of low PO intake as serum osm is 273 (low)  - mental status baseline   Plan:   -continue to trend Na - Lantus 4 units subQ bedtime  - Low dose sliding scale corrective insulin TID with meals/ bed time  - POC finger stick TID pre-meal/bedtime

## 2019-10-20 LAB
ANION GAP SERPL CALC-SCNC: 11 MMO/L — SIGNIFICANT CHANGE UP (ref 7–14)
ANION GAP SERPL CALC-SCNC: 12 MMO/L — SIGNIFICANT CHANGE UP (ref 7–14)
BACTERIA UR CULT: SIGNIFICANT CHANGE UP
BUN SERPL-MCNC: 19 MG/DL — SIGNIFICANT CHANGE UP (ref 7–23)
BUN SERPL-MCNC: 20 MG/DL — SIGNIFICANT CHANGE UP (ref 7–23)
CALCIUM SERPL-MCNC: 7.9 MG/DL — LOW (ref 8.4–10.5)
CALCIUM SERPL-MCNC: 8 MG/DL — LOW (ref 8.4–10.5)
CHLORIDE SERPL-SCNC: 92 MMOL/L — LOW (ref 98–107)
CHLORIDE SERPL-SCNC: 94 MMOL/L — LOW (ref 98–107)
CO2 SERPL-SCNC: 20 MMOL/L — LOW (ref 22–31)
CO2 SERPL-SCNC: 21 MMOL/L — LOW (ref 22–31)
CREAT ?TM UR-MCNC: 58.9 MG/DL — SIGNIFICANT CHANGE UP
CREAT SERPL-MCNC: 1.08 MG/DL — SIGNIFICANT CHANGE UP (ref 0.5–1.3)
CREAT SERPL-MCNC: 1.15 MG/DL — SIGNIFICANT CHANGE UP (ref 0.5–1.3)
GLUCOSE SERPL-MCNC: 183 MG/DL — HIGH (ref 70–99)
GLUCOSE SERPL-MCNC: 231 MG/DL — HIGH (ref 70–99)
HBA1C BLD-MCNC: 7.1 % — HIGH (ref 4–5.6)
HCT VFR BLD CALC: 35.5 % — LOW (ref 39–50)
HCV AB S/CO SERPL IA: 0.13 S/CO — SIGNIFICANT CHANGE UP (ref 0–0.99)
HCV AB SERPL-IMP: SIGNIFICANT CHANGE UP
HGB BLD-MCNC: 12.5 G/DL — LOW (ref 13–17)
MAGNESIUM SERPL-MCNC: 1.8 MG/DL — SIGNIFICANT CHANGE UP (ref 1.6–2.6)
MAGNESIUM SERPL-MCNC: 1.8 MG/DL — SIGNIFICANT CHANGE UP (ref 1.6–2.6)
MCHC RBC-ENTMCNC: 30 PG — SIGNIFICANT CHANGE UP (ref 27–34)
MCHC RBC-ENTMCNC: 35.2 % — SIGNIFICANT CHANGE UP (ref 32–36)
MCV RBC AUTO: 85.3 FL — SIGNIFICANT CHANGE UP (ref 80–100)
NRBC # FLD: 0 K/UL — SIGNIFICANT CHANGE UP (ref 0–0)
OSMOLALITY SERPL: 270 MOSMO/KG — LOW (ref 275–295)
OSMOLALITY UR: 519 MOSMO/KG — SIGNIFICANT CHANGE UP (ref 50–1200)
PHOSPHATE SERPL-MCNC: 2.3 MG/DL — LOW (ref 2.5–4.5)
PHOSPHATE SERPL-MCNC: 3.2 MG/DL — SIGNIFICANT CHANGE UP (ref 2.5–4.5)
PLATELET # BLD AUTO: 204 K/UL — SIGNIFICANT CHANGE UP (ref 150–400)
PMV BLD: 9.9 FL — SIGNIFICANT CHANGE UP (ref 7–13)
POTASSIUM SERPL-MCNC: 3.6 MMOL/L — SIGNIFICANT CHANGE UP (ref 3.5–5.3)
POTASSIUM SERPL-MCNC: 3.8 MMOL/L — SIGNIFICANT CHANGE UP (ref 3.5–5.3)
POTASSIUM SERPL-SCNC: 3.6 MMOL/L — SIGNIFICANT CHANGE UP (ref 3.5–5.3)
POTASSIUM SERPL-SCNC: 3.8 MMOL/L — SIGNIFICANT CHANGE UP (ref 3.5–5.3)
RBC # BLD: 4.16 M/UL — LOW (ref 4.2–5.8)
RBC # FLD: 12.7 % — SIGNIFICANT CHANGE UP (ref 10.3–14.5)
SODIUM SERPL-SCNC: 124 MMOL/L — LOW (ref 135–145)
SODIUM SERPL-SCNC: 126 MMOL/L — LOW (ref 135–145)
SODIUM UR-SCNC: 83 MMOL/L — SIGNIFICANT CHANGE UP
SPECIMEN SOURCE: SIGNIFICANT CHANGE UP
SPECIMEN SOURCE: SIGNIFICANT CHANGE UP
WBC # BLD: 19.86 K/UL — HIGH (ref 3.8–10.5)
WBC # FLD AUTO: 19.86 K/UL — HIGH (ref 3.8–10.5)

## 2019-10-20 PROCEDURE — 70487 CT MAXILLOFACIAL W/DYE: CPT | Mod: 26

## 2019-10-20 PROCEDURE — 99223 1ST HOSP IP/OBS HIGH 75: CPT

## 2019-10-20 PROCEDURE — 99233 SBSQ HOSP IP/OBS HIGH 50: CPT | Mod: GC

## 2019-10-20 RX ORDER — MUPIROCIN 20 MG/G
1 OINTMENT TOPICAL
Refills: 0 | Status: DISCONTINUED | OUTPATIENT
Start: 2019-10-20 | End: 2019-10-20

## 2019-10-20 RX ORDER — SODIUM CHLORIDE 0.65 %
1 AEROSOL, SPRAY (ML) NASAL EVERY 4 HOURS
Refills: 0 | Status: DISCONTINUED | OUTPATIENT
Start: 2019-10-20 | End: 2019-10-20

## 2019-10-20 RX ORDER — INSULIN GLARGINE 100 [IU]/ML
6 INJECTION, SOLUTION SUBCUTANEOUS AT BEDTIME
Refills: 0 | Status: DISCONTINUED | OUTPATIENT
Start: 2019-10-20 | End: 2019-10-22

## 2019-10-20 RX ORDER — SODIUM CHLORIDE 0.65 %
1 AEROSOL, SPRAY (ML) NASAL EVERY 4 HOURS
Refills: 0 | Status: DISCONTINUED | OUTPATIENT
Start: 2019-10-20 | End: 2019-10-24

## 2019-10-20 RX ORDER — SODIUM CHLORIDE 0.65 %
1 AEROSOL, SPRAY (ML) NASAL
Refills: 0 | Status: DISCONTINUED | OUTPATIENT
Start: 2019-10-20 | End: 2019-10-20

## 2019-10-20 RX ORDER — OXYMETAZOLINE HYDROCHLORIDE 0.5 MG/ML
1 SPRAY NASAL
Refills: 0 | Status: COMPLETED | OUTPATIENT
Start: 2019-10-20 | End: 2019-10-23

## 2019-10-20 RX ORDER — SODIUM CHLORIDE 9 MG/ML
1 INJECTION INTRAMUSCULAR; INTRAVENOUS; SUBCUTANEOUS
Refills: 0 | Status: DISCONTINUED | OUTPATIENT
Start: 2019-10-20 | End: 2019-10-21

## 2019-10-20 RX ORDER — ACETAMINOPHEN 500 MG
650 TABLET ORAL ONCE
Refills: 0 | Status: COMPLETED | OUTPATIENT
Start: 2019-10-20 | End: 2019-10-20

## 2019-10-20 RX ORDER — SODIUM CHLORIDE 9 MG/ML
1000 INJECTION INTRAMUSCULAR; INTRAVENOUS; SUBCUTANEOUS
Refills: 0 | Status: DISCONTINUED | OUTPATIENT
Start: 2019-10-20 | End: 2019-10-20

## 2019-10-20 RX ORDER — MUPIROCIN 20 MG/G
1 OINTMENT TOPICAL
Refills: 0 | Status: DISCONTINUED | OUTPATIENT
Start: 2019-10-20 | End: 2019-10-24

## 2019-10-20 RX ORDER — SODIUM,POTASSIUM PHOSPHATES 278-250MG
1 POWDER IN PACKET (EA) ORAL
Refills: 0 | Status: COMPLETED | OUTPATIENT
Start: 2019-10-20 | End: 2019-10-20

## 2019-10-20 RX ADMIN — Medication 650 MILLIGRAM(S): at 14:19

## 2019-10-20 RX ADMIN — Medication 81 MILLIGRAM(S): at 13:25

## 2019-10-20 RX ADMIN — Medication 1 SPRAY(S): at 13:25

## 2019-10-20 RX ADMIN — OXYMETAZOLINE HYDROCHLORIDE 1 SPRAY(S): 0.5 SPRAY NASAL at 18:18

## 2019-10-20 RX ADMIN — Medication 1 TABLET(S): at 23:18

## 2019-10-20 RX ADMIN — PIPERACILLIN AND TAZOBACTAM 25 GRAM(S): 4; .5 INJECTION, POWDER, LYOPHILIZED, FOR SOLUTION INTRAVENOUS at 14:18

## 2019-10-20 RX ADMIN — ENOXAPARIN SODIUM 40 MILLIGRAM(S): 100 INJECTION SUBCUTANEOUS at 13:25

## 2019-10-20 RX ADMIN — INSULIN GLARGINE 6 UNIT(S): 100 INJECTION, SOLUTION SUBCUTANEOUS at 23:07

## 2019-10-20 RX ADMIN — Medication 250 MILLIGRAM(S): at 18:17

## 2019-10-20 RX ADMIN — Medication 1: at 23:08

## 2019-10-20 RX ADMIN — PIPERACILLIN AND TAZOBACTAM 25 GRAM(S): 4; .5 INJECTION, POWDER, LYOPHILIZED, FOR SOLUTION INTRAVENOUS at 06:58

## 2019-10-20 RX ADMIN — SODIUM CHLORIDE 1 GRAM(S): 9 INJECTION INTRAMUSCULAR; INTRAVENOUS; SUBCUTANEOUS at 18:27

## 2019-10-20 RX ADMIN — Medication 250 MILLIGRAM(S): at 06:00

## 2019-10-20 RX ADMIN — Medication 1 TABLET(S): at 09:03

## 2019-10-20 RX ADMIN — Medication 1 SPRAY(S): at 23:08

## 2019-10-20 RX ADMIN — Medication 1 TABLET(S): at 14:19

## 2019-10-20 RX ADMIN — Medication 4: at 09:02

## 2019-10-20 RX ADMIN — Medication 650 MILLIGRAM(S): at 14:55

## 2019-10-20 RX ADMIN — SODIUM CHLORIDE 75 MILLILITER(S): 9 INJECTION INTRAMUSCULAR; INTRAVENOUS; SUBCUTANEOUS at 14:18

## 2019-10-20 RX ADMIN — Medication 1 TABLET(S): at 14:18

## 2019-10-20 RX ADMIN — PIPERACILLIN AND TAZOBACTAM 25 GRAM(S): 4; .5 INJECTION, POWDER, LYOPHILIZED, FOR SOLUTION INTRAVENOUS at 23:09

## 2019-10-20 RX ADMIN — Medication 1 SPRAY(S): at 18:18

## 2019-10-20 RX ADMIN — Medication 1: at 18:09

## 2019-10-20 RX ADMIN — Medication 1 TABLET(S): at 18:18

## 2019-10-20 RX ADMIN — Medication 3: at 13:24

## 2019-10-20 RX ADMIN — AMLODIPINE BESYLATE 5 MILLIGRAM(S): 2.5 TABLET ORAL at 06:33

## 2019-10-20 NOTE — PHYSICAL THERAPY INITIAL EVALUATION ADULT - GAIT DISTANCE, PT EVAL
10 feet/pt. reporting fatigue and wishing to return to bed. further distance to be assessed as feasible

## 2019-10-20 NOTE — PHYSICAL THERAPY INITIAL EVALUATION ADULT - PERTINENT HX OF CURRENT PROBLEM, REHAB EVAL
69 year old male with past medical history of DM, HTN, HLD, Left parotid tumor treated with surgery (April 2019) and radiation (last radiation August 16), CAD s/p pacemaker and stent 8 years ago, and CKD presents with face swelling and weakness.

## 2019-10-20 NOTE — PROGRESS NOTE ADULT - PROBLEM SELECTOR PLAN 6
Na 126 ---> 131 ---> 126  - suspect hypotonic, hypovolemic in setting dec PO intake  - check urine lytes, serum osm   - encourage PO intake, considering IV pending collection of urine lytes Na initially improved after IVF, now trend down after IVF discontinued   - suspect hypotonic, hypovolemic in setting dec PO intake.  - check urine lytes, serum osm prior to resuming IVF then resume IVF NS @75 cc/hr  -repeat BMP this evening   - encourage PO intake Na initially improved after IVF, now trend down after IVF discontinued   - suspect hypotonic, hypovolemic in setting dec PO intake.  - check urine lytes, urine osm, serum osm   - encourage PO intake Na initially improved after IVF, now trend down after IVF discontinued   - check urine lytes, urine osm, serum osm   - encourage PO intake

## 2019-10-20 NOTE — PROGRESS NOTE ADULT - PROBLEM SELECTOR PLAN 9
DVT ppx: Lovenox qd  Diet: Halal  Dispo: PT eval pending DVT ppx: Lovenox qd  Diet: Halal  Dispo: PT eval pending: rec likely home with no needs

## 2019-10-20 NOTE — PROGRESS NOTE ADULT - SUBJECTIVE AND OBJECTIVE BOX
Patient is a 69y old  Male who presents with a chief complaint of facial edema and pain (20 Oct 2019 09:09)    SUBJECTIVE / OVERNIGHT EVENTS: Patient feels worsening nasal congestion which made it difficult to sleep.     MEDICATIONS  (STANDING):  amLODIPine   Tablet 5 milliGRAM(s) Oral daily  aspirin enteric coated 81 milliGRAM(s) Oral daily  dextrose 5%. 1000 milliLiter(s) (50 mL/Hr) IV Continuous <Continuous>  dextrose 50% Injectable 12.5 Gram(s) IV Push once  dextrose 50% Injectable 25 Gram(s) IV Push once  dextrose 50% Injectable 25 Gram(s) IV Push once  enoxaparin Injectable 40 milliGRAM(s) SubCutaneous daily  insulin glargine Injectable (LANTUS) 6 Unit(s) SubCutaneous at bedtime  insulin lispro (HumaLOG) corrective regimen sliding scale   SubCutaneous three times a day before meals  insulin lispro (HumaLOG) corrective regimen sliding scale   SubCutaneous at bedtime  lactobacillus acidophilus 1 Tablet(s) Oral daily  piperacillin/tazobactam IVPB.. 3.375 Gram(s) IV Intermittent every 8 hours  potassium acid phosphate/sodium acid phosphate tablet (K-PHOS No. 2) 1 Tablet(s) Oral four times a day with meals  vancomycin  IVPB 1000 milliGRAM(s) IV Intermittent every 12 hours  vancomycin  IVPB        MEDICATIONS  (PRN):  dextrose 40% Gel 15 Gram(s) Oral once PRN Blood Glucose LESS THAN 70 milliGRAM(s)/deciliter  glucagon  Injectable 1 milliGRAM(s) IntraMuscular once PRN Glucose LESS THAN 70 milligrams/deciliter  sodium chloride 0.65% Nasal 1 Spray(s) Both Nostrils two times a day PRN Nasal Congestion      Vital Signs Last 24 Hrs  T(C): 37.4 (20 Oct 2019 06:00), Max: 37.4 (19 Oct 2019 21:53)  T(F): 99.3 (20 Oct 2019 06:00), Max: 99.4 (19 Oct 2019 21:53)  HR: 87 (20 Oct 2019 06:00) (84 - 94)  BP: 141/64 (20 Oct 2019 06:00) (128/59 - 141/64)  BP(mean): --  RR: 19 (20 Oct 2019 06:00) (18 - 19)  SpO2: 99% (20 Oct 2019 06:00) (96% - 100%)  CAPILLARY BLOOD GLUCOSE      POCT Blood Glucose.: 310 mg/dL (20 Oct 2019 08:41)  POCT Blood Glucose.: 116 mg/dL (19 Oct 2019 22:13)  POCT Blood Glucose.: 239 mg/dL (19 Oct 2019 17:20)  POCT Blood Glucose.: 139 mg/dL (19 Oct 2019 12:06)    I&O's Summary      PHYSICAL EXAM:  GENERAL: NAD, well-developed  HEAD:  Atraumatic, Normocephalic  EYES: EOMI, PERRLA, conjunctiva and sclera clear  NECK: Supple, No JVD  CHEST/LUNG: Clear to auscultation bilaterally; No wheeze  HEART: Regular rate and rhythm; No murmurs, rubs, or gallops  ABDOMEN: Soft, Nontender, Nondistended; Bowel sounds present  EXTREMITIES:  2+ Peripheral Pulses, No clubbing, cyanosis, or edema  PSYCH: AAOx3  NEUROLOGY: non-focal  SKIN: No rashes or lesions    LABS:                        12.5   19.86 )-----------( 204      ( 20 Oct 2019 06:10 )             35.5     10-20    126<L>  |  94<L>  |  20  ----------------------------<  231<H>  3.6   |  21<L>  |  1.08    Ca    8.0<L>      20 Oct 2019 06:10  Phos  2.3     10-20  Mg     1.8     10-20    TPro  6.5  /  Alb  3.5  /  TBili  0.5  /  DBili  x   /  AST  15  /  ALT  18  /  AlkPhos  77  10-18          Urinalysis Basic - ( 19 Oct 2019 00:55 )    Color: LIGHT YELLOW / Appearance: CLEAR / S.029 / pH: 6.5  Gluc: NEGATIVE / Ketone: NEGATIVE  / Bili: NEGATIVE / Urobili: NORMAL   Blood: NEGATIVE / Protein: NEGATIVE / Nitrite: NEGATIVE   Leuk Esterase: NEGATIVE / RBC: x / WBC x   Sq Epi: x / Non Sq Epi: x / Bacteria: x        RADIOLOGY & ADDITIONAL TESTS:    Imaging Personally Reviewed:    Consultant(s) Notes Reviewed:      Care Discussed with Consultants/Other Providers: Patient is a 69y old  Male who presents with a chief complaint of facial edema and pain (20 Oct 2019 09:09)    SUBJECTIVE / OVERNIGHT EVENTS: Patient feels worsening nasal congestion which made it difficult to sleep. Overall feeling very fatigued.     MEDICATIONS  (STANDING):  amLODIPine   Tablet 5 milliGRAM(s) Oral daily  aspirin enteric coated 81 milliGRAM(s) Oral daily  dextrose 5%. 1000 milliLiter(s) (50 mL/Hr) IV Continuous <Continuous>  dextrose 50% Injectable 12.5 Gram(s) IV Push once  dextrose 50% Injectable 25 Gram(s) IV Push once  dextrose 50% Injectable 25 Gram(s) IV Push once  enoxaparin Injectable 40 milliGRAM(s) SubCutaneous daily  insulin glargine Injectable (LANTUS) 6 Unit(s) SubCutaneous at bedtime  insulin lispro (HumaLOG) corrective regimen sliding scale   SubCutaneous three times a day before meals  insulin lispro (HumaLOG) corrective regimen sliding scale   SubCutaneous at bedtime  lactobacillus acidophilus 1 Tablet(s) Oral daily  piperacillin/tazobactam IVPB.. 3.375 Gram(s) IV Intermittent every 8 hours  potassium acid phosphate/sodium acid phosphate tablet (K-PHOS No. 2) 1 Tablet(s) Oral four times a day with meals  vancomycin  IVPB 1000 milliGRAM(s) IV Intermittent every 12 hours  vancomycin  IVPB        MEDICATIONS  (PRN):  dextrose 40% Gel 15 Gram(s) Oral once PRN Blood Glucose LESS THAN 70 milliGRAM(s)/deciliter  glucagon  Injectable 1 milliGRAM(s) IntraMuscular once PRN Glucose LESS THAN 70 milligrams/deciliter  sodium chloride 0.65% Nasal 1 Spray(s) Both Nostrils two times a day PRN Nasal Congestion    Vital Signs Last 24 Hrs  T(C): 37.4 (20 Oct 2019 06:00), Max: 37.4 (19 Oct 2019 21:53)  T(F): 99.3 (20 Oct 2019 06:00), Max: 99.4 (19 Oct 2019 21:53)  HR: 87 (20 Oct 2019 06:00) (84 - 94)  BP: 141/64 (20 Oct 2019 06:00) (128/59 - 141/64)  RR: 19 (20 Oct 2019 06:00) (18 - 19)  SpO2: 99% (20 Oct 2019 06:00) (96% - 100%)  CAPILLARY BLOOD GLUCOSE      POCT Blood Glucose.: 310 mg/dL (20 Oct 2019 08:41)  POCT Blood Glucose.: 116 mg/dL (19 Oct 2019 22:13)  POCT Blood Glucose.: 239 mg/dL (19 Oct 2019 17:20)  POCT Blood Glucose.: 139 mg/dL (19 Oct 2019 12:06)    I&O's Summary      PHYSICAL EXAM:  GENERAL: NAD, well-developed  HEAD:  Atraumatic, Normocephalic  EYES: EOMI, PERRLA, conjunctiva and sclera clear  NECK: Supple, No JVD  CHEST/LUNG: Clear to auscultation bilaterally; No wheeze  HEART: Regular rate and rhythm; No murmurs, rubs, or gallops  ABDOMEN: Soft, Nontender, Nondistended; Bowel sounds present  EXTREMITIES:  2+ Peripheral Pulses, No clubbing, cyanosis, or edema  PSYCH: AAOx3  NEUROLOGY: non-focal  SKIN: No rashes or lesions    LABS:                        12.5   19.86 )-----------( 204      ( 20 Oct 2019 06:10 )             35.5     10-20    126<L>  |  94<L>  |  20  ----------------------------<  231<H>  3.6   |  21<L>  |  1.08    Ca    8.0<L>      20 Oct 2019 06:10  Phos  2.3     10-20  Mg     1.8     10-20    TPro  6.5  /  Alb  3.5  /  TBili  0.5  /  DBili  x   /  AST  15  /  ALT  18  /  AlkPhos  77  10-18          Urinalysis Basic - ( 19 Oct 2019 00:55 )    Color: LIGHT YELLOW / Appearance: CLEAR / S.029 / pH: 6.5  Gluc: NEGATIVE / Ketone: NEGATIVE  / Bili: NEGATIVE / Urobili: NORMAL   Blood: NEGATIVE / Protein: NEGATIVE / Nitrite: NEGATIVE   Leuk Esterase: NEGATIVE / RBC: x / WBC x   Sq Epi: x / Non Sq Epi: x / Bacteria: x        RADIOLOGY & ADDITIONAL TESTS:    Imaging Personally Reviewed:    Consultant(s) Notes Reviewed:      Care Discussed with Consultants/Other Providers: Patient is a 69y old  Male who presents with a chief complaint of facial edema and pain (20 Oct 2019 09:09)    SUBJECTIVE / OVERNIGHT EVENTS: Patient feels worsening nasal congestion which made it difficult to sleep. Overall feeling very fatigued.     MEDICATIONS  (STANDING):  amLODIPine   Tablet 5 milliGRAM(s) Oral daily  aspirin enteric coated 81 milliGRAM(s) Oral daily  dextrose 5%. 1000 milliLiter(s) (50 mL/Hr) IV Continuous <Continuous>  dextrose 50% Injectable 12.5 Gram(s) IV Push once  dextrose 50% Injectable 25 Gram(s) IV Push once  dextrose 50% Injectable 25 Gram(s) IV Push once  enoxaparin Injectable 40 milliGRAM(s) SubCutaneous daily  insulin glargine Injectable (LANTUS) 6 Unit(s) SubCutaneous at bedtime  insulin lispro (HumaLOG) corrective regimen sliding scale   SubCutaneous three times a day before meals  insulin lispro (HumaLOG) corrective regimen sliding scale   SubCutaneous at bedtime  lactobacillus acidophilus 1 Tablet(s) Oral daily  piperacillin/tazobactam IVPB.. 3.375 Gram(s) IV Intermittent every 8 hours  potassium acid phosphate/sodium acid phosphate tablet (K-PHOS No. 2) 1 Tablet(s) Oral four times a day with meals  vancomycin  IVPB 1000 milliGRAM(s) IV Intermittent every 12 hours  vancomycin  IVPB        MEDICATIONS  (PRN):  dextrose 40% Gel 15 Gram(s) Oral once PRN Blood Glucose LESS THAN 70 milliGRAM(s)/deciliter  glucagon  Injectable 1 milliGRAM(s) IntraMuscular once PRN Glucose LESS THAN 70 milligrams/deciliter  sodium chloride 0.65% Nasal 1 Spray(s) Both Nostrils two times a day PRN Nasal Congestion    Vital Signs Last 24 Hrs  T(C): 37.4 (20 Oct 2019 06:00), Max: 37.4 (19 Oct 2019 21:53)  T(F): 99.3 (20 Oct 2019 06:00), Max: 99.4 (19 Oct 2019 21:53)  HR: 87 (20 Oct 2019 06:00) (84 - 94)  BP: 141/64 (20 Oct 2019 06:00) (128/59 - 141/64)  RR: 19 (20 Oct 2019 06:00) (18 - 19)  SpO2: 99% (20 Oct 2019 06:00) (96% - 100%)  CAPILLARY BLOOD GLUCOSE      POCT Blood Glucose.: 310 mg/dL (20 Oct 2019 08:41)  POCT Blood Glucose.: 116 mg/dL (19 Oct 2019 22:13)  POCT Blood Glucose.: 239 mg/dL (19 Oct 2019 17:20)  POCT Blood Glucose.: 139 mg/dL (19 Oct 2019 12:06)    I&O's Summary      PHYSICAL EXAM:  General: Patient lying in bed, sleepy appearing, but alert to voice commands, in no acute distress  HEENT: congested b/l nasal turbinates  CV: regular rate/rhythm, no murmur/gallop/rubs  Pulm: normal respiratory effort, no use of accessory muscles, lungs clear to auscultation bilaterally, no wheezes/crackles   Abd: soft, nondistended, normal active bowel sounds   Extremities: no edema bilateral lower extremities, palpable bilateral DP pulses.   Psych: normal mood and affect, follows commands  Skin: no rashes       LABS:                        12.5   19.86 )-----------( 204      ( 20 Oct 2019 06:10 )             35.5     10-20    126<L>  |  94<L>  |  20  ----------------------------<  231<H>  3.6   |  21<L>  |  1.08    Ca    8.0<L>      20 Oct 2019 06:10  Phos  2.3     10-20  Mg     1.8     10-20    TPro  6.5  /  Alb  3.5  /  TBili  0.5  /  DBili  x   /  AST  15  /  ALT  18  /  AlkPhos  77  10-18          Urinalysis Basic - ( 19 Oct 2019 00:55 )    Color: LIGHT YELLOW / Appearance: CLEAR / S.029 / pH: 6.5  Gluc: NEGATIVE / Ketone: NEGATIVE  / Bili: NEGATIVE / Urobili: NORMAL   Blood: NEGATIVE / Protein: NEGATIVE / Nitrite: NEGATIVE   Leuk Esterase: NEGATIVE / RBC: x / WBC x   Sq Epi: x / Non Sq Epi: x / Bacteria: x Patient is a 69y old  Male who presents with a chief complaint of facial edema and pain (20 Oct 2019 09:09)    SUBJECTIVE / OVERNIGHT EVENTS: Patient feels worsening nasal congestion which made it difficult to sleep. Overall feeling very fatigued. no F/C, CP, SOB, abn pain, N/V/D/C, change in urinary freq. tolerating diet.      MEDICATIONS  (STANDING):  amLODIPine   Tablet 5 milliGRAM(s) Oral daily  aspirin enteric coated 81 milliGRAM(s) Oral daily  dextrose 5%. 1000 milliLiter(s) (50 mL/Hr) IV Continuous <Continuous>  dextrose 50% Injectable 12.5 Gram(s) IV Push once  dextrose 50% Injectable 25 Gram(s) IV Push once  dextrose 50% Injectable 25 Gram(s) IV Push once  enoxaparin Injectable 40 milliGRAM(s) SubCutaneous daily  insulin glargine Injectable (LANTUS) 6 Unit(s) SubCutaneous at bedtime  insulin lispro (HumaLOG) corrective regimen sliding scale   SubCutaneous three times a day before meals  insulin lispro (HumaLOG) corrective regimen sliding scale   SubCutaneous at bedtime  lactobacillus acidophilus 1 Tablet(s) Oral daily  piperacillin/tazobactam IVPB.. 3.375 Gram(s) IV Intermittent every 8 hours  potassium acid phosphate/sodium acid phosphate tablet (K-PHOS No. 2) 1 Tablet(s) Oral four times a day with meals  vancomycin  IVPB 1000 milliGRAM(s) IV Intermittent every 12 hours  vancomycin  IVPB        MEDICATIONS  (PRN):  dextrose 40% Gel 15 Gram(s) Oral once PRN Blood Glucose LESS THAN 70 milliGRAM(s)/deciliter  glucagon  Injectable 1 milliGRAM(s) IntraMuscular once PRN Glucose LESS THAN 70 milligrams/deciliter  sodium chloride 0.65% Nasal 1 Spray(s) Both Nostrils two times a day PRN Nasal Congestion    Vital Signs Last 24 Hrs  T(C): 37.4 (20 Oct 2019 06:00), Max: 37.4 (19 Oct 2019 21:53)  T(F): 99.3 (20 Oct 2019 06:00), Max: 99.4 (19 Oct 2019 21:53)  HR: 87 (20 Oct 2019 06:00) (84 - 94)  BP: 141/64 (20 Oct 2019 06:00) (128/59 - 141/64)  RR: 19 (20 Oct 2019 06:00) (18 - 19)  SpO2: 99% (20 Oct 2019 06:00) (96% - 100%)  CAPILLARY BLOOD GLUCOSE      POCT Blood Glucose.: 310 mg/dL (20 Oct 2019 08:41)  POCT Blood Glucose.: 116 mg/dL (19 Oct 2019 22:13)  POCT Blood Glucose.: 239 mg/dL (19 Oct 2019 17:20)  POCT Blood Glucose.: 139 mg/dL (19 Oct 2019 12:06)    I&O's Summary      PHYSICAL EXAM:  General: Patient lying in bed, sleepy appearing, but alert to voice commands, in no acute distress  HEENT: congested b/l nasal turbinates  CV: regular rate/rhythm, no murmur/gallop/rubs  Pulm: normal respiratory effort, no use of accessory muscles, lungs clear to auscultation bilaterally, no wheezes/crackles   Abd: soft, nondistended, normal active bowel sounds   Extremities: no edema bilateral lower extremities, palpable bilateral DP pulses.   Psych: normal mood and affect, follows commands  Skin: no rashes       LABS:                        12.5   19.86 )-----------( 204      ( 20 Oct 2019 06:10 )             35.5     10-20    126<L>  |  94<L>  |  20  ----------------------------<  231<H>  3.6   |  21<L>  |  1.08    Ca    8.0<L>      20 Oct 2019 06:10  Phos  2.3     10-20  Mg     1.8     10-20    TPro  6.5  /  Alb  3.5  /  TBili  0.5  /  DBili  x   /  AST  15  /  ALT  18  /  AlkPhos  77  10-18          Urinalysis Basic - ( 19 Oct 2019 00:55 )    Color: LIGHT YELLOW / Appearance: CLEAR / S.029 / pH: 6.5  Gluc: NEGATIVE / Ketone: NEGATIVE  / Bili: NEGATIVE / Urobili: NORMAL   Blood: NEGATIVE / Protein: NEGATIVE / Nitrite: NEGATIVE   Leuk Esterase: NEGATIVE / RBC: x / WBC x   Sq Epi: x / Non Sq Epi: x / Bacteria: x      consults:   discussed management plan with ENT

## 2019-10-20 NOTE — PROGRESS NOTE ADULT - PROBLEM SELECTOR PLAN 5
- generalized weakness that began with initiation of steroids 10 days ago   - hold steroids and statin to see if there is improvement in symptoms   - PT luis fernando - generalized weakness that began with initiation of steroids 10 days ago   - hold steroids and statin to see if there is improvement in symptoms   - PT eval: likely home with no needs

## 2019-10-20 NOTE — PROGRESS NOTE ADULT - PROBLEM SELECTOR PLAN 4
- s/p resection and radiation   - s/p ENT consultation, appreciate recs,   - pending CT to evaluate for tumor recurrence

## 2019-10-20 NOTE — CONSULT NOTE ADULT - ASSESSMENT
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 History obtained from patient and family members (daughter and wife) at bedside using . States that 10 days ago, he began feeling very weak. Five days ago, the patient developed a painful rash on his nose, which began to crust. The night prior to admission, the patient had extreme pain of the nose, with swelling below the eyes, with difficulty breathing (states its more due to nasal congestion). He also endorsed severe headache and abdominal cramps, as well as nausea without vomiting. Ten days ago, he was prescribed Dexamethasone 2mg twice daily for ear discomfort (from 10/6 - 10/15, hasnt been on it since), as well as a multivitamin. He denies taking Dexamethasone before that. He denies fevers, chest pain, neck or throat pain, dysphagia, diarrhea/constipation, urinary symptoms, numbness/weakness of the arms, legs or neck, or neck swelling. He denies any sick contacts or recent travel. At this time, patient still has nose pain and weakness, however his other symptoms have improved.     In the ED, the patient's VS: T 99.7, HR 84, /71, RR 18, and SpO2 99  Labs significant for leukocytosis to 19.26 with 16.53 Neutrophils, hyponatremia to 126, and Lactate 2.1 --> 1.7  Given Clindamycin in ED (19 Oct 2019 03:22)  ------------  - s/p with postop radiation completed 8/2019. Found to have   - CT Maxillofacial concerning for anterior nasal septal abscess  - ID consulted for abx recs  - Above HPI reviewed and reconciled with patient and family  - h/o URI symptoms in July 2019, got Zpak by PCP. 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  P/w weakness since 7 days; rash over nose since 5 days, N&V, sudden onset difficulty breathing and and facial swelling for 1 day. H/o Dexamethasone 2mg twice daily for ear discomfort (10/6- 10/15). Also h/o 10 pound weight loss since 2 months and URI symptoms in July.   In ER, Tmax 100.2 with leucocytosis. CT Chest showed no e/o SVC syndrome. Continued to have fevers, ENT following, CT Maxillofacial C+ showed anterior nasal septal abscess. ID consulted  ------------  Impetigo skin infection with anterior nasal septal abscess  - Cx from wound growing Staph aureus  - lower clinical suspicion for fungal infection - patient has not received chemotherapy, no prolonged courses of steroids. HbA1c was 7.1  - UA negative, CXR and CT Chest show no s/o consolidation  - CT scan also showed nonspecific rounded lytic defect involving the left mandibular alveolus in the molar region - unclear significance    RECOMMENDATIONS:  - continue Vancomycin 1g IV q12h. Please check Vancomycin trough before 4th sequential dose. 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  P/w weakness since 7 days; rash over nose since 5 days, N&V, sudden onset difficulty breathing and and facial swelling for 1 day. H/o Dexamethasone 2mg twice daily for ear discomfort (10/6- 10/15). Also h/o 10 pound weight loss since 2 months and URI symptoms in July.   In ER, Tmax 100.2 with leucocytosis. CT Chest showed no e/o SVC syndrome. Continued to have fevers, ENT following, CT Maxillofacial C+ showed anterior nasal septal abscess. ID consulted  ------------  Impetigo skin infection with anterior nasal septal abscess  - Cx from skin lesions growing Staph aureus  - Pt likely has bacetrial infection. Lower clinical suspicion for fungal infection - patient has not received chemotherapy or prolonged courses of steroids. HbA1c was 7.1  - UA negative, CXR and CT Chest show no s/o consolidation  - CT scan also showed nonspecific rounded lytic defect involving the left mandibular alveolus in the molar region - unclear significance    RECOMMENDATIONS:  - continue Vancomycin 1g IV q12h. Please check Vancomycin trough before 4th sequential dose. 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  P/w weakness since 7 days; rash over nose since 5 days, N&V, sudden onset difficulty breathing and and facial swelling for 1 day. H/o Dexamethasone 2mg twice daily for ear discomfort (10/6- 10/15). Also h/o 10 pound weight loss since 2 months and URI symptoms in July.   In ER, Tmax 100.2 with leucocytosis. CT Chest showed no e/o SVC syndrome. Continued to have fevers, ENT following, CT Maxillofacial C+ showed anterior nasal septal abscess. ID consulted  ------------  Impetigo skin infection with anterior nasal septal abscess  - Cx from skin lesions growing Staph aureus  - Pt likely has bacetrial infection. Lower clinical suspicion for fungal infection - patient has not received chemotherapy or prolonged courses of steroids. HbA1c was 7.1  - UA negative, CXR and CT Chest show no s/o consolidation  - CT scan also showed nonspecific rounded lytic defect involving the left mandibular alveolus in the molar region - unclear significance    RECOMMENDATIONS:  - continue Vancomycin 1g IV q12h. Please check Vancomycin trough before 4th sequential dose. Target trough levels 15-20  - continue Zosyn 3.375g IV q8h  - continue Mupirocin ointment for impetigo  - f/u with ENT for drainage of abscess  - consider Dental/OMFS evaluation in this admission for evaluation of defect in left mandibular molar seen on CT scan  - f/u admission blood cx, wound cx  Recs conveyed to primary team    TAMMIE Lee, MD  Fellow, Infectious Diseases  Pager: [phone]  After 5pm and on Weekends: Call [phone]

## 2019-10-20 NOTE — CONSULT NOTE ADULT - SUBJECTIVE AND OBJECTIVE BOX
Patient is a 69y old  Male who presents with a chief complaint of facial edema and pain (20 Oct 2019 09:25)    HPI:  70 y/o Wolof speaking M with hx of DM, HTN, HLD, L parotid tumor treated with surgery (2019) and radiation (last rad ), CAD s/p pacemaker and stent 8 years ago, and CKD presents with face swelling for 1 day and weakness for 1 week, prior to which he was in his usual state of health. History obtained from patient and family members (daughter and wife) at bedside using . States that 10 days ago, he began feeling very weak. Five days ago, the patient developed a painful rash on his nose, which began to crust. The night prior to admission, the patient had extreme pain of the nose, with swelling below the eyes, with difficulty breathing (states its more due to nasal congestion). He also endorsed severe headache and abdominal cramps, as well as nausea without vomiting. Ten days ago, he was prescribed Dexamethasone 2mg twice daily for ear discomfort (from 10/6 - 10/15, hasnt been on it since), as well as a multivitamin. He denies taking Dexamethasone before that. He denies fevers, chest pain, neck or throat pain, dysphagia, diarrhea/constipation, urinary symptoms, numbness/weakness of the arms, legs or neck, or neck swelling. He denies any sick contacts or recent travel. At this time, patient still has nose pain and weakness, however his other symptoms have improved.     In the ED, the patient's VS: T 99.7, HR 84, /71, RR 18, and SpO2 99  Labs significant for leukocytosis to 19.26 with 16.53 Neutrophils, hyponatremia to 126, and Lactate 2.1 --> 1.7  Given Clindamycin in ED (19 Oct 2019 03:22)  ------------  s/p left parotidectomy and selective neck dissection with cervicofacial flap on 2019 with postop radiation completed 2019.   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    prior hospital charts reviewed [  ]  primary team notes reviewed [  ]  other consultant notes reviewed [  ]    PAST MEDICAL & SURGICAL HISTORY:  Tumor of parotid gland: right side  Cardiac pacemaker:   Coronary artery disease: s/p angiogram with stent   H/O gastroesophageal reflux (GERD)  Kidney disease  DM (diabetes mellitus)  Hypertension, unspecified type  Gout  DM (diabetes mellitus): x 16 years ; fs  19 ;  Kidney disease  HLD (hyperlipidemia)  HTN (hypertension)  Tumor of parotid gland: s/p resection  Cataract: Excisiion right eye  H/O cardiac pacemaker:   History of cholecystectomy:     Allergies  No Known Allergies    ANTIMICROBIALS (past 90 days)  MEDICATIONS  (STANDING):  clindamycin IVPB   100 mL/Hr IV Intermittent (10-19-19 @ 13:23)   100 mL/Hr IV Intermittent (10-19-19 @ 06:20)    clindamycin IVPB   100 mL/Hr IV Intermittent (10-19-19 @ 01:52)    piperacillin/tazobactam IVPB.   200 mL/Hr IV Intermittent (10-19-19 @ 13:57)    piperacillin/tazobactam IVPB..   25 mL/Hr IV Intermittent (10-20-19 @ 14:18)   25 mL/Hr IV Intermittent (10-20-19 @ 06:58)   25 mL/Hr IV Intermittent (10-19-19 @ 21:56)    vancomycin  IVPB   250 mL/Hr IV Intermittent (10-19-19 @ 16:33)    vancomycin  IVPB   250 mL/Hr IV Intermittent (10-20-19 @ 06:00)      ANTIMICROBIALS:    piperacillin/tazobactam IVPB.. 3.375 every 8 hours  vancomycin  IVPB 1000 every 12 hours  vancomycin  IVPB      OTHER MEDS: MEDICATIONS  (STANDING):  amLODIPine   Tablet 5 daily  aspirin enteric coated 81 daily  dextrose 40% Gel 15 once PRN  dextrose 50% Injectable 12.5 once  dextrose 50% Injectable 25 once  dextrose 50% Injectable 25 once  enoxaparin Injectable 40 daily  glucagon  Injectable 1 once PRN  insulin glargine Injectable (LANTUS) 6 at bedtime  insulin lispro (HumaLOG) corrective regimen sliding scale  three times a day before meals  insulin lispro (HumaLOG) corrective regimen sliding scale  at bedtime    SOCIAL HISTORY:   hx smoking  non-smoker    FAMILY HISTORY:  Family history of oral cancer: brother     REVIEW OF SYSTEMS  [  ] ROS unobtainable because:    [  ] All other systems negative except as noted below:	    Constitutional:  [ ] fever [ ] chills  [ ] weight loss  [ ] weakness  Skin:  [ ] rash [ ] phlebitis	  Eyes: [ ] icterus [ ] pain  [ ] discharge	  ENMT: [ ] sore throat  [ ] thrush [ ] ulcers [ ] exudates  Respiratory: [ ] dyspnea [ ] hemoptysis [ ] cough [ ] sputum	  Cardiovascular:  [ ] chest pain [ ] palpitations [ ] edema	  Gastrointestinal:  [ ] nausea [ ] vomiting [ ] diarrhea [ ] constipation [ ] pain	  Genitourinary:  [ ] dysuria [ ] frequency [ ] hematuria [ ] discharge [ ] flank pain  [ ] incontinence  Musculoskeletal:  [ ] myalgias [ ] arthralgias [ ] arthritis  [ ] back pain  Neurological:  [ ] headache [ ] seizures  [ ] confusion/altered mental status  Psychiatric:  [ ] anxiety [ ] depression	  Hematology/Lymphatics:  [ ] lymphadenopathy  Endocrine:  [ ] adrenal [ ] thyroid  Allergic/Immunologic:	 [ ] transplant [ ] seasonal    Vital Signs Last 24 Hrs  T(F): 101.8 (10-20-19 @ 14:15), Max: 101.8 (10-20-19 @ 14:15)  Vital Signs Last 24 Hrs  HR: 94 (10-20-19 @ 14:11) (84 - 94)  BP: 139/61 (10-20-19 @ 14:11) (128/59 - 141/64)  RR: 20 (10-20-19 @ 14:11)  SpO2: 100% (10-20-19 @ 14:11) (96% - 100%)  Wt(kg): --    EXAM:  Constitutional: Not in acute distress  Eyes: pupils bilaterally reactive to light. No icterus.  Oral cavity: Clear, no lesions  Neck: No neck vein distension noted  RS: Chest clear to auscultation bilaterally. No wheeze/rhonchi/crepitations.  CVS: S1, S2 heard. Regular rate and rhythm. No murmurs/rubs/gallops.  Abdomen: Soft. No guarding/rigidity/tenderness.  : No acute abnormalities  Extremities: Warm. No pedal edema  Skin: No lesions noted  Vascular: No evidence of phlebitis  Neuro: Alert, oriented to time/place/person                          12.5   19.86 )-----------( 204      ( 20 Oct 2019 06:10 )             35.5     10-20    126<L>  |  94<L>  |  20  ----------------------------<  231<H>  3.6   |  21<L>  |  1.08    Ca    8.0<L>      20 Oct 2019 06:10  Phos  2.3     10-20  Mg     1.8     10-20    TPro  6.5  /  Alb  3.5  /  TBili  0.5  /  DBili  x   /  AST  15  /  ALT  18  /  AlkPhos  77  10-18    Urinalysis Basic - ( 19 Oct 2019 00:55 )    Color: LIGHT YELLOW / Appearance: CLEAR / S.029 / pH: 6.5  Gluc: NEGATIVE / Ketone: NEGATIVE  / Bili: NEGATIVE / Urobili: NORMAL   Blood: NEGATIVE / Protein: NEGATIVE / Nitrite: NEGATIVE   Leuk Esterase: NEGATIVE / RBC: x / WBC x   Sq Epi: x / Non Sq Epi: x / Bacteria: x    MICROBIOLOGY:  Culture - Urine (collected 20 Oct 2019 08:48)  Source: URINE MIDSTREAM  Final Report (20 Oct 2019 08:56):    NO GROWTH AT 24 HOURS    Culture - Wound (collected 19 Oct 2019 08:24)  Source: OTHER  Preliminary Report (20 Oct 2019 08:05):    MODERATE    STAU^Staphylococcus aureus                    RADIOLOGY:  imaging below personally reviewed  < from: Xray Chest 2 Views PA/Lat (10.19.19 @ 00:07) >  No emergent findings.    < end of copied text >    < from: CT Maxillofacial w/ IV Cont (10.20.19 @ 11:58) >  A large anterior nasal septal abscess is noted as described.    Evolving postoperative changes are again noted status post resection of   left parotid mass, with associated surgical clips. The nonspecific soft   tissue within the operative bed is similar compared to the postoperative  2019 PET/CT study.    There has been interval increase in size of the bilateral level 1B   cervical lymph nodes. Although these could reflect reactive nodes,   pathologic nodes can't be excluded.    A nonspecific rounded lytic defect involving the left mandibular alveolus   in the molar region is stable compared to the prior study. Correlate for   dental extraction tooth socket, osteomyelitis, or underlying mass.    < end of copied text >      OTHER TESTS:  < from: CT Chest w/ IV Cont (10.18.19 @ 23:06) >  No evidence of superior vena cava obstruction.    < end of copied text > [employer]  used (#[id number]), later son acting as   =====  Patient is a 69y old  Male who presents with a chief complaint of facial edema and pain (20 Oct 2019 09:25)    HPI:  68 y/o Turkmen speaking M with hx of DM, HTN, HLD, L parotid tumor treated with surgery (2019) and radiation (last rad ), CAD s/p pacemaker and stent 8 years ago, and CKD presents with face swelling for 1 day and weakness for 1 week, prior to which he was in his usual state of health. History obtained from patient and family members (daughter and wife) at bedside using . States that 10 days ago, he began feeling very weak. Five days ago, the patient developed a painful rash on his nose, which began to crust. The night prior to admission, the patient had extreme pain of the nose, with swelling below the eyes, with difficulty breathing (states its more due to nasal congestion). He also endorsed severe headache and abdominal cramps, as well as nausea without vomiting. Ten days ago, he was prescribed Dexamethasone 2mg twice daily for ear discomfort (from 10/6 - 10/15, hasnt been on it since), as well as a multivitamin. He denies taking Dexamethasone before that. He denies fevers, chest pain, neck or throat pain, dysphagia, diarrhea/constipation, urinary symptoms, numbness/weakness of the arms, legs or neck, or neck swelling. He denies any sick contacts or recent travel. At this time, patient still has nose pain and weakness, however his other symptoms have improved.     In the ED, the patient's VS: T 99.7, HR 84, /71, RR 18, and SpO2 99  Labs significant for leukocytosis to 19.26 with 16.53 Neutrophils, hyponatremia to 126, and Lactate 2.1 --> 1.7  Given Clindamycin in ED (19 Oct 2019 03:22)  ------------  - s/p left parotidectomy and selective neck dissection with cervicofacial flap on 2019 with postop radiation completed 2019. 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 Recent use of Dexamethasone 2mg PO BID  - h/o 10 pounds weight loss since last RT (Aug 2019)  - Denied rash, cough, coryza, dysuria, loose stools, recent travel, sick contacts    primary team notes reviewed [x]  other consultant notes reviewed [x]    PAST MEDICAL & SURGICAL HISTORY:  Tumor of parotid gland: right side  Cardiac pacemaker:   Coronary artery disease: s/p angiogram with stent   H/O gastroesophageal reflux (GERD)  Kidney disease  DM (diabetes mellitus)  Hypertension, unspecified type  Gout  DM (diabetes mellitus): x 16 years ; fs  19 ;  Kidney disease  HLD (hyperlipidemia)  HTN (hypertension)  Tumor of parotid gland: s/p resection  Cataract: Excisiion right eye  H/O cardiac pacemaker:   History of cholecystectomy:     Allergies  No Known Allergies    ANTIMICROBIALS (past 90 days)  MEDICATIONS  (STANDING):  clindamycin IVPB   100 mL/Hr IV Intermittent (10-19-19 @ 13:23)   100 mL/Hr IV Intermittent (10-19-19 @ 06:20)    clindamycin IVPB   100 mL/Hr IV Intermittent (10-19-19 @ 01:52)    piperacillin/tazobactam IVPB.   200 mL/Hr IV Intermittent (10-19-19 @ 13:57)    piperacillin/tazobactam IVPB..   25 mL/Hr IV Intermittent (10-20-19 @ 14:18)   25 mL/Hr IV Intermittent (10-20-19 @ 06:58)   25 mL/Hr IV Intermittent (10-19-19 @ 21:56)    vancomycin  IVPB   250 mL/Hr IV Intermittent (10-19-19 @ 16:33)    vancomycin  IVPB   250 mL/Hr IV Intermittent (10-20-19 @ 06:00)      ANTIMICROBIALS:    piperacillin/tazobactam IVPB.. 3.375 every 8 hours  vancomycin  IVPB 1000 every 12 hours  vancomycin  IVPB      OTHER MEDS: MEDICATIONS  (STANDING):  amLODIPine   Tablet 5 daily  aspirin enteric coated 81 daily  dextrose 40% Gel 15 once PRN  dextrose 50% Injectable 12.5 once  dextrose 50% Injectable 25 once  dextrose 50% Injectable 25 once  enoxaparin Injectable 40 daily  glucagon  Injectable 1 once PRN  insulin glargine Injectable (LANTUS) 6 at bedtime  insulin lispro (HumaLOG) corrective regimen sliding scale  three times a day before meals  insulin lispro (HumaLOG) corrective regimen sliding scale  at bedtime    SOCIAL HISTORY:  - retired banker  - 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 Lived in [place] from  then back to [facility] . Migrated to [place] in 2018  - denied smoking/alcohol/recreational drug use    FAMILY HISTORY:  Family history of oral cancer: brother     REVIEW OF SYSTEMS  [  ] ROS unobtainable because:    [x]All other systems negative except as noted below:	  Constitutional:  [x] fever [x] chills  [x] weight loss  [x] weakness  Skin:  [ ] rash [ ] phlebitis	  Eyes: [ ] icterus [ ] pain  [ ] discharge	  ENMT: [ ] sore throat  [ ] thrush [ ] ulcers [ ] exudates  Respiratory: [ ] dyspnea [ ] hemoptysis [ ] cough [ ] sputum	  Cardiovascular:  [ ] chest pain [ ] palpitations [ ] edema	  Gastrointestinal:  [ ] nausea [ ] vomiting [ ] diarrhea [ ] constipation [ ] pain	  Genitourinary:  [ ] dysuria [ ] frequency [ ] hematuria [ ] discharge [ ] flank pain  [ ] incontinence  Musculoskeletal:  [ ] myalgias [ ] arthralgias [ ] arthritis  [ ] back pain  Neurological:  [ ] headache [ ] seizures  [ ] confusion/altered mental status  Psychiatric:  [ ] anxiety [ ] depression	  Hematology/Lymphatics:  [ ] lymphadenopathy  Endocrine:  [ ] adrenal [ ] thyroid  Allergic/Immunologic:	 [ ] transplant [ ] seasonal    Vital Signs Last 24 Hrs  T(F): 101.8 (10-20-19 @ 14:15), Max: 101.8 (10-20-19 @ 14:15)  Vital Signs Last 24 Hrs  HR: 94 (10-20-19 @ 14:11) (84 - 94)  BP: 139/61 (10-20-19 @ 14:11) (128/59 - 141/64)  RR: 20 (10-20-19 @ 14:11)  SpO2: 100% (10-20-19 @ 14:11) (96% - 100%)  Wt(kg): --    EXAM:  Constitutional: Not in acute distress  Eyes: pupils bilaterally reactive to light. No icterus.  Oral cavity: Clear, no lesions  Nose: B/l whitish nasal discharge noted  Neck: No neck vein distension noted  RS: Chest clear to auscultation bilaterally. No wheeze/rhonchi/crepitations.  CVS: S1, S2 heard. Regular rate and rhythm. No murmurs/rubs/gallops.  Abdomen: Soft. No guarding/rigidity/tenderness.  : No acute abnormalities  Extremities: Warm. No pedal edema  Skin: Impetigo skin lesions noted over bridge of nose  Vascular: No evidence of phlebitis  Neuro: Alert, oriented to time/place/person                          12.5   19.86 )-----------( 204      ( 20 Oct 2019 06:10 )             35.5     10-20    126<L>  |  94<L>  |  20  ----------------------------<  231<H>  3.6   |  21<L>  |  1.08    Ca    8.0<L>      20 Oct 2019 06:10  Phos  2.3     10-20  Mg     1.8     10-20    TPro  6.5  /  Alb  3.5  /  TBili  0.5  /  DBili  x   /  AST  15  /  ALT  18  /  AlkPhos  77  10-18    Urinalysis Basic - ( 19 Oct 2019 00:55 )    Color: LIGHT YELLOW / Appearance: CLEAR / S.029 / pH: 6.5  Gluc: NEGATIVE / Ketone: NEGATIVE  / Bili: NEGATIVE / Urobili: NORMAL   Blood: NEGATIVE / Protein: NEGATIVE / Nitrite: NEGATIVE   Leuk Esterase: NEGATIVE / RBC: x / WBC x   Sq Epi: x / Non Sq Epi: x / Bacteria: x    MICROBIOLOGY:  Culture - Urine (collected 20 Oct 2019 08:48)  Source: URINE MIDSTREAM  Final Report (20 Oct 2019 08:56):    NO GROWTH AT 24 HOURS    Culture - Wound (collected 19 Oct 2019 08:24)  Source: OTHER  Preliminary Report (20 Oct 2019 08:05):    MODERATE    STAU^Staphylococcus aureus    RADIOLOGY:  imaging below personally reviewed  < from: Xray Chest 2 Views PA/Lat (10.19.19 @ 00:07) >  No emergent findings.    < end of copied text >    < from: CT Maxillofacial w/ IV Cont (10.20.19 @ 11:58) >  A large anterior nasal septal abscess is noted as described.    Evolving postoperative changes are again noted status post resection of   left parotid mass, with associated surgical clips. The nonspecific soft   tissue within the operative bed is similar compared to the postoperative  2019 PET/CT study.    There has been interval increase in size of the bilateral level 1B   cervical lymph nodes. Although these could reflect reactive nodes,   pathologic nodes can't be excluded.    A nonspecific rounded lytic defect involving the left mandibular alveolus   in the molar region is stable compared to the prior study. Correlate for   dental extraction tooth socket, osteomyelitis, or underlying mass.    < end of copied text >      OTHER TESTS:  < from: CT Chest w/ IV Cont (10.18.19 @ 23:06) >  No evidence of superior vena cava obstruction.    < end of copied text > [employer]  used (#[id number]), later son acting as   =====  Patient is a 69y old  Male who presents with a chief complaint of facial edema and pain (20 Oct 2019 09:25)    HPI:  68 y/o Yi speaking M with hx of DM, HTN, HLD, L parotid tumor treated with surgery (2019) and radiation (last rad ), CAD s/p pacemaker and stent 8 years ago, and CKD presents with face swelling for 1 day and weakness for 1 week, prior to which he was in his usual state of health. History obtained from patient and family members (daughter and wife) at bedside using . States that 10 days ago, he began feeling very weak. Five days ago, the patient developed a painful rash on his nose, which began to crust. The night prior to admission, the patient had extreme pain of the nose, with swelling below the eyes, with difficulty breathing (states its more due to nasal congestion). He also endorsed severe headache and abdominal cramps, as well as nausea without vomiting. Ten days ago, he was prescribed Dexamethasone 2mg twice daily for ear discomfort (from 10/6 - 10/15, hasnt been on it since), as well as a multivitamin. He denies taking Dexamethasone before that. He denies fevers, chest pain, neck or throat pain, dysphagia, diarrhea/constipation, urinary symptoms, numbness/weakness of the arms, legs or neck, or neck swelling. He denies any sick contacts or recent travel. At this time, patient still has nose pain and weakness, however his other symptoms have improved.     In the ED, the patient's VS: T 99.7, HR 84, /71, RR 18, and SpO2 99  Labs significant for leukocytosis to 19.26 with 16.53 Neutrophils, hyponatremia to 126, and Lactate 2.1 --> 1.7  Given Clindamycin in ED (19 Oct 2019 03:22)  ------------  - s/p left parotidectomy and selective neck dissection with cervicofacial flap on 2019 with postop radiation completed 2019. 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 Recent use of Dexamethasone 2mg PO BID  - h/o 10 pounds weight loss since last RT (Aug 2019)  - Denied rash, cough, coryza, dysuria, loose stools, recent travel, sick contacts    primary team notes reviewed [x]  other consultant notes reviewed [x]    PAST MEDICAL & SURGICAL HISTORY:  Tumor of parotid gland: right side  Cardiac pacemaker:   Coronary artery disease: s/p angiogram with stent   H/O gastroesophageal reflux (GERD)  Kidney disease  DM (diabetes mellitus)  Hypertension, unspecified type  Gout  DM (diabetes mellitus): x 16 years ; fs  19 ;  Kidney disease  HLD (hyperlipidemia)  HTN (hypertension)  Tumor of parotid gland: s/p resection  Cataract: Excisiion right eye  H/O cardiac pacemaker:   History of cholecystectomy:     Allergies  No Known Allergies    ANTIMICROBIALS (past 90 days)  MEDICATIONS  (STANDING):  clindamycin IVPB   100 mL/Hr IV Intermittent (10-19-19 @ 13:23)   100 mL/Hr IV Intermittent (10-19-19 @ 06:20)    clindamycin IVPB   100 mL/Hr IV Intermittent (10-19-19 @ 01:52)    piperacillin/tazobactam IVPB.   200 mL/Hr IV Intermittent (10-19-19 @ 13:57)    piperacillin/tazobactam IVPB..   25 mL/Hr IV Intermittent (10-20-19 @ 14:18)   25 mL/Hr IV Intermittent (10-20-19 @ 06:58)   25 mL/Hr IV Intermittent (10-19-19 @ 21:56)    vancomycin  IVPB   250 mL/Hr IV Intermittent (10-19-19 @ 16:33)    vancomycin  IVPB   250 mL/Hr IV Intermittent (10-20-19 @ 06:00)      ANTIMICROBIALS:    piperacillin/tazobactam IVPB.. 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 Lived in [place] from  then back to [facility] . Migrated to [place] in 2018  - denied smoking/alcohol/recreational drug use    FAMILY HISTORY:  Family history of oral cancer: brother     REVIEW OF SYSTEMS  [  ] ROS unobtainable because:    [x]All other systems negative except as noted below:	  Constitutional:  [x] fever [x] chills  [x] weight loss  [x] weakness  Skin:  [ ] rash [ ] phlebitis	  Eyes: [ ] icterus [ ] pain  [ ] discharge	  ENMT: [ ] sore throat  [ ] thrush [ ] ulcers [ ] exudates  Respiratory: [ ] dyspnea [ ] hemoptysis [ ] cough [ ] sputum	  Cardiovascular:  [ ] chest pain [ ] palpitations [ ] edema	  Gastrointestinal:  [ ] nausea [ ] vomiting [ ] diarrhea [ ] constipation [ ] pain	  Genitourinary:  [ ] dysuria [ ] frequency [ ] hematuria [ ] discharge [ ] flank pain  [ ] incontinence  Musculoskeletal:  [ ] myalgias [ ] arthralgias [ ] arthritis  [ ] back pain  Neurological:  [ ] headache [ ] seizures  [ ] confusion/altered mental status  Psychiatric:  [ ] anxiety [ ] depression	  Hematology/Lymphatics:  [ ] lymphadenopathy  Endocrine:  [ ] adrenal [ ] thyroid  Allergic/Immunologic:	 [ ] transplant [ ] seasonal    Vital Signs Last 24 Hrs  T(F): 101.8 (10-20-19 @ 14:15), Max: 101.8 (10-20-19 @ 14:15)  Vital Signs Last 24 Hrs  HR: 94 (10-20-19 @ 14:11) (84 - 94)  BP: 139/61 (10-20-19 @ 14:11) (128/59 - 141/64)  RR: 20 (10-20-19 @ 14:11)  SpO2: 100% (10-20-19 @ 14:11) (96% - 100%)  Wt(kg): --    EXAM:  Constitutional: Not in acute distress  Eyes: pupils bilaterally reactive to light. No icterus.  Oral cavity: Clear, no lesions  Nose: B/l whitish nasal discharge noted  Neck: No neck vein distension noted  RS: Chest clear to auscultation bilaterally. No wheeze/rhonchi/crepitations.  CVS: S1, S2 heard. Regular rate and rhythm. No murmurs/rubs/gallops.  Abdomen: Soft. No guarding/rigidity/tenderness.  : No acute abnormalities  Extremities: Warm. No pedal edema  Skin: Impetigo skin lesions noted over bridge of nose  Vascular: No evidence of phlebitis  Neuro: Alert, oriented to time/place/person                          12.5   19.86 )-----------( 204      ( 20 Oct 2019 06:10 )             35.5     10-20    126<L>  |  94<L>  |  20  ----------------------------<  231<H>  3.6   |  21<L>  |  1.08    Ca    8.0<L>      20 Oct 2019 06:10  Phos  2.3     10-20  Mg     1.8     10-20    TPro  6.5  /  Alb  3.5  /  TBili  0.5  /  DBili  x   /  AST  15  /  ALT  18  /  AlkPhos  77  10-18    Urinalysis Basic - ( 19 Oct 2019 00:55 )    Color: LIGHT YELLOW / Appearance: CLEAR / S.029 / pH: 6.5  Gluc: NEGATIVE / Ketone: NEGATIVE  / Bili: NEGATIVE / Urobili: NORMAL   Blood: NEGATIVE / Protein: NEGATIVE / Nitrite: NEGATIVE   Leuk Esterase: NEGATIVE / RBC: x / WBC x   Sq Epi: x / Non Sq Epi: x / Bacteria: x    MICROBIOLOGY:  Culture - Urine (collected 20 Oct 2019 08:48)  Source: URINE MIDSTREAM  Final Report (20 Oct 2019 08:56):    NO GROWTH AT 24 HOURS    Culture - Wound (collected 19 Oct 2019 08:24)  Source: OTHER  Preliminary Report (20 Oct 2019 08:05):    MODERATE    STAU^Staphylococcus aureus    RADIOLOGY:  imaging below personally reviewed  Xray Chest 2 Views PA/Lat (10.19.19 @ 00:07)   No emergent findings.    CT Maxillofacial w/ IV Cont (10.20.19 @ 11:58)   A large anterior nasal septal abscess is noted as described.  Evolving postoperative changes are again noted status post resection of   left parotid mass, with associated surgical clips. The nonspecific soft   tissue within the operative bed is similar compared to the postoperative  2019 PET/CT study.  There has been interval increase in size of the bilateral level 1B   cervical lymph nodes. Although these could reflect reactive nodes, pathologic nodes can't be excluded.  A nonspecific rounded lytic defect involving the left mandibular alveolus   in the molar region is stable compared to the prior study. Correlate for   dental extraction tooth socket, osteomyelitis, or underlying mass.    CT Chest w/ IV Cont (10.18.19 @ 23:06)   No evidence of superior vena cava obstruction.

## 2019-10-20 NOTE — PHYSICAL THERAPY INITIAL EVALUATION ADULT - ADDITIONAL COMMENTS
As per pt. request, Son provided  services throughout therapy session.     Pt. lives in a private home with his family. as per son, Pt. ambulates "Little" at home however, when he ambulates, pt. ambulates independently with a straight cane and is also independent with transfers. Pt. left supine in bed with all tubes/lines intact, call bell in reach and in NAD.

## 2019-10-20 NOTE — PHYSICAL THERAPY INITIAL EVALUATION ADULT - DISCHARGE DISPOSITION, PT EVAL
anticipated discharge to home with no skilled restorative physical therapy services upon discharge from [facility]. Please follow therapy for continued assessment.

## 2019-10-20 NOTE — PROGRESS NOTE ADULT - PROBLEM SELECTOR PLAN 2
-likely 2/2 inc nasal congestion  -Subjectively reports improved breathing. On exam, normal respiratory effort, no use of accessory muscles, lungs clear on ausculation, no wheezes.   -Currently satting well on RA  -continue to monitor off O2, treatment of sinusitis as stated above -likely 2/2 inc nasal congestion  -Subjectively reports improved breathing. On exam, normal respiratory effort, no use of accessory muscles, lungs clear on ausculation, no wheezes.   -Currently satting well on RA  -continue to monitor off O2, treatment of sinusitis as stated above  -afrin BID x3 days per ENT

## 2019-10-20 NOTE — PROGRESS NOTE ADULT - PROBLEM SELECTOR PLAN 3
-painful, crusted lesions on nose in setting of recent steroid use  -s/p Clindamycin initially and broadened to vanc, zosyn. c/w abx -painful, crusted lesions on nose in setting of recent steroid use  -s/p Clindamycin initially and broadened to vanc, zosyn. c/w abx  -does not appear to be consistent with zoster as lesions on both sides of nose

## 2019-10-20 NOTE — CONSULT NOTE ADULT - ATTENDING COMMENTS
Kole Toro MD   Infectious Disease   Pager [phone]   After 5PM and on weekends please page fellow on call or call [phone]

## 2019-10-20 NOTE — PROGRESS NOTE ADULT - PROBLEM SELECTOR PLAN 7
- A1C 7.1%  - increase to lantus 6 units at bedtime (on 13 units at home)  - Low dose sliding scale corrective insulin TID with meals/ bed time  - POC finger stick TID pre-meal/bedtime - A1C 7.1%  - FS uncontrolled, increase to lantus 6 units at bedtime (on 13 units at home)  - Low dose sliding scale corrective insulin TID with meals/ bed time  - POC finger stick TID pre-meal/bedtime

## 2019-10-20 NOTE — PHYSICAL THERAPY INITIAL EVALUATION ADULT - PATIENT PROFILE REVIEW, REHAB EVAL
Pt. profile reviewed, consulted with RN Clay SHRESTHA prior to initial PT evaluation and tx, as per RN, Pt. is OK to participate in skilled therapy session/yes

## 2019-10-20 NOTE — PROGRESS NOTE ADULT - PROBLEM SELECTOR PLAN 1
- SIRS positive with with HR>90 and leukocytosis (although leukocytosis may be from recent steroid use) 2/2 cellulitis vs sinusitis? abx broadened to vanc and zosyn   - blood and wound cultures sent   - unlikely to be angioedema, considering the lack of fever/nausea/vomiting/wheezes/respiratory difficulties or other systemic signs/symptoms, also patient has been on ACEi for a long time.   - CT chest ruled out SVC syndrome   - s/p ENT consult, appreciate recs   - f/u CT maxillofacial  - consider ID consult pending CT maxillofacial results - meets sepsis criteria with with HR>90 and leukocytosis (although leukocytosis may be from recent steroid use) 2/2 cellulitis vs sinusitis? abx broadened to vanc and zosyn   - blood and wound cultures sent   - unlikely to be angioedema, considering the lack of fever/nausea/vomiting/wheezes/respiratory difficulties or other systemic signs/symptoms, also patient has been on ACEi for a long time.   - CT chest ruled out SVC syndrome   - s/p ENT consult, appreciate recs   - f/u CT maxillofacial  - consider ID consult pending CT maxillofacial results

## 2019-10-20 NOTE — PROGRESS NOTE ADULT - ASSESSMENT
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

## 2019-10-20 NOTE — PROGRESS NOTE ADULT - SUBJECTIVE AND OBJECTIVE BOX
Pt seen and examined at bedside. Wife and son at bedside. Pt still has significant pain overlying nose. Per wife facial swelling has improved, but he's now complaining that he has nasal obstruction bilaterally.    Tm 100.3 Pt seen and examined at bedside. Wife and son at bedside. Pt still has significant pain overlying nose. Per wife facial swelling has improved, but he's now complaining that he has nasal obstruction bilaterally.    Tm 100.3  NAD, awake and alert  Breathing comfortably on RA  Voice normal  Face: swelling on right improved from yesterday  Nose: mil-moderate swelling and induration, mucoid/purulent fluid debrided from anterior nasal cavity, small area of fluctuant palpable at right nasal septum  OC/OP: poor dentition  Neck soft/flat    Procedure: Incision and Drainage of right nasal abscess  Approx 2cc of 1% lidocaine with epinephrine 1:100,000 injected in right nasal septum area.  Small incision made with #11 scalpel. Approximately 0.5cc pus drained, culture sent                          12.5   19.86 )-----------( 204      ( 20 Oct 2019 06:10 )             35.5     10-20    126<L>  |  94<L>  |  20  ----------------------------<  231<H>  3.6   |  21<L>  |  1.08    Ca    8.0<L>      20 Oct 2019 06:10  Phos  2.3     10-20  Mg     1.8     10-20    TPro  6.5  /  Alb  3.5  /  TBili  0.5  /  DBili  x   /  AST  15  /  ALT  18  /  AlkPhos  77  10-18    A/P 70 y/o Azeri speaking M with hx of DM, HTN, HLD, L parotid tumor s/p L parotid, SND 2-4, cervicofacial flap 4/17, CAD s/p pacemaker and stent 8 years ago, and CKD now with infection of nose/face, concern for nasovestibular abscess. S/p I&D of right septum with minimal pus.   -CT max face with contrast to evaluate for an underlying abscess  -continue vanc/zosyn  -f/u culture  -low suspicion for recurrent tumor  -facial swelling likely related to infection, low suspicion for allergic reaction/angioedema  -will f/u imaging  -will discuss with attending and update with recs Pt seen and examined at bedside. Wife and son at bedside. Pt still has significant pain overlying nose. Per wife facial swelling has improved, but he's now complaining that he has nasal obstruction bilaterally.    Tm 100.3  NAD, awake and alert  Breathing comfortably on RA  Voice normal  Face: swelling on right improved from yesterday  Nose: mil-moderate swelling and induration, mucoid/purulent fluid debrided from anterior nasal cavity, small area of fluctuant palpable at right nasal septum  OC/OP: poor dentition  Neck soft/flat    Procedure: Incision and Drainage of right nasal abscess  Approx 2cc of 1% lidocaine with epinephrine 1:100,000 injected in right nasal septum area.  Small incision made with #11 scalpel. Approximately 0.5cc pus drained, culture sent                          12.5   19.86 )-----------( 204      ( 20 Oct 2019 06:10 )             35.5     10-20    126<L>  |  94<L>  |  20  ----------------------------<  231<H>  3.6   |  21<L>  |  1.08    Ca    8.0<L>      20 Oct 2019 06:10  Phos  2.3     10-20  Mg     1.8     10-20    TPro  6.5  /  Alb  3.5  /  TBili  0.5  /  DBili  x   /  AST  15  /  ALT  18  /  AlkPhos  77  10-18    A/P 68 y/o Arabic speaking M with hx of DM, HTN, HLD, L parotid tumor s/p L parotid, SND 2-4, cervicofacial flap 4/17, CAD s/p pacemaker and stent 8 years ago, and CKD now with infection of nose/face, concern for nasovestibular abscess. S/p I&D of right septum with minimal pus.   -CT max face with contrast to evaluate for an underlying abscess  -continue vanc/zosyn  -f/u culture  -afrin bid x 3 days max  -nasal saline sprays q4h while awake  -low suspicion for recurrent tumor  -facial swelling likely related to infection, low suspicion for allergic reaction/angioedema  -will f/u imaging  -will discuss with attending and update with recs

## 2019-10-21 LAB
ANION GAP SERPL CALC-SCNC: 12 MMO/L — SIGNIFICANT CHANGE UP (ref 7–14)
ANION GAP SERPL CALC-SCNC: 13 MMO/L — SIGNIFICANT CHANGE UP (ref 7–14)
BUN SERPL-MCNC: 18 MG/DL — SIGNIFICANT CHANGE UP (ref 7–23)
BUN SERPL-MCNC: 20 MG/DL — SIGNIFICANT CHANGE UP (ref 7–23)
CALCIUM SERPL-MCNC: 7.8 MG/DL — LOW (ref 8.4–10.5)
CALCIUM SERPL-MCNC: 8.4 MG/DL — SIGNIFICANT CHANGE UP (ref 8.4–10.5)
CHLORIDE SERPL-SCNC: 93 MMOL/L — LOW (ref 98–107)
CHLORIDE SERPL-SCNC: 96 MMOL/L — LOW (ref 98–107)
CO2 SERPL-SCNC: 21 MMOL/L — LOW (ref 22–31)
CO2 SERPL-SCNC: 22 MMOL/L — SIGNIFICANT CHANGE UP (ref 22–31)
CREAT SERPL-MCNC: 1.06 MG/DL — SIGNIFICANT CHANGE UP (ref 0.5–1.3)
CREAT SERPL-MCNC: 1.25 MG/DL — SIGNIFICANT CHANGE UP (ref 0.5–1.3)
GLUCOSE SERPL-MCNC: 249 MG/DL — HIGH (ref 70–99)
GLUCOSE SERPL-MCNC: 261 MG/DL — HIGH (ref 70–99)
HCT VFR BLD CALC: 37.6 % — LOW (ref 39–50)
HGB BLD-MCNC: 12.5 G/DL — LOW (ref 13–17)
MAGNESIUM SERPL-MCNC: 2 MG/DL — SIGNIFICANT CHANGE UP (ref 1.6–2.6)
MCHC RBC-ENTMCNC: 29.5 PG — SIGNIFICANT CHANGE UP (ref 27–34)
MCHC RBC-ENTMCNC: 33.2 % — SIGNIFICANT CHANGE UP (ref 32–36)
MCV RBC AUTO: 88.7 FL — SIGNIFICANT CHANGE UP (ref 80–100)
NRBC # FLD: 0 K/UL — SIGNIFICANT CHANGE UP (ref 0–0)
PHOSPHATE SERPL-MCNC: 3.6 MG/DL — SIGNIFICANT CHANGE UP (ref 2.5–4.5)
PLATELET # BLD AUTO: 205 K/UL — SIGNIFICANT CHANGE UP (ref 150–400)
PMV BLD: 10.2 FL — SIGNIFICANT CHANGE UP (ref 7–13)
POTASSIUM SERPL-MCNC: 3.8 MMOL/L — SIGNIFICANT CHANGE UP (ref 3.5–5.3)
POTASSIUM SERPL-MCNC: 3.9 MMOL/L — SIGNIFICANT CHANGE UP (ref 3.5–5.3)
POTASSIUM SERPL-SCNC: 3.8 MMOL/L — SIGNIFICANT CHANGE UP (ref 3.5–5.3)
POTASSIUM SERPL-SCNC: 3.9 MMOL/L — SIGNIFICANT CHANGE UP (ref 3.5–5.3)
RBC # BLD: 4.24 M/UL — SIGNIFICANT CHANGE UP (ref 4.2–5.8)
RBC # FLD: 12.6 % — SIGNIFICANT CHANGE UP (ref 10.3–14.5)
SODIUM SERPL-SCNC: 127 MMOL/L — LOW (ref 135–145)
SODIUM SERPL-SCNC: 130 MMOL/L — LOW (ref 135–145)
SPECIMEN SOURCE: SIGNIFICANT CHANGE UP
VANCOMYCIN TROUGH SERPL-MCNC: 15.4 UG/ML — SIGNIFICANT CHANGE UP (ref 10–20)
WBC # BLD: 18.66 K/UL — HIGH (ref 3.8–10.5)
WBC # FLD AUTO: 18.66 K/UL — HIGH (ref 3.8–10.5)

## 2019-10-21 PROCEDURE — 99233 SBSQ HOSP IP/OBS HIGH 50: CPT | Mod: GC

## 2019-10-21 RX ADMIN — Medication 1 SPRAY(S): at 13:00

## 2019-10-21 RX ADMIN — PIPERACILLIN AND TAZOBACTAM 25 GRAM(S): 4; .5 INJECTION, POWDER, LYOPHILIZED, FOR SOLUTION INTRAVENOUS at 07:19

## 2019-10-21 RX ADMIN — Medication 1 SPRAY(S): at 10:58

## 2019-10-21 RX ADMIN — MUPIROCIN 1 APPLICATION(S): 20 OINTMENT TOPICAL at 00:02

## 2019-10-21 RX ADMIN — Medication 250 MILLIGRAM(S): at 06:38

## 2019-10-21 RX ADMIN — Medication 2: at 08:55

## 2019-10-21 RX ADMIN — OXYMETAZOLINE HYDROCHLORIDE 1 SPRAY(S): 0.5 SPRAY NASAL at 18:07

## 2019-10-21 RX ADMIN — Medication 1 SPRAY(S): at 06:39

## 2019-10-21 RX ADMIN — Medication 1 SPRAY(S): at 22:42

## 2019-10-21 RX ADMIN — Medication 2: at 12:58

## 2019-10-21 RX ADMIN — Medication 1 SPRAY(S): at 02:03

## 2019-10-21 RX ADMIN — Medication 3: at 18:06

## 2019-10-21 RX ADMIN — Medication 250 MILLIGRAM(S): at 18:17

## 2019-10-21 RX ADMIN — OXYMETAZOLINE HYDROCHLORIDE 1 SPRAY(S): 0.5 SPRAY NASAL at 06:39

## 2019-10-21 RX ADMIN — Medication 1 SPRAY(S): at 18:07

## 2019-10-21 RX ADMIN — Medication 81 MILLIGRAM(S): at 12:59

## 2019-10-21 RX ADMIN — AMLODIPINE BESYLATE 5 MILLIGRAM(S): 2.5 TABLET ORAL at 06:39

## 2019-10-21 RX ADMIN — MUPIROCIN 1 APPLICATION(S): 20 OINTMENT TOPICAL at 06:40

## 2019-10-21 RX ADMIN — INSULIN GLARGINE 6 UNIT(S): 100 INJECTION, SOLUTION SUBCUTANEOUS at 22:44

## 2019-10-21 RX ADMIN — Medication 1 TABLET(S): at 12:59

## 2019-10-21 RX ADMIN — MUPIROCIN 1 APPLICATION(S): 20 OINTMENT TOPICAL at 18:06

## 2019-10-21 RX ADMIN — SODIUM CHLORIDE 1 GRAM(S): 9 INJECTION INTRAMUSCULAR; INTRAVENOUS; SUBCUTANEOUS at 06:39

## 2019-10-21 RX ADMIN — ENOXAPARIN SODIUM 40 MILLIGRAM(S): 100 INJECTION SUBCUTANEOUS at 12:59

## 2019-10-21 NOTE — PROGRESS NOTE ADULT - PROBLEM SELECTOR PLAN 3
-painful, crusted lesions on nose in setting of recent steroid use  -s/p Clindamycin initially and broadened to vanc, zosyn. c/w abx  -does not appear to be consistent with zoster as lesions on both sides of nose -likely 2/2 inc nasal congestion  -Subjectively reports improved breathing. On exam, normal respiratory effort, no use of accessory muscles, lungs clear on ausculation, no wheezes.   -Currently satting well on RA  -continue to monitor off O2, treatment of sinusitis as stated above  -afrin BID x3 days per ENT

## 2019-10-21 NOTE — PROGRESS NOTE ADULT - ATTENDING COMMENTS
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 Pt admitted for Sepsis 2/2 nasal abscess and sinusitis.    Sepsis due to sinusitis and abscess ( fever, tachycardia and leukocytosis) not present on admission, pt infection progressed. c/w empiric ABx, ID consult, I&D per ENT. c/w monitoring. Given the lytic bone lesion on mandible witll get dental eval ? OM vs cancer. will f/u    Hyponatremia: likely due to SIADH in the setting of parotid tumor, salt tabs and fluid restriction, Trend Na    L parotid tumor: s/p ENT eval, ENT/Onc f/u outpt

## 2019-10-21 NOTE — PROGRESS NOTE ADULT - ASSESSMENT
70 y/o Polish speaking M with hx of DM, HTN, HLD, L parotid tumor treated with surgery (April 2019) and radiation (last rad August 16), CAD s/p pacemaker and stent 8 years ago, and CKD2 presents with face swelling in the setting of recent steroid use, crusted lesions on nose, and weakness, a/f further w/u and management of impetigo and now septic 2/2 cellulitis vs sinusitis, pending CT maxillofacial. 68 y/o Luxembourgish speaking M with hx of DM, HTN, HLD, L parotid tumor treated with surgery (April 2019) and radiation (last rad August 16), CAD s/p pacemaker and stent 8 years ago, and CKD2 presents with face swelling in the setting of recent steroid use, crusted lesions on nose, and weakness, CT maxillofacial showed abscess involving nasal septum, measuring 3.7 cm x 1.8 cm x 3.1 cm, extends into the lateral nasal cartilage, s/p I & D by ENT.

## 2019-10-21 NOTE — PROCEDURE NOTE - NSANESTHESIA_GEN_A_CORE
with EPI/8cc injected total: infraorabital nerve block and supratrochlea block bilaterally, injection into nasal tip/1% lidocaine

## 2019-10-21 NOTE — PROGRESS NOTE ADULT - PROBLEM SELECTOR PLAN 6
Na initially improved after IVF, now trend down after IVF discontinued   - check urine lytes, urine osm, serum osm   - encourage PO intake - generalized weakness that began with initiation of steroids 10 days ago   - hold steroids and statin to see if there is improvement in symptoms   - PT eval: likely home with no needs

## 2019-10-21 NOTE — PROGRESS NOTE ADULT - PROBLEM SELECTOR PLAN 7
- A1C 7.1%  - FS uncontrolled, increase to lantus 6 units at bedtime (on 13 units at home)  - Low dose sliding scale corrective insulin TID with meals/ bed time  - POC finger stick TID pre-meal/bedtime

## 2019-10-21 NOTE — PROGRESS NOTE ADULT - PROBLEM SELECTOR PLAN 2
-likely 2/2 inc nasal congestion  -Subjectively reports improved breathing. On exam, normal respiratory effort, no use of accessory muscles, lungs clear on ausculation, no wheezes.   -Currently satting well on RA  -continue to monitor off O2, treatment of sinusitis as stated above  -afrin BID x3 days per ENT Likely SIADH. sodium chloride 1g BID initiated yesterday  Na trend: 124--> 127    Plan:   - continue NaCl 1g BID   - continue monitoring BMP

## 2019-10-21 NOTE — PROGRESS NOTE ADULT - SUBJECTIVE AND OBJECTIVE BOX
Patient seen in PM and again this AM. No acute events overnight.  Patient reports mild interval improvement in nasal swelling  Endorses purulent drainage from nose  This AM packing placed in right septal incision, however, min purulence expressed    Vital Signs Last 24 Hrs  T(C): 37.3 (21 Oct 2019 06:36), Max: 38.8 (20 Oct 2019 14:15)  T(F): 99.2 (21 Oct 2019 06:36), Max: 101.8 (20 Oct 2019 14:15)  HR: 92 (21 Oct 2019 06:36) (77 - 94)  BP: 134/59 (21 Oct 2019 06:36) (122/65 - 139/61)  BP(mean): --  RR: 18 (21 Oct 2019 06:36) (17 - 20)  SpO2: 99% (21 Oct 2019 06:36) (97% - 100%)  NAD, awake and alert  EOM intact  Breathing comfortably on RA  Face: swelling on right improved from yesterday  Nose: moderate swelling and induration of the nasal tip with macerated skin, mucoid/purulent fluid debrided from anterior nasal cavity, right nasal septum with patent incision - packing placed, min purulence expressed from wound   OC/OP: poor dentition  Neck soft/flat    CT Max Face: 3.7x3.1x1.8 cm nasal abscess (septal/nasal tip)                                     12.5   18.66 )-----------( 205      ( 21 Oct 2019 04:55 )             37.6     10-21    127<L>  |  93<L>  |  20  ----------------------------<  261<H>  3.8   |  21<L>  |  1.25    Ca    7.8<L>      21 Oct 2019 02:00  Phos  3.6     10-21  Mg     2.0     10-21    Cx: staph aureus    A/P 68 y/o Hebrew speaking M with hx of DM, HTN, HLD, L parotid tumor s/p L parotid, SND 2-4, cervicofacial flap 4/17, CAD s/p pacemaker and stent 8 years ago, and CKD now with infection of nose/face, now with nasal abscess s/p I&D of right septum with minimal pus. WCx staph aureus  -continue vanc/zosyn  -f/u culture  -strict glucose control, goal <150  -mupirocin to right nare BID  -warm compresses q4hr to nose  -afrin bid x 3 days max  -nasal saline sprays q4h while awake  -leave packing in place  -[facility] will follow closely and determine need for repeat I&D in PM  -d/w attending

## 2019-10-21 NOTE — PROGRESS NOTE ADULT - SUBJECTIVE AND OBJECTIVE BOX
Manuela Blum   PGY-1 Resident Physician   Pager [phone] from 7am to 7pm, after 7pm page night float     Patient is a 69y old  Male who presents with a chief complaint of facial edema and pain (21 Oct 2019 08:08)      SUBJECTIVE / OVERNIGHT EVENTS:    MEDICATIONS  (STANDING):  amLODIPine   Tablet 5 milliGRAM(s) Oral daily  aspirin enteric coated 81 milliGRAM(s) Oral daily  dextrose 5%. 1000 milliLiter(s) (50 mL/Hr) IV Continuous <Continuous>  dextrose 50% Injectable 12.5 Gram(s) IV Push once  dextrose 50% Injectable 25 Gram(s) IV Push once  dextrose 50% Injectable 25 Gram(s) IV Push once  enoxaparin Injectable 40 milliGRAM(s) SubCutaneous daily  insulin glargine Injectable (LANTUS) 6 Unit(s) SubCutaneous at bedtime  insulin lispro (HumaLOG) corrective regimen sliding scale   SubCutaneous three times a day before meals  insulin lispro (HumaLOG) corrective regimen sliding scale   SubCutaneous at bedtime  lactobacillus acidophilus 1 Tablet(s) Oral daily  mupirocin 2% Ointment 1 Application(s) Topical two times a day  oxymetazoline 0.05% Nasal Spray 1 Spray(s) Both Nostrils two times a day  piperacillin/tazobactam IVPB.. 3.375 Gram(s) IV Intermittent every 8 hours  sodium chloride 1 Gram(s) Oral two times a day  sodium chloride 0.65% Nasal 1 Spray(s) Both Nostrils every 4 hours  vancomycin  IVPB 1000 milliGRAM(s) IV Intermittent every 12 hours  vancomycin  IVPB        MEDICATIONS  (PRN):  dextrose 40% Gel 15 Gram(s) Oral once PRN Blood Glucose LESS THAN 70 milliGRAM(s)/deciliter  glucagon  Injectable 1 milliGRAM(s) IntraMuscular once PRN Glucose LESS THAN 70 milligrams/deciliter    Allergies  No Known Allergies        Vital Signs Last 24 Hrs  T(C): 37.3 (21 Oct 2019 06:36), Max: 38.8 (20 Oct 2019 14:15)  T(F): 99.2 (21 Oct 2019 06:36), Max: 101.8 (20 Oct 2019 14:15)  HR: 92 (21 Oct 2019 06:36) (77 - 94)  BP: 134/59 (21 Oct 2019 06:36) (122/65 - 139/61)  BP(mean): --  RR: 18 (21 Oct 2019 06:36) (17 - 20)  SpO2: 99% (21 Oct 2019 06:36) (97% - 100%)  Daily     Daily   CAPILLARY BLOOD GLUCOSE      POCT Blood Glucose.: 227 mg/dL (21 Oct 2019 08:20)  POCT Blood Glucose.: 268 mg/dL (20 Oct 2019 22:35)  POCT Blood Glucose.: 189 mg/dL (20 Oct 2019 17:22)  POCT Blood Glucose.: 285 mg/dL (20 Oct 2019 12:44)      PHYSICAL EXAM:    LABS:                        12.5   18.66 )-----------( 205      ( 21 Oct 2019 04:55 )             37.6     Hgb Trend: 12.5<--, 12.5<--, 13.8<--, 14.3<--  10-21    127<L>  |  93<L>  |  20  ----------------------------<  261<H>  3.8   |  21<L>  |  1.25    Ca    7.8<L>      21 Oct 2019 02:00  Phos  3.6     10-21  Mg     2.0     10-21      Creatinine Trend: 1.25<--, 1.15<--, 1.08<--, 0.90<--, 1.27<--        < from: CT Maxillofacial w/ IV Cont (10.20.19 @ 11:58) >  FINDINGS:    A large peripherally enhancing abscess collection containing air involves   the nasal septum, measuring 3.7 cm x 1.8 cm x 3.1 cm in size. The abscess   collection extends into the lateral nasal cartilages.     Evolving postoperative changes are again noted status post resection of   left parotid mass, with associated surgical clips. The nonspecific soft   tissue within the operative bed is similar compared to the postoperative   05/28/2019 PET/CT study.    There has been a substantial interval increase in size of a right level   1B lymph node which now measures 1.7 x 0.9 cm. Nonspecific left level 1B   lymph nodes have also increased in size, currently measuring up to 1 cm.   Left neck dissection changes are partially visualized.    A nonspecific rounded lytic defect involving the left mandibular alveolus   in the molar region is stable compared to the prior study. Correlate for   dental extraction tooth socket, osteomyelitis, or underlying mass.    A large periapical lucency in the right posterior mandibular molar tooth   region is stable.    Moderate paranasal sinus mucosal thickening. Partial opacification of the   left mastoid air cells.    Status post right cataract surgery. The globes are otherwise symmetric in   size and contour. Extraocular muscles are not enlarged or deviated. No   radiopaque orbital foreign bodies are visualized. The retrobulbar fat is   preserved.    IMPRESSION:     A large anterior nasal septal abscess is noted as described.    Evolving postoperative changes are again noted status post resection of   left parotid mass, with associated surgical clips. The nonspecific soft   tissue within the operative bed is similar compared to the postoperative  05/28/2019 PET/CT study.    There has been interval increase in size of the bilateral level 1B   cervical lymph nodes. Although these could reflect reactive nodes,   pathologic nodes can't be excluded.    A nonspecific rounded lytic defect involving the left mandibular alveolus   in the molar region is stable compared to the prior study. Correlate for   dental extraction tooth socket, osteomyelitis, or underlying mass.      < end of copied text > Manuela Kulwant   PGY-1 Resident Physician   Pager [phone] from 7am to 7pm, after 7pm page night float     Patient is a 69y old  Male who presents with a chief complaint of facial edema and pain (21 Oct 2019 08:08)    SUBJECTIVE / OVERNIGHT EVENTS: No acute event overnight. This morning, patient comfortable, reports difficulty with breathing mainly due to nose pain, denies chest pain/shortness of breath/abdominal pain.     MEDICATIONS  (STANDING):  amLODIPine   Tablet 5 milliGRAM(s) Oral daily  aspirin enteric coated 81 milliGRAM(s) Oral daily  dextrose 5%. 1000 milliLiter(s) (50 mL/Hr) IV Continuous <Continuous>  dextrose 50% Injectable 12.5 Gram(s) IV Push once  dextrose 50% Injectable 25 Gram(s) IV Push once  dextrose 50% Injectable 25 Gram(s) IV Push once  enoxaparin Injectable 40 milliGRAM(s) SubCutaneous daily  insulin glargine Injectable (LANTUS) 6 Unit(s) SubCutaneous at bedtime  insulin lispro (HumaLOG) corrective regimen sliding scale   SubCutaneous three times a day before meals  insulin lispro (HumaLOG) corrective regimen sliding scale   SubCutaneous at bedtime  lactobacillus acidophilus 1 Tablet(s) Oral daily  mupirocin 2% Ointment 1 Application(s) Topical two times a day  oxymetazoline 0.05% Nasal Spray 1 Spray(s) Both Nostrils two times a day  piperacillin/tazobactam IVPB.. 3.375 Gram(s) IV Intermittent every 8 hours  sodium chloride 1 Gram(s) Oral two times a day  sodium chloride 0.65% Nasal 1 Spray(s) Both Nostrils every 4 hours  vancomycin  IVPB 1000 milliGRAM(s) IV Intermittent every 12 hours  vancomycin  IVPB        MEDICATIONS  (PRN):  dextrose 40% Gel 15 Gram(s) Oral once PRN Blood Glucose LESS THAN 70 milliGRAM(s)/deciliter  glucagon  Injectable 1 milliGRAM(s) IntraMuscular once PRN Glucose LESS THAN 70 milligrams/deciliter    Allergies  No Known Allergies    Vital Signs Last 24 Hrs  T(C): 37.3 (21 Oct 2019 06:36), Max: 38.8 (20 Oct 2019 14:15)  T(F): 99.2 (21 Oct 2019 06:36), Max: 101.8 (20 Oct 2019 14:15)  HR: 92 (21 Oct 2019 06:36) (77 - 94)  BP: 134/59 (21 Oct 2019 06:36) (122/65 - 139/61)  BP(mean): --  RR: 18 (21 Oct 2019 06:36) (17 - 20)  SpO2: 99% (21 Oct 2019 06:36) (97% - 100%)  Daily     Daily   CAPILLARY BLOOD GLUCOSE      POCT Blood Glucose.: 227 mg/dL (21 Oct 2019 08:20)  POCT Blood Glucose.: 268 mg/dL (20 Oct 2019 22:35)  POCT Blood Glucose.: 189 mg/dL (20 Oct 2019 17:22)  POCT Blood Glucose.: 285 mg/dL (20 Oct 2019 12:44)      PHYSICAL EXAM:  General: patient lying in bed, comfortable, in no acute distress  HEENT:   CV: regular rate/ rhythm, no murmur/gallop/rubs  Pulm: lungs clear to ausculation bilaterally, no wheezes/crackles  Abd: soft, nondistended, not tender to palpation in all 4 quadrants, normal active bowel sounds   LABS:                        12.5   18.66 )-----------( 205      ( 21 Oct 2019 04:55 )             37.6     Hgb Trend: 12.5<--, 12.5<--, 13.8<--, 14.3<--  10-21    127<L>  |  93<L>  |  20  ----------------------------<  261<H>  3.8   |  21<L>  |  1.25    Ca    7.8<L>      21 Oct 2019 02:00  Phos  3.6     10-21  Mg     2.0     10-21      Creatinine Trend: 1.25<--, 1.15<--, 1.08<--, 0.90<--, 1.27<--        < from: CT Maxillofacial w/ IV Cont (10.20.19 @ 11:58) >  FINDINGS:    A large peripherally enhancing abscess collection containing air involves   the nasal septum, measuring 3.7 cm x 1.8 cm x 3.1 cm in size. The abscess   collection extends into the lateral nasal cartilages.     Evolving postoperative changes are again noted status post resection of   left parotid mass, with associated surgical clips. The nonspecific soft   tissue within the operative bed is similar compared to the postoperative   05/28/2019 PET/CT study.    There has been a substantial interval increase in size of a right level   1B lymph node which now measures 1.7 x 0.9 cm. Nonspecific left level 1B   lymph nodes have also increased in size, currently measuring up to 1 cm.   Left neck dissection changes are partially visualized.    A nonspecific rounded lytic defect involving the left mandibular alveolus   in the molar region is stable compared to the prior study. Correlate for   dental extraction tooth socket, osteomyelitis, or underlying mass.    A large periapical lucency in the right posterior mandibular molar tooth   region is stable.    Moderate paranasal sinus mucosal thickening. Partial opacification of the   left mastoid air cells.    Status post right cataract surgery. The globes are otherwise symmetric in   size and contour. Extraocular muscles are not enlarged or deviated. No   radiopaque orbital foreign bodies are visualized. The retrobulbar fat is   preserved.    IMPRESSION:     A large anterior nasal septal abscess is noted as described.    Evolving postoperative changes are again noted status post resection of   left parotid mass, with associated surgical clips. The nonspecific soft   tissue within the operative bed is similar compared to the postoperative  05/28/2019 PET/CT study.    There has been interval increase in size of the bilateral level 1B   cervical lymph nodes. Although these could reflect reactive nodes,   pathologic nodes can't be excluded.    A nonspecific rounded lytic defect involving the left mandibular alveolus   in the molar region is stable compared to the prior study. Correlate for   dental extraction tooth socket, osteomyelitis, or underlying mass.      < end of copied text > Manuela Kulwant   PGY-1 Resident Physician   Pager [phone]/ [phone] from 7am to 7pm, after 7pm page night float     Patient is a 69y old  Male who presents with a chief complaint of facial edema and pain (21 Oct 2019 08:08)    SUBJECTIVE / OVERNIGHT EVENTS: No acute event overnight. This morning, patient comfortable, reports difficulty with breathing mainly due to nose pain, denies chest pain/shortness of breath/abdominal pain.     MEDICATIONS  (STANDING):  amLODIPine   Tablet 5 milliGRAM(s) Oral daily  aspirin enteric coated 81 milliGRAM(s) Oral daily  dextrose 5%. 1000 milliLiter(s) (50 mL/Hr) IV Continuous <Continuous>  dextrose 50% Injectable 12.5 Gram(s) IV Push once  dextrose 50% Injectable 25 Gram(s) IV Push once  dextrose 50% Injectable 25 Gram(s) IV Push once  enoxaparin Injectable 40 milliGRAM(s) SubCutaneous daily  insulin glargine Injectable (LANTUS) 6 Unit(s) SubCutaneous at bedtime  insulin lispro (HumaLOG) corrective regimen sliding scale   SubCutaneous three times a day before meals  insulin lispro (HumaLOG) corrective regimen sliding scale   SubCutaneous at bedtime  lactobacillus acidophilus 1 Tablet(s) Oral daily  mupirocin 2% Ointment 1 Application(s) Topical two times a day  oxymetazoline 0.05% Nasal Spray 1 Spray(s) Both Nostrils two times a day  piperacillin/tazobactam IVPB.. 3.375 Gram(s) IV Intermittent every 8 hours  sodium chloride 1 Gram(s) Oral two times a day  sodium chloride 0.65% Nasal 1 Spray(s) Both Nostrils every 4 hours  vancomycin  IVPB 1000 milliGRAM(s) IV Intermittent every 12 hours  vancomycin  IVPB        MEDICATIONS  (PRN):  dextrose 40% Gel 15 Gram(s) Oral once PRN Blood Glucose LESS THAN 70 milliGRAM(s)/deciliter  glucagon  Injectable 1 milliGRAM(s) IntraMuscular once PRN Glucose LESS THAN 70 milligrams/deciliter    Allergies  No Known Allergies    Vital Signs Last 24 Hrs  T(C): 37.3 (21 Oct 2019 06:36), Max: 38.8 (20 Oct 2019 14:15)  T(F): 99.2 (21 Oct 2019 06:36), Max: 101.8 (20 Oct 2019 14:15)  HR: 92 (21 Oct 2019 06:36) (77 - 94)  BP: 134/59 (21 Oct 2019 06:36) (122/65 - 139/61)  BP(mean): --  RR: 18 (21 Oct 2019 06:36) (17 - 20)  SpO2: 99% (21 Oct 2019 06:36) (97% - 100%)  Daily     Daily   CAPILLARY BLOOD GLUCOSE      POCT Blood Glucose.: 227 mg/dL (21 Oct 2019 08:20)  POCT Blood Glucose.: 268 mg/dL (20 Oct 2019 22:35)  POCT Blood Glucose.: 189 mg/dL (20 Oct 2019 17:22)  POCT Blood Glucose.: 285 mg/dL (20 Oct 2019 12:44)      PHYSICAL EXAM:  General: patient lying in bed, comfortable, in no acute distress  HEENT: R nose s/p I & D, s/p packing.   CV: regular rate/ rhythm, no murmur/gallop/rubs  Pulm: lungs clear to ausculation bilaterally, no wheezes/crackles  Abd: soft, nondistended, not tender to palpation in all 4 quadrants, normal active bowel sounds     LABS:                        12.5   18.66 )-----------( 205      ( 21 Oct 2019 04:55 )             37.6     Hgb Trend: 12.5<--, 12.5<--, 13.8<--, 14.3<--  10-21    127<L>  |  93<L>  |  20  ----------------------------<  261<H>  3.8   |  21<L>  |  1.25    Ca    7.8<L>      21 Oct 2019 02:00  Phos  3.6     10-21  Mg     2.0     10-21      Creatinine Trend: 1.25<--, 1.15<--, 1.08<--, 0.90<--, 1.27<--        < from: CT Maxillofacial w/ IV Cont (10.20.19 @ 11:58) >  FINDINGS:    A large peripherally enhancing abscess collection containing air involves   the nasal septum, measuring 3.7 cm x 1.8 cm x 3.1 cm in size. The abscess   collection extends into the lateral nasal cartilages.     Evolving postoperative changes are again noted status post resection of   left parotid mass, with associated surgical clips. The nonspecific soft   tissue within the operative bed is similar compared to the postoperative   05/28/2019 PET/CT study.    There has been a substantial interval increase in size of a right level   1B lymph node which now measures 1.7 x 0.9 cm. Nonspecific left level 1B   lymph nodes have also increased in size, currently measuring up to 1 cm.   Left neck dissection changes are partially visualized.    A nonspecific rounded lytic defect involving the left mandibular alveolus   in the molar region is stable compared to the prior study. Correlate for   dental extraction tooth socket, osteomyelitis, or underlying mass.    A large periapical lucency in the right posterior mandibular molar tooth   region is stable.    Moderate paranasal sinus mucosal thickening. Partial opacification of the   left mastoid air cells.    Status post right cataract surgery. The globes are otherwise symmetric in   size and contour. Extraocular muscles are not enlarged or deviated. No   radiopaque orbital foreign bodies are visualized. The retrobulbar fat is   preserved.    IMPRESSION:     A large anterior nasal septal abscess is noted as described.    Evolving postoperative changes are again noted status post resection of   left parotid mass, with associated surgical clips. The nonspecific soft   tissue within the operative bed is similar compared to the postoperative  05/28/2019 PET/CT study.    There has been interval increase in size of the bilateral level 1B   cervical lymph nodes. Although these could reflect reactive nodes,   pathologic nodes can't be excluded.    A nonspecific rounded lytic defect involving the left mandibular alveolus   in the molar region is stable compared to the prior study. Correlate for   dental extraction tooth socket, osteomyelitis, or underlying mass.      < end of copied text >

## 2019-10-21 NOTE — PROVIDER CONTACT NOTE (OTHER) - SITUATION
Patient has a temperature of 100.6, rectally
Patient has a temperature of 101, orally; 101.8, rectally
Pt's sodium is 127
Vanco trough is 15.4 and wnl
Patient complains of pain (7/10) in nasal region and requests tylenol

## 2019-10-21 NOTE — PROVIDER CONTACT NOTE (OTHER) - NAME OF MD/NP/PA/DO NOTIFIED:
Breanna Oleary, Team 4
Breanna Oleary, Team 4
Dr. Hightower, Team 4 pg. [id number]
Dr. Hightower, Team 4 pg. [id number]
Dr. Hightower, Team 4 pg.[phone]

## 2019-10-21 NOTE — PROGRESS NOTE ADULT - PROBLEM SELECTOR PLAN 1
- meets sepsis criteria with with HR>90 and leukocytosis (although leukocytosis may be from recent steroid use) 2/2 cellulitis vs sinusitis? abx broadened to vanc and zosyn   - blood and wound cultures sent   - unlikely to be angioedema, considering the lack of fever/nausea/vomiting/wheezes/respiratory difficulties or other systemic signs/symptoms, also patient has been on ACEi for a long time.   - CT chest ruled out SVC syndrome   - s/p ENT consult, appreciate recs   - f/u CT maxillofacial  - consider ID consult pending CT maxillofacial results - meets sepsis criteria with HR>90 and leukocytosis (although leukocytosis may be from recent steroid use).   - CT maxillofacial showed abscess involving nasal septum, measuring 3.7 cm x 1.8 cm x 3.1 cm, extends into the lateral nasal cartilage, s/p I & D by ENT.     Plan:   -    - unlikely to be angioedema, considering the lack of fever/nausea/vomiting/wheezes/respiratory difficulties or other systemic signs/symptoms, also patient has been on ACEi for a long time.   - CT chest ruled out SVC syndrome   - s/p ENT consult, appreciate recs - meets sepsis criteria with HR>90 and leukocytosis (although leukocytosis may be from recent steroid use).   -unlikely to be angioedema, considering the lack of fever/nausea/vomiting/wheezes/respiratory difficulties or other systemic signs/symptoms, also patient has been on ACEi for a long time.   - CT chest ruled out SVC syndrome   - CT maxillofacial showed abscess involving nasal septum, measuring 3.7 cm x 1.8 cm x 3.1 cm, extends into the lateral nasal cartilage, s/p I & D by ENT.     Plan:   -  continue vancomycin + zosyn  -  f/u blood culture  -  f/u wound culture, + moderate Staph aureus   -  ENT following, appreciate recs.

## 2019-10-21 NOTE — PROVIDER CONTACT NOTE (OTHER) - ASSESSMENT
Patient only experiences pain in the nasal region.
Patient is resting in bed; feels "cold". Covered with several blankets.
Patient is resting well in bed. Cultures are currently being drawn.
Pt is asymptomatic
Pt is asymptomatic

## 2019-10-21 NOTE — PROVIDER CONTACT NOTE (OTHER) - ACTION/TREATMENT ORDERED:
Will order pain med
Administer 1g Vancomycin
Next BMP to be done at  0800
Noted.
Noted; will review pt's chart

## 2019-10-21 NOTE — PROGRESS NOTE ADULT - PROBLEM SELECTOR PLAN 4
- s/p resection and radiation   - s/p ENT consultation, appreciate recs,   - pending CT to evaluate for tumor recurrence -painful, crusted lesions on nose in setting of recent steroid use  -s/p Clindamycin initially and broadened to vanc, zosyn. c/w abx  -does not appear to be consistent with zoster as lesions on both sides of nose

## 2019-10-21 NOTE — PROGRESS NOTE ADULT - PROBLEM SELECTOR PLAN 5
- generalized weakness that began with initiation of steroids 10 days ago   - hold steroids and statin to see if there is improvement in symptoms   - PT eval: likely home with no needs - s/p resection and radiation   - s/p ENT consultation, appreciate recs,   - pending CT to evaluate for tumor recurrence

## 2019-10-21 NOTE — PROVIDER CONTACT NOTE (OTHER) - BACKGROUND
Patient admitted for cellulitis of the face.
Patient admitted for cellulitis of the face.
Pt admitted for cellulitis
Pt admitted for cellulitis
Patient admitted for cellulitis of the face.

## 2019-10-22 ENCOUNTER — TRANSCRIPTION ENCOUNTER (OUTPATIENT)
Age: 69
End: 2019-10-22

## 2019-10-22 LAB
-  CEFAZOLIN: SIGNIFICANT CHANGE UP
-  CEFAZOLIN: SIGNIFICANT CHANGE UP
-  CIPROFLOXACIN: SIGNIFICANT CHANGE UP
-  CIPROFLOXACIN: SIGNIFICANT CHANGE UP
-  CLINDAMYCIN: SIGNIFICANT CHANGE UP
-  CLINDAMYCIN: SIGNIFICANT CHANGE UP
-  ERYTHROMYCIN: SIGNIFICANT CHANGE UP
-  ERYTHROMYCIN: SIGNIFICANT CHANGE UP
-  GENTAMICIN: SIGNIFICANT CHANGE UP
-  GENTAMICIN: SIGNIFICANT CHANGE UP
-  LEVOFLOXACIN: SIGNIFICANT CHANGE UP
-  LEVOFLOXACIN: SIGNIFICANT CHANGE UP
-  MOXIFLOXACIN(AEROBIC): SIGNIFICANT CHANGE UP
-  MOXIFLOXACIN(AEROBIC): SIGNIFICANT CHANGE UP
-  OXACILLIN: SIGNIFICANT CHANGE UP
-  OXACILLIN: SIGNIFICANT CHANGE UP
-  PENICILLIN: SIGNIFICANT CHANGE UP
-  PENICILLIN: SIGNIFICANT CHANGE UP
-  RIFAMPIN.: SIGNIFICANT CHANGE UP
-  RIFAMPIN.: SIGNIFICANT CHANGE UP
-  TETRACYCLINE: SIGNIFICANT CHANGE UP
-  TETRACYCLINE: SIGNIFICANT CHANGE UP
-  TRIMETHOPRIM/SULFAMETHOXAZOLE: SIGNIFICANT CHANGE UP
-  TRIMETHOPRIM/SULFAMETHOXAZOLE: SIGNIFICANT CHANGE UP
-  VANCOMYCIN: SIGNIFICANT CHANGE UP
-  VANCOMYCIN: SIGNIFICANT CHANGE UP
ANION GAP SERPL CALC-SCNC: 10 MMO/L — SIGNIFICANT CHANGE UP (ref 7–14)
ANION GAP SERPL CALC-SCNC: 11 MMO/L — SIGNIFICANT CHANGE UP (ref 7–14)
ANION GAP SERPL CALC-SCNC: 12 MMO/L — SIGNIFICANT CHANGE UP (ref 7–14)
BACTERIA WND CULT: SIGNIFICANT CHANGE UP
BACTERIA WND CULT: SIGNIFICANT CHANGE UP
BUN SERPL-MCNC: 14 MG/DL — SIGNIFICANT CHANGE UP (ref 7–23)
BUN SERPL-MCNC: 16 MG/DL — SIGNIFICANT CHANGE UP (ref 7–23)
BUN SERPL-MCNC: 16 MG/DL — SIGNIFICANT CHANGE UP (ref 7–23)
CALCIUM SERPL-MCNC: 8.3 MG/DL — LOW (ref 8.4–10.5)
CHLORIDE SERPL-SCNC: 93 MMOL/L — LOW (ref 98–107)
CHLORIDE SERPL-SCNC: 95 MMOL/L — LOW (ref 98–107)
CHLORIDE SERPL-SCNC: 95 MMOL/L — LOW (ref 98–107)
CO2 SERPL-SCNC: 21 MMOL/L — LOW (ref 22–31)
CO2 SERPL-SCNC: 21 MMOL/L — LOW (ref 22–31)
CO2 SERPL-SCNC: 23 MMOL/L — SIGNIFICANT CHANGE UP (ref 22–31)
CREAT SERPL-MCNC: 0.93 MG/DL — SIGNIFICANT CHANGE UP (ref 0.5–1.3)
CREAT SERPL-MCNC: 1.03 MG/DL — SIGNIFICANT CHANGE UP (ref 0.5–1.3)
CREAT SERPL-MCNC: 1.07 MG/DL — SIGNIFICANT CHANGE UP (ref 0.5–1.3)
GLUCOSE SERPL-MCNC: 159 MG/DL — HIGH (ref 70–99)
GLUCOSE SERPL-MCNC: 237 MG/DL — HIGH (ref 70–99)
GLUCOSE SERPL-MCNC: 278 MG/DL — HIGH (ref 70–99)
HCT VFR BLD CALC: 35.7 % — LOW (ref 39–50)
HGB BLD-MCNC: 12.4 G/DL — LOW (ref 13–17)
INR BLD: 1.17 — SIGNIFICANT CHANGE UP (ref 0.88–1.17)
MAGNESIUM SERPL-MCNC: 1.8 MG/DL — SIGNIFICANT CHANGE UP (ref 1.6–2.6)
MCHC RBC-ENTMCNC: 29.7 PG — SIGNIFICANT CHANGE UP (ref 27–34)
MCHC RBC-ENTMCNC: 34.7 % — SIGNIFICANT CHANGE UP (ref 32–36)
MCV RBC AUTO: 85.6 FL — SIGNIFICANT CHANGE UP (ref 80–100)
METHOD TYPE: SIGNIFICANT CHANGE UP
METHOD TYPE: SIGNIFICANT CHANGE UP
NRBC # FLD: 0 K/UL — SIGNIFICANT CHANGE UP (ref 0–0)
ORGANISM # SPEC MICROSCOPIC CNT: SIGNIFICANT CHANGE UP
PHOSPHATE SERPL-MCNC: 2.4 MG/DL — LOW (ref 2.5–4.5)
PLATELET # BLD AUTO: 213 K/UL — SIGNIFICANT CHANGE UP (ref 150–400)
PMV BLD: 9.7 FL — SIGNIFICANT CHANGE UP (ref 7–13)
POTASSIUM SERPL-MCNC: 3.9 MMOL/L — SIGNIFICANT CHANGE UP (ref 3.5–5.3)
POTASSIUM SERPL-MCNC: 4 MMOL/L — SIGNIFICANT CHANGE UP (ref 3.5–5.3)
POTASSIUM SERPL-MCNC: 4 MMOL/L — SIGNIFICANT CHANGE UP (ref 3.5–5.3)
POTASSIUM SERPL-SCNC: 3.9 MMOL/L — SIGNIFICANT CHANGE UP (ref 3.5–5.3)
POTASSIUM SERPL-SCNC: 4 MMOL/L — SIGNIFICANT CHANGE UP (ref 3.5–5.3)
POTASSIUM SERPL-SCNC: 4 MMOL/L — SIGNIFICANT CHANGE UP (ref 3.5–5.3)
PROTHROM AB SERPL-ACNC: 13 SEC — SIGNIFICANT CHANGE UP (ref 9.8–13.1)
RBC # BLD: 4.17 M/UL — LOW (ref 4.2–5.8)
RBC # FLD: 12.7 % — SIGNIFICANT CHANGE UP (ref 10.3–14.5)
SODIUM SERPL-SCNC: 126 MMOL/L — LOW (ref 135–145)
SODIUM SERPL-SCNC: 127 MMOL/L — LOW (ref 135–145)
SODIUM SERPL-SCNC: 128 MMOL/L — LOW (ref 135–145)
WBC # BLD: 15.62 K/UL — HIGH (ref 3.8–10.5)
WBC # FLD AUTO: 15.62 K/UL — HIGH (ref 3.8–10.5)

## 2019-10-22 PROCEDURE — 99233 SBSQ HOSP IP/OBS HIGH 50: CPT | Mod: GC

## 2019-10-22 PROCEDURE — 99232 SBSQ HOSP IP/OBS MODERATE 35: CPT

## 2019-10-22 RX ORDER — CEFAZOLIN SODIUM 1 G
1000 VIAL (EA) INJECTION EVERY 8 HOURS
Refills: 0 | Status: COMPLETED | OUTPATIENT
Start: 2019-10-22 | End: 2019-10-23

## 2019-10-22 RX ORDER — SODIUM CHLORIDE 9 MG/ML
1 INJECTION INTRAMUSCULAR; INTRAVENOUS; SUBCUTANEOUS DAILY
Refills: 0 | Status: DISCONTINUED | OUTPATIENT
Start: 2019-10-22 | End: 2019-10-24

## 2019-10-22 RX ORDER — INSULIN GLARGINE 100 [IU]/ML
9 INJECTION, SOLUTION SUBCUTANEOUS AT BEDTIME
Refills: 0 | Status: DISCONTINUED | OUTPATIENT
Start: 2019-10-22 | End: 2019-10-24

## 2019-10-22 RX ORDER — INSULIN LISPRO 100/ML
1 VIAL (ML) SUBCUTANEOUS
Refills: 0 | Status: DISCONTINUED | OUTPATIENT
Start: 2019-10-22 | End: 2019-10-23

## 2019-10-22 RX ADMIN — Medication 1 SPRAY(S): at 06:00

## 2019-10-22 RX ADMIN — Medication 1 TABLET(S): at 12:41

## 2019-10-22 RX ADMIN — INSULIN GLARGINE 9 UNIT(S): 100 INJECTION, SOLUTION SUBCUTANEOUS at 22:41

## 2019-10-22 RX ADMIN — Medication 1 SPRAY(S): at 13:02

## 2019-10-22 RX ADMIN — ENOXAPARIN SODIUM 40 MILLIGRAM(S): 100 INJECTION SUBCUTANEOUS at 12:41

## 2019-10-22 RX ADMIN — Medication 250 MILLIGRAM(S): at 04:40

## 2019-10-22 RX ADMIN — Medication 100 MILLIGRAM(S): at 22:11

## 2019-10-22 RX ADMIN — SODIUM CHLORIDE 1 GRAM(S): 9 INJECTION INTRAMUSCULAR; INTRAVENOUS; SUBCUTANEOUS at 17:30

## 2019-10-22 RX ADMIN — Medication 3: at 09:19

## 2019-10-22 RX ADMIN — OXYMETAZOLINE HYDROCHLORIDE 1 SPRAY(S): 0.5 SPRAY NASAL at 17:31

## 2019-10-22 RX ADMIN — Medication 1 UNIT(S): at 12:41

## 2019-10-22 RX ADMIN — Medication 1 SPRAY(S): at 09:19

## 2019-10-22 RX ADMIN — AMLODIPINE BESYLATE 5 MILLIGRAM(S): 2.5 TABLET ORAL at 05:59

## 2019-10-22 RX ADMIN — Medication 2: at 12:41

## 2019-10-22 RX ADMIN — Medication 1 SPRAY(S): at 17:31

## 2019-10-22 RX ADMIN — Medication 81 MILLIGRAM(S): at 12:41

## 2019-10-22 RX ADMIN — PIPERACILLIN AND TAZOBACTAM 25 GRAM(S): 4; .5 INJECTION, POWDER, LYOPHILIZED, FOR SOLUTION INTRAVENOUS at 05:59

## 2019-10-22 RX ADMIN — PIPERACILLIN AND TAZOBACTAM 25 GRAM(S): 4; .5 INJECTION, POWDER, LYOPHILIZED, FOR SOLUTION INTRAVENOUS at 13:02

## 2019-10-22 RX ADMIN — Medication 1 SPRAY(S): at 21:33

## 2019-10-22 RX ADMIN — MUPIROCIN 1 APPLICATION(S): 20 OINTMENT TOPICAL at 17:33

## 2019-10-22 RX ADMIN — MUPIROCIN 1 APPLICATION(S): 20 OINTMENT TOPICAL at 05:59

## 2019-10-22 RX ADMIN — Medication 1 SPRAY(S): at 02:00

## 2019-10-22 RX ADMIN — Medication 1 UNIT(S): at 17:33

## 2019-10-22 RX ADMIN — Medication 1: at 17:33

## 2019-10-22 RX ADMIN — OXYMETAZOLINE HYDROCHLORIDE 1 SPRAY(S): 0.5 SPRAY NASAL at 06:00

## 2019-10-22 NOTE — PROGRESS NOTE ADULT - ATTENDING COMMENTS
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 Pt admitted for Sepsis 2/2 nasal abscess and sinusitis.    Sepsis due to sinusitis and abscess ( fever, tachycardia and leukocytosis) not present on admission, pt infection progressed. c/w empiric ABx, f/u ID recs, I&D per ENT. c/w monitoring. Given the lytic bone lesion on mandible, plan for dental eval, will f/u    Hyponatremia: likely due to SIADH in the setting of parotid tumor, salt tabs and fluid restriction, Trend Na    L parotid tumor: s/p ENT eval, ENT/Onc f/u outpt.

## 2019-10-22 NOTE — PROGRESS NOTE ADULT - PROBLEM SELECTOR PLAN 2
Likely SIADH. sodium chloride 1g BID initiated yesterday  Na trend: 124--> 127    Plan:   - continue NaCl 1g BID   - continue monitoring BMP Likely SIADH combined with hypovolemia.   Na trend: 124--> 127--> 130 ---> 128 ---> 126    Plan:   - continue NaCl 1g daily   - continue monitoring BMP BID

## 2019-10-22 NOTE — DISCHARGE NOTE PROVIDER - NSDCCPCAREPLAN_GEN_ALL_CORE_FT
PRINCIPAL DISCHARGE DIAGNOSIS  Diagnosis: Cellulitis of face  Assessment and Plan of Treatment: PRINCIPAL DISCHARGE DIAGNOSIS  Diagnosis: Cellulitis of face  Assessment and Plan of Treatment: You came to the hospital with swelling around your eyes and lips. We obtained CT of your face , which showed infection within your nose. Our Ear, Nose and Throat specialists evaluated you and drained the infection source within your right nose. You were treated with broad spectrum intraveous antibiotics from 10/19 to 10/22. Culture from your nose grew MSSA ( Methicillin sensitive Staphylococcus aureus). We tailed antibiotics according to the culture. We are discharging you home on Keflex 500mg by mouth every 8 hours for 5 days, first dose on 10/24.   Ear, nose, and throat team recommended daily packing for 2 more days , use 1/4 packing and pack right nose with a cotton tip. PRINCIPAL DISCHARGE DIAGNOSIS  Diagnosis: Cellulitis of face  Assessment and Plan of Treatment: You came to the hospital with swelling around your eyes and lips. We obtained CT of your face , which showed infection within your nose. Our Ear, Nose and Throat specialists evaluated you and drained the infection source within your right nose. You were treated with broad spectrum intraveous antibiotics from 10/19 to 10/22. Culture from your nose grew MSSA ( Methicillin sensitive Staphylococcus aureus). We tailed antibiotics according to the culture. We are discharging you home on Keflex 500mg by mouth every 8 hours for 5 days, first dose on 10/24.   Ear, nose, and throat team recommended daily packing for 2 more days , use 1/4 packing and pack right nose with a cotton tip.      SECONDARY DISCHARGE DIAGNOSES  Diagnosis: Hyponatremia  Assessment and Plan of Treatment: Your sodium level was low. We treated you with water restriction, < 1L per day, and salt tabs. You did not have any symptoms due to the low sodium throughout this hospitalization. On discharge, your sodium level has normalized.  You should followup with your primary care physician within 1 week to obtain BMP (basic metablic panel) to measure your sodium level. PRINCIPAL DISCHARGE DIAGNOSIS  Diagnosis: Cellulitis of face  Assessment and Plan of Treatment: You came to the hospital with swelling around your eyes and lips. We obtained CT of your face , which showed infection within your nose. Our Ear, Nose and Throat specialists evaluated you and drained the infection source within your right nose. You were treated with broad spectrum intraveous antibiotics from 10/19 to 10/22. Culture from your nose grew MSSA ( Methicillin sensitive Staphylococcus aureus). We tailed antibiotics according to the culture. We are discharging you home on Keflex 500mg by mouth every 8 hours for 5 days, first dose on 10/24.   Ear, nose, and throat team recommended daily packing for 2 more days , use 1/4 packing and pack right nose with a cotton tip.      SECONDARY DISCHARGE DIAGNOSES  Diagnosis: Hypertension, unspecified type  Assessment and Plan of Treatment: We had stopped one of your blood pressure medications (Lisinopril) due to normal blood pressures. Please see your primary care doctor within 1 week to address restrarting this medication.    Diagnosis: Hyponatremia  Assessment and Plan of Treatment: Your sodium level was low. We treated you with water restriction, < 1L per day, and salt tabs. You did not have any symptoms due to the low sodium throughout this hospitalization. On discharge, your sodium level has continued to stay low but improved. You will be on salt tablets twice a day in the interim.   You should followup with your primary care physician within 1 week to obtain BMP (basic metablic panel) to measure your sodium level. PRINCIPAL DISCHARGE DIAGNOSIS  Diagnosis: Cellulitis of face  Assessment and Plan of Treatment: You came to the hospital with swelling around your eyes and lips. We obtained CT of your face , which showed infection within your nose. Our Ear, Nose and Throat specialists evaluated you and drained the infection source within your right nose. You were treated with broad spectrum intraveous antibiotics from 10/19 to 10/22. Culture from your nose grew MSSA ( Methicillin sensitive Staphylococcus aureus). We tailed antibiotics according to the culture. We are discharging you home on Keflex 500mg by mouth every 8 hours for 5 days, first dose on 10/24.   Ear, nose, and throat team recommended daily packing for 2 more days , use 1/4 packing and pack right nose with a cotton tip.      SECONDARY DISCHARGE DIAGNOSES  Diagnosis: Hypertension, unspecified type  Assessment and Plan of Treatment: We had stopped one of your blood pressure medications (Lisinopril) due to normal blood pressures. Please see your primary care doctor within 1 week to address restrarting this medication.    Diagnosis: Radiologic finding  Assessment and Plan of Treatment: You were found to have a lytic lesion in one of the bones of your upper jaw, which is concerning given your recent history of a parotid tumor. You were consulted on by dental and oral maxillofacial surgery last admission and they had recommended tooth extraction which you had declined. They had seen you during this admission and you had declined as well. Please follow up with the dental clinic and oral maxillofacial clinic when you get discharged to see if you need further intervention.    Diagnosis: Hyponatremia  Assessment and Plan of Treatment: Your sodium level was low. We treated you with water restriction, < 1L per day, and salt tabs. You did not have any symptoms due to the low sodium throughout this hospitalization. On discharge, your sodium level has continued to stay low but improved. You will be on salt tablets twice a day in the interim.   You should followup with your primary care physician within 1 week to obtain BMP (basic metablic panel) to measure your sodium level.

## 2019-10-22 NOTE — DISCHARGE NOTE PROVIDER - INSTRUCTIONS
"Flu" Carbohydrate consistent diet Carbohydrate consistent diet  Please drink no more than 1200 ml of water every day as this can cause your sodium level to drop.

## 2019-10-22 NOTE — PROGRESS NOTE ADULT - ASSESSMENT
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

## 2019-10-22 NOTE — PROGRESS NOTE ADULT - SUBJECTIVE AND OBJECTIVE BOX
Seen and examined at bedside.  No acute events    Nasal packing changed at bedside    Vital Signs Last 24 Hrs  T(C): 36.8 (22 Oct 2019 01:27), Max: 37.3 (21 Oct 2019 06:36)  T(F): 98.2 (22 Oct 2019 01:27), Max: 99.2 (21 Oct 2019 06:36)  HR: 88 (22 Oct 2019 01:27) (88 - 100)  BP: 137/61 (22 Oct 2019 01:27) (126/62 - 161/61)  BP(mean): --  RR: 17 (22 Oct 2019 01:27) (17 - 18)  SpO2: 97% (22 Oct 2019 01:27) (97% - 100%).    NAD, awake and alert  Breathing comfortably on RA  Face: swelling on right improved from yesterday  Nose: moderate swelling and induration of the nasal tip with macerated skin, mucoid/purulent fluid debrided from anterior nasal cavity, right nasal septum with patent incision, packing, min purulence expressed from wound   OC/OP: poor dentition  Neck soft/flat    A/P  70 y/o Serbian speaking M with hx of DM, HTN, HLD, L parotid tumor s/p L parotid, SND 2-4, cervicofacial flap 4/17, CAD s/p pacemaker and stent 8 years ago, and CKD presented 10/19 with infection of nose/face, now with nasal abscess s/p I&D of right septum    -abx per primary team  -f/u culture  -nasal packing, leave in place, ORL will manage  -strict glucose control  -mupirocin to right nare BID  -warm compresses q4hr to nose  -nasal saline sprays q4h while awake  -will follow  -call/page with questions

## 2019-10-22 NOTE — PROGRESS NOTE ADULT - PROBLEM SELECTOR PLAN 4
-painful, crusted lesions on nose in setting of recent steroid use  -s/p Clindamycin initially and broadened to vanc, zosyn. c/w abx  -does not appear to be consistent with zoster as lesions on both sides of nose

## 2019-10-22 NOTE — DISCHARGE NOTE PROVIDER - NSDCFUADDINST_GEN_ALL_CORE_FT
Visiting Nurse Instructions  1) Use 1/4 packing and pack right nasal septum with a cotton tip, secure with tape for two more days   2) mupirocin to right nare BID  3) warm compresses every 4hrs to nose  4) nasal saline sprays every 4hrs  while awake    For patient   1) Please take antibiotics for five more days, you are supposed to take it every 8 hours   2) Please continue taking salt tablets, 1 tablet twice a day and schedule an appt with your primary care doctor in 1 week to have repeat blood work for your sodium level. At the same visit, please discuss restarting your blood pressure medication, lisinopril at that visit.   3) Please take tylenol 975mg twice a day for a week to help with the tendinitis in your left elbow Visiting Nurse Instructions  1) Use 1/4 packing and pack right nasal septum with a cotton tip, secure with tape for two more days   2) mupirocin to right nare BID  3) warm compresses every 4hrs to nose  4) nasal saline sprays every 4hrs  while awake    For patient   1) Please take antibiotics for five more days, you are supposed to take it every 8 hours   2) Please continue taking salt tablets, 1 tablet twice a day and schedule an appt with your primary care doctor in 1 week to have repeat blood work for your sodium level. At the same visit, please discuss restarting your blood pressure medication, lisinopril at that visit.   3) Please take tylenol 975mg twice a day for a week to help with the tendinitis in your left elbow  4) Please follow with the dental and maxillofacial clinic re: the lytic lesion that was noted on the CT scan of your sinuses. They had recommended interventions which you had declined during your hospitalization. Visiting Nurse Instructions  1) Use 1/4 packing and pack right nasal septum with a cotton tip, secure with tape for one more day  2) mupirocin to right nare BID  3) warm compresses every 4hrs to nose  4) nasal saline sprays every 4hrs  while awake    For patient   1) Please take antibiotics for five more days, you are supposed to take it every 8 hours   2) Please continue taking salt tablets, 1 tablet twice a day and schedule an appt with your primary care doctor in 1 week to have repeat blood work for your sodium level. At the same visit, please discuss restarting your blood pressure medication, lisinopril at that visit.   3) Please take tylenol 975mg twice a day for a week to help with the tendinitis in your left elbow  4) Please follow with the dental and maxillofacial clinic re: the lytic lesion that was noted on the CT scan of your sinuses. They had recommended interventions which you had declined during your hospitalization.

## 2019-10-22 NOTE — DISCHARGE NOTE PROVIDER - NSFOLLOWUPCLINICS_GEN_ALL_ED_FT
[facility] - ENT  Otolaryngology (ENT)  [address] [place] [address]  Phone: [phone]  Fax:   Follow Up Time: 4-6 Days [facility] - ENT  Otolaryngology (ENT)  [address] [place] [address]  Phone: [phone]  Fax:   Follow Up Time: 4-6 Days    Oral & Maxillofacial Surgery  Department of Dental Medicine  [address] [place] [address]  Phone: [phone]  Fax: [phone]    [facility]  Dental  [address] [place] [address]  Phone: [phone]  Fax:   Follow Up Time:

## 2019-10-22 NOTE — PROGRESS NOTE ADULT - SUBJECTIVE AND OBJECTIVE BOX
Manuela Blum   PGY-1 Resident Physician   Pager [phone]/ [phone] from 7am to 7pm, after 7pm page night float     Patient is a 69y old  Male who presents with a chief complaint of facial edema and pain (22 Oct 2019 04:52)      SUBJECTIVE / OVERNIGHT EVENTS: No acute event overnight.     MEDICATIONS  (STANDING):  amLODIPine   Tablet 5 milliGRAM(s) Oral daily  aspirin enteric coated 81 milliGRAM(s) Oral daily  dextrose 5%. 1000 milliLiter(s) (50 mL/Hr) IV Continuous <Continuous>  dextrose 50% Injectable 12.5 Gram(s) IV Push once  dextrose 50% Injectable 25 Gram(s) IV Push once  dextrose 50% Injectable 25 Gram(s) IV Push once  enoxaparin Injectable 40 milliGRAM(s) SubCutaneous daily  insulin glargine Injectable (LANTUS) 6 Unit(s) SubCutaneous at bedtime  insulin lispro (HumaLOG) corrective regimen sliding scale   SubCutaneous three times a day before meals  insulin lispro (HumaLOG) corrective regimen sliding scale   SubCutaneous at bedtime  lactobacillus acidophilus 1 Tablet(s) Oral daily  mupirocin 2% Ointment 1 Application(s) Topical two times a day  oxymetazoline 0.05% Nasal Spray 1 Spray(s) Both Nostrils two times a day  piperacillin/tazobactam IVPB.. 3.375 Gram(s) IV Intermittent every 8 hours  sodium chloride 0.65% Nasal 1 Spray(s) Both Nostrils every 4 hours  vancomycin  IVPB 1000 milliGRAM(s) IV Intermittent every 12 hours  vancomycin  IVPB        MEDICATIONS  (PRN):  dextrose 40% Gel 15 Gram(s) Oral once PRN Blood Glucose LESS THAN 70 milliGRAM(s)/deciliter  glucagon  Injectable 1 milliGRAM(s) IntraMuscular once PRN Glucose LESS THAN 70 milligrams/deciliter    Allergies    No Known Allergies    Intolerances        Vital Signs Last 24 Hrs  T(C): 36.9 (22 Oct 2019 05:56), Max: 37 (21 Oct 2019 10:00)  T(F): 98.4 (22 Oct 2019 05:56), Max: 98.6 (21 Oct 2019 10:00)  HR: 89 (22 Oct 2019 05:56) (88 - 100)  BP: 126/60 (22 Oct 2019 05:56) (126/60 - 161/61)  BP(mean): --  RR: 19 (22 Oct 2019 05:56) (17 - 19)  SpO2: 94% (22 Oct 2019 05:56) (94% - 100%)  Daily     Daily   CAPILLARY BLOOD GLUCOSE      POCT Blood Glucose.: 243 mg/dL (21 Oct 2019 22:21)  POCT Blood Glucose.: 256 mg/dL (21 Oct 2019 17:54)  POCT Blood Glucose.: 235 mg/dL (21 Oct 2019 12:19)  POCT Blood Glucose.: 227 mg/dL (21 Oct 2019 08:20)    I&O's Summary      PHYSICAL EXAM:  GENERAL: NAD, well-developed  HEAD:  Atraumatic, Normocephalic  EYES: EOMI, PERRLA, conjunctiva and sclera clear  NECK: Supple, No JVD  CHEST/LUNG: Clear to auscultation bilaterally; No wheeze  HEART: Regular rate and rhythm; Normal S1 S2, No murmurs, rubs, or gallops  ABDOMEN: Soft, Nontender, Nondistended; Bowel sounds present  EXTREMITIES:  2+ Peripheral Pulses, No clubbing, cyanosis, or edema  PSYCH: AAOx3  NEUROLOGY: non-focal  SKIN: No rashes or lesions    LABS:                        12.5   18.66 )-----------( 205      ( 21 Oct 2019 04:55 )             37.6     Hgb Trend: 12.5<--, 12.5<--, 13.8<--, 14.3<--  10-22    128<L>  |  95<L>  |  16  ----------------------------<  237<H>  3.9   |  21<L>  |  1.03    Ca    8.3<L>      22 Oct 2019 01:26  Phos  3.6     10-21  Mg     2.0     10-21      Creatinine Trend: 1.03<--, 1.06<--, 1.25<--, 1.15<--, 1.08<--, 0.90<-- Manuelamayra Blum   PGY-1 Resident Physician   Pager [phone]/ [phone] from 7am to 7pm, after 7pm page night float     Patient is a 69y old  Male who presents with a chief complaint of facial edema and pain (22 Oct 2019 04:52)      SUBJECTIVE / OVERNIGHT EVENTS: No acute event overnight. This morning, patient complains of bifrontal mild headache (severity 5, on a scale from 0 to 10), with 1 hour duration, and also nose pain. Otherwise, patient denies chest pain/shortness of breath/abdominal pain.     MEDICATIONS  (STANDING):  amLODIPine   Tablet 5 milliGRAM(s) Oral daily  aspirin enteric coated 81 milliGRAM(s) Oral daily  dextrose 5%. 1000 milliLiter(s) (50 mL/Hr) IV Continuous <Continuous>  dextrose 50% Injectable 12.5 Gram(s) IV Push once  dextrose 50% Injectable 25 Gram(s) IV Push once  dextrose 50% Injectable 25 Gram(s) IV Push once  enoxaparin Injectable 40 milliGRAM(s) SubCutaneous daily  insulin glargine Injectable (LANTUS) 6 Unit(s) SubCutaneous at bedtime  insulin lispro (HumaLOG) corrective regimen sliding scale   SubCutaneous three times a day before meals  insulin lispro (HumaLOG) corrective regimen sliding scale   SubCutaneous at bedtime  lactobacillus acidophilus 1 Tablet(s) Oral daily  mupirocin 2% Ointment 1 Application(s) Topical two times a day  oxymetazoline 0.05% Nasal Spray 1 Spray(s) Both Nostrils two times a day  piperacillin/tazobactam IVPB.. 3.375 Gram(s) IV Intermittent every 8 hours  sodium chloride 0.65% Nasal 1 Spray(s) Both Nostrils every 4 hours  vancomycin  IVPB 1000 milliGRAM(s) IV Intermittent every 12 hours  vancomycin  IVPB        MEDICATIONS  (PRN):  dextrose 40% Gel 15 Gram(s) Oral once PRN Blood Glucose LESS THAN 70 milliGRAM(s)/deciliter  glucagon  Injectable 1 milliGRAM(s) IntraMuscular once PRN Glucose LESS THAN 70 milligrams/deciliter    Allergies    No Known Allergies        Vital Signs Last 24 Hrs  T(C): 36.9 (22 Oct 2019 05:56), Max: 37 (21 Oct 2019 10:00)  T(F): 98.4 (22 Oct 2019 05:56), Max: 98.6 (21 Oct 2019 10:00)  HR: 89 (22 Oct 2019 05:56) (88 - 100)  BP: 126/60 (22 Oct 2019 05:56) (126/60 - 161/61)  BP(mean): --  RR: 19 (22 Oct 2019 05:56) (17 - 19)  SpO2: 94% (22 Oct 2019 05:56) (94% - 100%)  Daily     Daily   CAPILLARY BLOOD GLUCOSE      POCT Blood Glucose.: 243 mg/dL (21 Oct 2019 22:21)  POCT Blood Glucose.: 256 mg/dL (21 Oct 2019 17:54)  POCT Blood Glucose.: 235 mg/dL (21 Oct 2019 12:19)  POCT Blood Glucose.: 227 mg/dL (21 Oct 2019 08:20)    I&O's Summary      PHYSICAL EXAM:  General: patient lying in bed, comfortable, in no acute distress  HEENT: R nose s/p I & D, s/p packing, moist mucous membrane, clear oropharynx   CV: regular rate/ rhythm, no murmur/gallop/rubs  Pulm: lungs clear to ausculation bilaterally, no wheezes/crackles  Abd: soft, nondistended, not tender to palpation in all 4 quadrants, normal active bowel sounds     LABS:                        12.5   18.66 )-----------( 205      ( 21 Oct 2019 04:55 )             37.6     Hgb Trend: 12.5<--, 12.5<--, 13.8<--, 14.3<--  10-22    128<L>  |  95<L>  |  16  ----------------------------<  237<H>  3.9   |  21<L>  |  1.03    Ca    8.3<L>      22 Oct 2019 01:26  Phos  3.6     10-21  Mg     2.0     10-21      Creatinine Trend: 1.03<--, 1.06<--, 1.25<--, 1.15<--, 1.08<--, 0.90<-- Manuelamayra Blum   PGY-1 Resident Physician   Pager [phone]/ [phone] from 7am to 7pm, after 7pm page night float     Patient is a 69y old  Male who presents with a chief complaint of facial edema and pain (22 Oct 2019 04:52)      SUBJECTIVE / OVERNIGHT EVENTS: No acute event overnight. This morning, patient complains of bifrontal mild headache (severity 5, on a scale from 0 to 10), with 1 hour duration, and also nose pain. Otherwise, patient denies chest pain/shortness of breath/abdominal pain.     MEDICATIONS  (STANDING):  amLODIPine   Tablet 5 milliGRAM(s) Oral daily  aspirin enteric coated 81 milliGRAM(s) Oral daily  dextrose 5%. 1000 milliLiter(s) (50 mL/Hr) IV Continuous <Continuous>  dextrose 50% Injectable 12.5 Gram(s) IV Push once  dextrose 50% Injectable 25 Gram(s) IV Push once  dextrose 50% Injectable 25 Gram(s) IV Push once  enoxaparin Injectable 40 milliGRAM(s) SubCutaneous daily  insulin glargine Injectable (LANTUS) 6 Unit(s) SubCutaneous at bedtime  insulin lispro (HumaLOG) corrective regimen sliding scale   SubCutaneous three times a day before meals  insulin lispro (HumaLOG) corrective regimen sliding scale   SubCutaneous at bedtime  lactobacillus acidophilus 1 Tablet(s) Oral daily  mupirocin 2% Ointment 1 Application(s) Topical two times a day  oxymetazoline 0.05% Nasal Spray 1 Spray(s) Both Nostrils two times a day  piperacillin/tazobactam IVPB.. 3.375 Gram(s) IV Intermittent every 8 hours  sodium chloride 0.65% Nasal 1 Spray(s) Both Nostrils every 4 hours  vancomycin  IVPB 1000 milliGRAM(s) IV Intermittent every 12 hours  vancomycin  IVPB        MEDICATIONS  (PRN):  dextrose 40% Gel 15 Gram(s) Oral once PRN Blood Glucose LESS THAN 70 milliGRAM(s)/deciliter  glucagon  Injectable 1 milliGRAM(s) IntraMuscular once PRN Glucose LESS THAN 70 milligrams/deciliter    Allergies    No Known Allergies        Vital Signs Last 24 Hrs  T(C): 36.9 (22 Oct 2019 05:56), Max: 37 (21 Oct 2019 10:00)  T(F): 98.4 (22 Oct 2019 05:56), Max: 98.6 (21 Oct 2019 10:00)  HR: 89 (22 Oct 2019 05:56) (88 - 100)  BP: 126/60 (22 Oct 2019 05:56) (126/60 - 161/61)  BP(mean): --  RR: 19 (22 Oct 2019 05:56) (17 - 19)  SpO2: 94% (22 Oct 2019 05:56) (94% - 100%)  Daily     Daily   CAPILLARY BLOOD GLUCOSE      POCT Blood Glucose.: 243 mg/dL (21 Oct 2019 22:21)  POCT Blood Glucose.: 256 mg/dL (21 Oct 2019 17:54)  POCT Blood Glucose.: 235 mg/dL (21 Oct 2019 12:19)  POCT Blood Glucose.: 227 mg/dL (21 Oct 2019 08:20)    I&O's Summary      PHYSICAL EXAM:  General: patient lying in bed, comfortable, in no acute distress  HEENT: R nose s/p I & D, s/p packing, moist mucous membrane, clear oropharynx   CV: regular rate/ rhythm, no murmur/gallop/rubs  Pulm: lungs clear to ausculation bilaterally, no wheezes/crackles  Abd: soft, nondistended, not tender to palpation in all 4 quadrants, normal active bowel sounds  EXT: no clubbing or cyanosis     LABS:                        12.5   18.66 )-----------( 205      ( 21 Oct 2019 04:55 )             37.6     Hgb Trend: 12.5<--, 12.5<--, 13.8<--, 14.3<--  10-22    128<L>  |  95<L>  |  16  ----------------------------<  237<H>  3.9   |  21<L>  |  1.03    Ca    8.3<L>      22 Oct 2019 01:26  Phos  3.6     10-21  Mg     2.0     10-21      Creatinine Trend: 1.03<--, 1.06<--, 1.25<--, 1.15<--, 1.08<--, 0.90<--

## 2019-10-22 NOTE — DISCHARGE NOTE PROVIDER - CARE PROVIDER_API CALL
Colette Hair)  Internal Medicine  [id number] [place] [address]  Phone: [phone]  Fax: [phone]  Follow Up Time: 1 week

## 2019-10-22 NOTE — PROGRESS NOTE ADULT - SUBJECTIVE AND OBJECTIVE BOX
Follow Up: Nasal septum abscess    Interval History/ROS: s/p I&D yesterday by ENT. Feels ok today. Afebrile. No cough, diarrhea or dysuria. Nose still hurts and has a hard time swallowing. Family would like him to stay in the hospital longer.     Allergies  No Known Allergies    ANTIMICROBIALS:  piperacillin/tazobactam IVPB.. 3.375 every 8 hours  vancomycin  IVPB 1000 every 12 hours  vancomycin  IVPB        OTHER MEDS:  amLODIPine   Tablet 5 milliGRAM(s) Oral daily  aspirin enteric coated 81 milliGRAM(s) Oral daily  dextrose 40% Gel 15 Gram(s) Oral once PRN  dextrose 5%. 1000 milliLiter(s) IV Continuous <Continuous>  dextrose 50% Injectable 12.5 Gram(s) IV Push once  dextrose 50% Injectable 25 Gram(s) IV Push once  dextrose 50% Injectable 25 Gram(s) IV Push once  enoxaparin Injectable 40 milliGRAM(s) SubCutaneous daily  glucagon  Injectable 1 milliGRAM(s) IntraMuscular once PRN  insulin glargine Injectable (LANTUS) 9 Unit(s) SubCutaneous at bedtime  insulin lispro (HumaLOG) corrective regimen sliding scale   SubCutaneous three times a day before meals  insulin lispro (HumaLOG) corrective regimen sliding scale   SubCutaneous at bedtime  insulin lispro Injectable (HumaLOG) 1 Unit(s) SubCutaneous three times a day before meals  lactobacillus acidophilus 1 Tablet(s) Oral daily  mupirocin 2% Ointment 1 Application(s) Topical two times a day  oxymetazoline 0.05% Nasal Spray 1 Spray(s) Both Nostrils two times a day  sodium chloride 1 Gram(s) Oral daily  sodium chloride 0.65% Nasal 1 Spray(s) Both Nostrils every 4 hours      Vital Signs Last 24 Hrs  T(C): 37.2 (22 Oct 2019 13:55), Max: 37.2 (22 Oct 2019 13:55)  T(F): 98.9 (22 Oct 2019 13:55), Max: 98.9 (22 Oct 2019 13:55)  HR: 77 (22 Oct 2019 13:55) (77 - 89)  BP: 127/58 (22 Oct 2019 13:55) (126/60 - 137/61)  BP(mean): --  RR: 17 (22 Oct 2019 13:55) (17 - 19)  SpO2: 95% (22 Oct 2019 13:55) (94% - 98%)    Physical Exam:  General: NAD, non toxic  Head: atraumatic, normocephalic  Eye: normal sclera and conjunctiva  ENT: no oropharyngeal lesions, no cervical lymphadenopathy   Cardio:  regular rate and rhythm   Respiratory: nonlabored, clear bilaterally, no wheezing  abd: soft, BS +, no tenderness  : no CVAT, no suprapubic tenderness, no  girard  Musculoskeletal: no joint swelling, no edema  vascular: no phlebitis   Skin: no rash  Neurologic: no focal deficit  psych: normal affect                          12.4   15.62 )-----------( 213      ( 22 Oct 2019 06:15 )             35.7       10-22    126<L>  |  95<L>  |  14  ----------------------------<  278<H>  4.0   |  21<L>  |  0.93    Ca    8.3<L>      22 Oct 2019 06:15  Phos  2.4     10-22  Mg     1.8     10-22            MICROBIOLOGY:  v          RADIOLOGY:  Images below reviewed personally Follow Up: Nasal septum abscess    Interval History/ROS: s/p I&D yesterday by ENT. Feels ok today. Afebrile. No cough, diarrhea or dysuria. Nose still hurts and has a hard time swallowing. Family would like him to stay in the hospital longer.     Allergies  No Known Allergies    ANTIMICROBIALS:  piperacillin/tazobactam IVPB.. 3.375 every 8 hours  vancomycin  IVPB 1000 every 12 hours  vancomycin  IVPB        OTHER MEDS:  amLODIPine   Tablet 5 milliGRAM(s) Oral daily  aspirin enteric coated 81 milliGRAM(s) Oral daily  dextrose 40% Gel 15 Gram(s) Oral once PRN  dextrose 5%. 1000 milliLiter(s) IV Continuous <Continuous>  dextrose 50% Injectable 12.5 Gram(s) IV Push once  dextrose 50% Injectable 25 Gram(s) IV Push once  dextrose 50% Injectable 25 Gram(s) IV Push once  enoxaparin Injectable 40 milliGRAM(s) SubCutaneous daily  glucagon  Injectable 1 milliGRAM(s) IntraMuscular once PRN  insulin glargine Injectable (LANTUS) 9 Unit(s) SubCutaneous at bedtime  insulin lispro (HumaLOG) corrective regimen sliding scale   SubCutaneous three times a day before meals  insulin lispro (HumaLOG) corrective regimen sliding scale   SubCutaneous at bedtime  insulin lispro Injectable (HumaLOG) 1 Unit(s) SubCutaneous three times a day before meals  lactobacillus acidophilus 1 Tablet(s) Oral daily  mupirocin 2% Ointment 1 Application(s) Topical two times a day  oxymetazoline 0.05% Nasal Spray 1 Spray(s) Both Nostrils two times a day  sodium chloride 1 Gram(s) Oral daily  sodium chloride 0.65% Nasal 1 Spray(s) Both Nostrils every 4 hours      Vital Signs Last 24 Hrs  T(C): 37.2 (22 Oct 2019 13:55), Max: 37.2 (22 Oct 2019 13:55)  T(F): 98.9 (22 Oct 2019 13:55), Max: 98.9 (22 Oct 2019 13:55)  HR: 77 (22 Oct 2019 13:55) (77 - 89)  BP: 127/58 (22 Oct 2019 13:55) (126/60 - 137/61)  BP(mean): --  RR: 17 (22 Oct 2019 13:55) (17 - 19)  SpO2: 95% (22 Oct 2019 13:55) (94% - 98%)    Physical Exam:  General: sleepy, doesn't want to talk much, non toxic  Head: atraumatic, normocephalic  Eye: normal sclera and conjunctiva  ENT: nose swollen, painful. sinuses nontender.   Cardio: regular rate and rhythm   Respiratory: nonlabored, clear bilaterally, no wheezing  abd: soft, BS +, no tenderness  Musculoskeletal: no joint swelling, no edema  vascular: no phlebitis   Skin: no rash  Neurologic: no focal deficit                          12.4   15.62 )-----------( 213      ( 22 Oct 2019 06:15 )             35.7       10-22    126<L>  |  95<L>  |  14  ----------------------------<  278<H>  4.0   |  21<L>  |  0.93    Ca    8.3<L>      22 Oct 2019 06:15  Phos  2.4     10-22  Mg     1.8     10-22    MICROBIOLOGY:  Culture - Blood (collected 10-20-19 @ 16:16)  Source: BLOOD VENOUS  Preliminary Report (10-21-19 @ 16:16):    NO ORGANISMS ISOLATED    NO ORGANISMS ISOLATED AT 24 HOURS    Culture - Blood (collected 10-20-19 @ 16:16)  Source: BLOOD PERIPHERAL  Preliminary Report (10-21-19 @ 16:16):    NO ORGANISMS ISOLATED    NO ORGANISMS ISOLATED AT 24 HOURS    Culture - Wound (collected 10-20-19 @ 11:16)  Source: OTHER  Final Report (10-22-19 @ 14:00):    FEW  Organism: Staphylococcus aureus (10-22-19 @ 14:00)  Organism: Staphylococcus aureus (10-22-19 @ 14:00)      -  Cefazolin: S <=4 JENNIFER      -  Ciprofloxacin: R >2 JENNIFER      -  Clindamycin: R This isolate is presumed to be resistant on the basis of  detection of inducible Clindamycin resistance.  Clindamycin  still might be effective in some patients.      -  Erythromycin: R >4 JENNIFER      -  Gentamicin: S <=1 JENNIFER      -  Levofloxacin: R >4 JENNIFER      -  Moxifloxacin(Aerobic): R 4 JENNIFER      -  Oxacillin: S 1 JENNIFER      -  Penicillin: R >8 JENNIFER      -  Rifampin: S <=1 JENNIFER      -  Tetra/Doxy: S <=1 JENNIFER      -  Trimethoprim/Sulfamethoxazole: S 1/19 JENNIFER      -  Vancomycin: S 2 JENNIFER      Method Type: POSITIVE JENNIFER 29    Culture - Urine (collected 10-20-19 @ 08:48)  Source: URINE MIDSTREAM  Final Report (10-20-19 @ 08:56):    NO GROWTH AT 24 HOURS    Culture - Blood (collected 10-20-19 @ 07:05)  Source: BLOOD PERIPHERAL  Preliminary Report (10-22-19 @ 07:05):    NO ORGANISMS ISOLATED    NO ORGANISMS ISOLATED AT 48 HRS.    Culture - Wound (collected 10-19-19 @ 08:24)  Source: OTHER  Final Report (10-22-19 @ 13:49):    MODERATE  Organism: Staphylococcus aureus (10-22-19 @ 13:49)  Organism: Staphylococcus aureus (10-22-19 @ 13:49)      -  Cefazolin: S <=4 JENNIFER      -  Ciprofloxacin: R >2 JENNIFER      -  Clindamycin: R This isolate is presumed to be resistant on the basis of  detection of inducible Clindamycin resistance.  Clindamycin  still might be effective in some patients.      -  Erythromycin: R >4 JENNIFER      -  Gentamicin: S <=1 JENNIFER      -  Levofloxacin: R >4 JENNIFER      -  Moxifloxacin(Aerobic): R 4 JENNIFER      -  Oxacillin: S 1 JENNIFER      -  Penicillin: R >8 JENNIFER      -  Rifampin: S <=1 JENNIFER      -  Tetra/Doxy: S <=1 JENNIFER      -  Trimethoprim/Sulfamethoxazole: S 1/19 JENNIFER      -  Vancomycin: S 2 JENNIFER      Method Type: POSITIVE JENNIFER 29    RADIOLOGY:  Images below reviewed personally  CT Maxillofacial w/ IV Cont (10.20.19 @ 11:58)   A large anterior nasal septal abscess is noted as described.  Evolving postoperative changes are again noted status post resection of left parotid mass, with associated surgical clips. The nonspecific soft tissue within the operative bed is similar compared to the oapetutjbovvo86/28/2019 PET/CT study.  There has been interval increase in size of the bilateral level 1B cervical lymph nodes. Although these could reflect reactive nodes, pathologic nodes can't be excluded.  A nonspecific rounded lytic defect involving the left mandibular alveolus in the molar region is stable compared to the prior study. Correlate for dental extraction tooth socket, osteomyelitis, or underlying mass.

## 2019-10-22 NOTE — DISCHARGE NOTE PROVIDER - HOSPITAL COURSE
HPI:     Hospital course: On admission, patient meets sepsis criteria with HR>90 and leukocytosis (although leukocytosis may be from recent steroid use).     -unlikely to be angioedema, considering the lack of fever/nausea/vomiting/wheezes/respiratory difficulties or other systemic signs/symptoms, also patient has been on ACEi for a long time.     - CT chest ruled out SVC syndrome     - CT maxillofacial showed abscess involving nasal septum, measuring 3.7 cm x 1.8 cm x 3.1 cm, extends into the lateral nasal cartilage, s/p I & D by ENT.     Patient empirically treated with vancomycin and zosyn. Wound culture MSSA, antibiotics switched to ancef.         Patient discharged home in stable condition. 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 History obtained from patient and family members (daughter and wife) at bedside using . States that 10 days ago, he began feeling very weak. Five days ago, the patient developed a painful rash on his nose, which began to crust. The night prior to admission, the patient had extreme pain of the nose, with swelling below the eyes, with difficulty breathing (states its more due to nasal congestion). He also endorsed severe headache and abdominal cramps, as well as nausea without vomiting. Ten days ago, he was prescribed Dexamethasone 2mg twice daily for ear discomfort (from 10/6 - 10/15, hasnt been on it since), as well as a multivitamin. He denies taking Dexamethasone before that. He denies fevers, chest pain, neck or throat pain, dysphagia, diarrhea/constipation, urinary symptoms, numbness/weakness of the arms, legs or neck, or neck swelling. He denies any sick contacts or recent travel. At this time, patient still has nose pain and weakness, however his other symptoms have improved.     In the ED, the patient's VS: T 99.7, HR 84, /71, RR 18, and SpO2 9        Hospital course:     # Abscess     On admission, patient meets sepsis criteria with HR>90 and leukocytosis. CT chest ruled out SVC syndrome. CT maxillofacial with IV contrast showed abscess involving nasal septum, measuring 3.7 cm x 1.8 cm x 3.1 cm, extends into the lateral nasal cartilage. ENT followed patient, s/p R septum I & D with minimal pus, mupirocin to R nare BID, afrin BID x 3 days. Infectious disease team followed patient. Patient initially empirically treated with clindamycin on 10/19, then broadened to vancomycin and zosyn from 10/19 to 10/22. Wound culture grew MSSA, antibiotics switched to Ancef 10/22-10/23. On 10/24, IV antibiotics switched to oral antibiotics, Keflex, 500mg PO q8h, for a duration of 5 days.     Other infectious workup: Blood culture x2 NGTD > 96 hrs. UA negative for nitrite or leukocyte esterase. Hep C NR.         # Hyponatremia     Patient developed hyponatremia to 126 on 10/20, asymptomatic.  Serum osmolality 270, urine Na 83, urine osmolality 519, urine creatinine 58.9, consistent with SIADH. Patient treated with fluid restriction 1L, and sodium chloride tablets. BMP closely monitored.         Patient discharged home in stable condition. 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 History obtained from patient and family members (daughter and wife) at bedside using . States that 10 days ago, he began feeling very weak. Five days ago, the patient developed a painful rash on his nose, which began to crust. The night prior to admission, the patient had extreme pain of the nose, with swelling below the eyes, with difficulty breathing (states its more due to nasal congestion). He also endorsed severe headache and abdominal cramps, as well as nausea without vomiting. Ten days ago, he was prescribed Dexamethasone 2mg twice daily for ear discomfort (from 10/6 - 10/15, hasnt been on it since), as well as a multivitamin. He denies taking Dexamethasone before that. He denies fevers, chest pain, neck or throat pain, dysphagia, diarrhea/constipation, urinary symptoms, numbness/weakness of the arms, legs or neck, or neck swelling. He denies any sick contacts or recent travel. At this time, patient still has nose pain and weakness, however his other symptoms have improved.     In the ED, the patient's VS: T 99.7, HR 84, /71, RR 18, and SpO2 9        Hospital course:     # Abscess     On admission, patient meets sepsis criteria with HR>90 and leukocytosis. CT chest ruled out SVC syndrome. CT maxillofacial with IV contrast showed abscess involving nasal septum, measuring 3.7 cm x 1.8 cm x 3.1 cm, extends into the lateral nasal cartilage. ENT followed patient, s/p R septum I & D with minimal pus, mupirocin to R nare BID, afrin BID x 3 days. Infectious disease team followed patient. Patient initially empirically treated with clindamycin on 10/19, then broadened to vancomycin and zosyn from 10/19 to 10/22. Wound culture grew MSSA, antibiotics switched to Ancef 10/22-10/23. On 10/24, IV antibiotics switched to oral antibiotics, Keflex, 500mg PO q8h, for a duration of 5 days.     Other infectious workup: Blood culture x2 NGTD > 96 hrs. UA negative for nitrite or leukocyte esterase. Hep C NR.         # Hyponatremia     Patient developed hyponatremia to 126 on 10/20, asymptomatic.  Serum osmolality 270, urine Na 83, urine osmolality 519, urine creatinine 58.9, consistent with SIADH. Patient treated with fluid restriction 1L, and sodium chloride tablets. BMP closely monitored.         Patient discharged home in stable condition. Will continue with 5 day course of antibiotics. 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 History obtained from patient and family members (daughter and wife) at bedside using . States that 10 days ago, he began feeling very weak. Five days ago, the patient developed a painful rash on his nose, which began to crust. The night prior to admission, the patient had extreme pain of the nose, with swelling below the eyes, with difficulty breathing (states its more due to nasal congestion). He also endorsed severe headache and abdominal cramps, as well as nausea without vomiting. Ten days ago, he was prescribed Dexamethasone 2mg twice daily for ear discomfort (from 10/6 - 10/15, hasnt been on it since), as well as a multivitamin. He denies taking Dexamethasone before that. He denies fevers, chest pain, neck or throat pain, dysphagia, diarrhea/constipation, urinary symptoms, numbness/weakness of the arms, legs or neck, or neck swelling. He denies any sick contacts or recent travel. At this time, patient still has nose pain and weakness, however his other symptoms have improved.     In the ED, the patient's VS: T 99.7, HR 84, /71, RR 18, and SpO2 9        Hospital course:     # Abscess     On admission, patient meets sepsis criteria with HR>90 and leukocytosis. CT chest ruled out SVC syndrome. CT maxillofacial with IV contrast showed abscess involving nasal septum, measuring 3.7 cm x 1.8 cm x 3.1 cm, extends into the lateral nasal cartilage. ENT followed patient, s/p R septum I & D with minimal pus, mupirocin to R nare BID, afrin BID x 3 days. Infectious disease team followed patient. Patient initially empirically treated with clindamycin on 10/19, then broadened to vancomycin and zosyn from 10/19 to 10/22. Wound culture grew MSSA, antibiotics switched to Ancef 10/22-10/23. On 10/24, IV antibiotics switched to oral antibiotics, Keflex, 500mg PO q8h, for a duration of 5 days.     Other infectious workup: Blood culture x2 NGTD > 96 hrs. UA negative for nitrite or leukocyte esterase. Hep C NR.         # Hyponatremia     Patient developed hyponatremia to 126 on 10/20, asymptomatic.  Serum osmolality 270, urine Na 83, urine osmolality 519, urine creatinine 58.9, consistent with SIADH. Patient treated with fluid restriction 1L, and sodium chloride tablets. BMP closely monitored.         Patient discharged home in stable condition. Will continue with 5 day course of antibiotics. Will need to follow with OMFS and dental on discharge. Patient has a lytic mandibular lesion that was noted. and was picked up on prior admission however patient had declined intervention. Dental had followed the patient as well on this admission and patient had again declined intervention. Strongly urged to follow up as an o/p with both dental and OMFS clinic.

## 2019-10-22 NOTE — PROGRESS NOTE ADULT - PROBLEM SELECTOR PLAN 6
- generalized weakness that began with initiation of steroids 10 days ago   - hold steroids and statin to see if there is improvement in symptoms   - PT eval: likely home with no needs

## 2019-10-22 NOTE — PROGRESS NOTE ADULT - PROBLEM SELECTOR PLAN 3
-likely 2/2 inc nasal congestion  -Subjectively reports improved breathing. On exam, normal respiratory effort, no use of accessory muscles, lungs clear on ausculation, no wheezes.   -Currently satting well on RA  -continue to monitor off O2, treatment of sinusitis as stated above  -afrin BID x3 days per ENT

## 2019-10-22 NOTE — DISCHARGE NOTE PROVIDER - NSDCFUSCHEDAPPT_GEN_ALL_CORE_FT
LUIS BOOGIE ; 12/26/2019 ; [facility] [address] [place] [address] LUIS BOOGIE ; 12/26/2019 ; [facility] [address] [place] [address] LUIS BOOGIE ; 12/26/2019 ; [facility] [address] [place] [address] LUIS BOOGIE ; 12/26/2019 ; [facility] [address] [place] [address] LUIS BOOGIE ; 12/26/2019 ; [facility] [address] [place] [address] LUIS BOOGIE ; 12/26/2019 ; [facility] [address] [place] [address]

## 2019-10-22 NOTE — PROGRESS NOTE ADULT - PROBLEM SELECTOR PLAN 1
- meets sepsis criteria with HR>90 and leukocytosis (although leukocytosis may be from recent steroid use).   -unlikely to be angioedema, considering the lack of fever/nausea/vomiting/wheezes/respiratory difficulties or other systemic signs/symptoms, also patient has been on ACEi for a long time.   - CT chest ruled out SVC syndrome   - CT maxillofacial showed abscess involving nasal septum, measuring 3.7 cm x 1.8 cm x 3.1 cm, extends into the lateral nasal cartilage, s/p I & D by ENT.     Plan:   -  continue vancomycin + zosyn  -  f/u blood culture  -  f/u wound culture, + moderate Staph aureus   -  ENT following, appreciate recs. - meets sepsis criteria with HR>90 and leukocytosis (although leukocytosis may be from recent steroid use).   -unlikely to be angioedema, considering the lack of fever/nausea/vomiting/wheezes/respiratory difficulties or other systemic signs/symptoms, also patient has been on ACEi for a long time.   - CT chest ruled out SVC syndrome   - CT maxillofacial showed abscess involving nasal septum, measuring 3.7 cm x 1.8 cm x 3.1 cm, extends into the lateral nasal cartilage, s/p I & D by ENT.     Plan:   -  continue vancomycin + zosyn  -  f/u blood culture  -  f/u wound culture, + moderate Staph aureus   -  ENT following, appreciate recs.  - CT maxillofacial notable for "A nonspecific rounded lytic defect involving the left mandibular alveolus in the molar region." s/p dental consult, plan for oral x-ray, possible tooth extraction today. Medical optimization documented in chart note.

## 2019-10-22 NOTE — CHART NOTE - NSCHARTNOTEFT_GEN_A_CORE
Manuela Blum PGY-1   Resident Physician  [phone]/ [phone]      Patient is medically cleared to be transported off unit to receive dental x-ray.   Patient is currently on antibiotics, vancomycin and zosyn.   Patient is medically optimized to undergo dental procedure with local anesthetics.

## 2019-10-23 LAB
ANION GAP SERPL CALC-SCNC: 12 MMO/L — SIGNIFICANT CHANGE UP (ref 7–14)
BASOPHILS # BLD AUTO: 0.02 K/UL — SIGNIFICANT CHANGE UP (ref 0–0.2)
BASOPHILS NFR BLD AUTO: 0.2 % — SIGNIFICANT CHANGE UP (ref 0–2)
BUN SERPL-MCNC: 17 MG/DL — SIGNIFICANT CHANGE UP (ref 7–23)
CALCIUM SERPL-MCNC: 8.3 MG/DL — LOW (ref 8.4–10.5)
CHLORIDE SERPL-SCNC: 94 MMOL/L — LOW (ref 98–107)
CO2 SERPL-SCNC: 21 MMOL/L — LOW (ref 22–31)
CREAT SERPL-MCNC: 1.02 MG/DL — SIGNIFICANT CHANGE UP (ref 0.5–1.3)
EOSINOPHIL # BLD AUTO: 0.33 K/UL — SIGNIFICANT CHANGE UP (ref 0–0.5)
EOSINOPHIL NFR BLD AUTO: 2.6 % — SIGNIFICANT CHANGE UP (ref 0–6)
GLUCOSE SERPL-MCNC: 162 MG/DL — HIGH (ref 70–99)
HCT VFR BLD CALC: 34 % — LOW (ref 39–50)
HGB BLD-MCNC: 11.6 G/DL — LOW (ref 13–17)
IMM GRANULOCYTES NFR BLD AUTO: 0.5 % — SIGNIFICANT CHANGE UP (ref 0–1.5)
LYMPHOCYTES # BLD AUTO: 0.51 K/UL — LOW (ref 1–3.3)
LYMPHOCYTES # BLD AUTO: 4.1 % — LOW (ref 13–44)
MAGNESIUM SERPL-MCNC: 2 MG/DL — SIGNIFICANT CHANGE UP (ref 1.6–2.6)
MCHC RBC-ENTMCNC: 29.4 PG — SIGNIFICANT CHANGE UP (ref 27–34)
MCHC RBC-ENTMCNC: 34.1 % — SIGNIFICANT CHANGE UP (ref 32–36)
MCV RBC AUTO: 86.3 FL — SIGNIFICANT CHANGE UP (ref 80–100)
MONOCYTES # BLD AUTO: 0.76 K/UL — SIGNIFICANT CHANGE UP (ref 0–0.9)
MONOCYTES NFR BLD AUTO: 6 % — SIGNIFICANT CHANGE UP (ref 2–14)
NEUTROPHILS # BLD AUTO: 10.9 K/UL — HIGH (ref 1.8–7.4)
NEUTROPHILS NFR BLD AUTO: 86.6 % — HIGH (ref 43–77)
NRBC # FLD: 0 K/UL — SIGNIFICANT CHANGE UP (ref 0–0)
PHOSPHATE SERPL-MCNC: 2.9 MG/DL — SIGNIFICANT CHANGE UP (ref 2.5–4.5)
PLATELET # BLD AUTO: 219 K/UL — SIGNIFICANT CHANGE UP (ref 150–400)
PMV BLD: 10.1 FL — SIGNIFICANT CHANGE UP (ref 7–13)
POTASSIUM SERPL-MCNC: 3.5 MMOL/L — SIGNIFICANT CHANGE UP (ref 3.5–5.3)
POTASSIUM SERPL-SCNC: 3.5 MMOL/L — SIGNIFICANT CHANGE UP (ref 3.5–5.3)
RBC # BLD: 3.94 M/UL — LOW (ref 4.2–5.8)
RBC # FLD: 12.5 % — SIGNIFICANT CHANGE UP (ref 10.3–14.5)
SODIUM SERPL-SCNC: 127 MMOL/L — LOW (ref 135–145)
WBC # BLD: 12.58 K/UL — HIGH (ref 3.8–10.5)
WBC # FLD AUTO: 12.58 K/UL — HIGH (ref 3.8–10.5)

## 2019-10-23 PROCEDURE — 99232 SBSQ HOSP IP/OBS MODERATE 35: CPT

## 2019-10-23 PROCEDURE — 99232 SBSQ HOSP IP/OBS MODERATE 35: CPT | Mod: GC

## 2019-10-23 RX ORDER — DEXAMETHASONE 0.5 MG/5ML
1 ELIXIR ORAL
Qty: 0 | Refills: 0 | DISCHARGE

## 2019-10-23 RX ORDER — MUPIROCIN 20 MG/G
1 OINTMENT TOPICAL
Qty: 0 | Refills: 0 | DISCHARGE
Start: 2019-10-23

## 2019-10-23 RX ORDER — CEPHALEXIN 500 MG
500 CAPSULE ORAL EVERY 8 HOURS
Refills: 0 | Status: DISCONTINUED | OUTPATIENT
Start: 2019-10-24 | End: 2019-10-24

## 2019-10-23 RX ORDER — CEPHALEXIN 500 MG
1 CAPSULE ORAL
Qty: 15 | Refills: 0
Start: 2019-10-23 | End: 2019-10-27

## 2019-10-23 RX ADMIN — Medication 1: at 22:22

## 2019-10-23 RX ADMIN — INSULIN GLARGINE 9 UNIT(S): 100 INJECTION, SOLUTION SUBCUTANEOUS at 22:23

## 2019-10-23 RX ADMIN — OXYMETAZOLINE HYDROCHLORIDE 1 SPRAY(S): 0.5 SPRAY NASAL at 05:59

## 2019-10-23 RX ADMIN — Medication 1 SPRAY(S): at 01:29

## 2019-10-23 RX ADMIN — Medication 1 SPRAY(S): at 11:42

## 2019-10-23 RX ADMIN — Medication 1 TABLET(S): at 11:42

## 2019-10-23 RX ADMIN — Medication 1 SPRAY(S): at 05:59

## 2019-10-23 RX ADMIN — SODIUM CHLORIDE 1 GRAM(S): 9 INJECTION INTRAMUSCULAR; INTRAVENOUS; SUBCUTANEOUS at 11:42

## 2019-10-23 RX ADMIN — Medication 1: at 13:02

## 2019-10-23 RX ADMIN — Medication 81 MILLIGRAM(S): at 11:42

## 2019-10-23 RX ADMIN — Medication 100 MILLIGRAM(S): at 21:12

## 2019-10-23 RX ADMIN — MUPIROCIN 1 APPLICATION(S): 20 OINTMENT TOPICAL at 05:59

## 2019-10-23 RX ADMIN — Medication 100 MILLIGRAM(S): at 05:59

## 2019-10-23 RX ADMIN — Medication 1 SPRAY(S): at 17:10

## 2019-10-23 RX ADMIN — Medication 1 SPRAY(S): at 21:12

## 2019-10-23 RX ADMIN — ENOXAPARIN SODIUM 40 MILLIGRAM(S): 100 INJECTION SUBCUTANEOUS at 11:42

## 2019-10-23 RX ADMIN — MUPIROCIN 1 APPLICATION(S): 20 OINTMENT TOPICAL at 17:10

## 2019-10-23 RX ADMIN — Medication 100 MILLIGRAM(S): at 13:02

## 2019-10-23 RX ADMIN — Medication 1 UNIT(S): at 08:31

## 2019-10-23 RX ADMIN — AMLODIPINE BESYLATE 5 MILLIGRAM(S): 2.5 TABLET ORAL at 06:00

## 2019-10-23 NOTE — PROGRESS NOTE ADULT - PROBLEM SELECTOR PLAN 7
- A1C 7.1%  - FS uncontrolled, increase to lantus 6 units at bedtime (on 13 units at home)  - Low dose sliding scale corrective insulin TID with meals/ bed time  - POC finger stick TID pre-meal/bedtime - A1C 7.1%.  - Blood glucose better controlled last 24 hours: 10/22: 261---> 205--->162---> 118  10/23: 123     Plan:   -  Lantus 9 units bedtime  - Lispro 1 unit TID pre-meal   - POC finger stick TID pre-meal/bedtime

## 2019-10-23 NOTE — CONSULT NOTE ADULT - SUBJECTIVE AND OBJECTIVE BOX
Patient is a 69y old  Male who presents with a chief complaint of facial edema and pain (23 Oct 2019 07:48)      HPI:  70 y/o Hebrew speaking M with hx of DM, HTN, HLD, L parotid tumor treated with surgery (2019) and radiation (last rad ), CAD s/p pacemaker and stent 8 years ago, and CKD presents with face swelling for 1 day and weakness for 1 week, prior to which he was in his usual state of health. History obtained from patient and family members (daughter and wife) at bedside using . States that 10 days ago, he began feeling very weak. Five days ago, the patient developed a painful rash on his nose, which began to crust. The night prior to admission, the patient had extreme pain of the nose, with swelling below the eyes, with difficulty breathing (states its more due to nasal congestion). He also endorsed severe headache and abdominal cramps, as well as nausea without vomiting. Ten days ago, he was prescribed Dexamethasone 2mg twice daily for ear discomfort (from 10/6 - 10/15, hasnt been on it since), as well as a multivitamin. He denies taking Dexamethasone before that. He denies fevers, chest pain, neck or throat pain, dysphagia, diarrhea/constipation, urinary symptoms, numbness/weakness of the arms, legs or neck, or neck swelling. He denies any sick contacts or recent travel. At this time, patient still has nose pain and weakness, however his other symptoms have improved.     In the ED, the patient's VS: T 99.7, HR 84, /71, RR 18, and SpO2 99  Labs significant for leukocytosis to 19.26 with 16.53 Neutrophils, hyponatremia to 126, and Lactate 2.1 --> 1.7  Given Clindamycin in ED (19 Oct 2019 03:22)    Dental was consulted regarding CT finding of mandibular lytic defect.       PAST MEDICAL & SURGICAL HISTORY:  Tumor of parotid gland: right side  Cardiac pacemaker:   Coronary artery disease: s/p angiogram with stent   H/O gastroesophageal reflux (GERD)  Kidney disease  DM (diabetes mellitus)  Hypertension, unspecified type  Gout  DM (diabetes mellitus): x 16 years ; fs  19 ;  Kidney disease  HLD (hyperlipidemia)  HTN (hypertension)  Tumor of parotid gland: s/p resection  Cataract: Excisiion right eye  H/O cardiac pacemaker: 2014  History of cholecystectomy: 2009      MEDICATIONS  (STANDING):  amLODIPine   Tablet 5 milliGRAM(s) Oral daily  aspirin enteric coated 81 milliGRAM(s) Oral daily  ceFAZolin   IVPB 1000 milliGRAM(s) IV Intermittent every 8 hours  dextrose 5%. 1000 milliLiter(s) (50 mL/Hr) IV Continuous <Continuous>  dextrose 50% Injectable 12.5 Gram(s) IV Push once  dextrose 50% Injectable 25 Gram(s) IV Push once  dextrose 50% Injectable 25 Gram(s) IV Push once  enoxaparin Injectable 40 milliGRAM(s) SubCutaneous daily  insulin glargine Injectable (LANTUS) 9 Unit(s) SubCutaneous at bedtime  insulin lispro (HumaLOG) corrective regimen sliding scale   SubCutaneous three times a day before meals  insulin lispro (HumaLOG) corrective regimen sliding scale   SubCutaneous at bedtime  insulin lispro Injectable (HumaLOG) 1 Unit(s) SubCutaneous three times a day before meals  lactobacillus acidophilus 1 Tablet(s) Oral daily  mupirocin 2% Ointment 1 Application(s) Topical two times a day  sodium chloride 1 Gram(s) Oral daily  sodium chloride 0.65% Nasal 1 Spray(s) Both Nostrils every 4 hours    MEDICATIONS  (PRN):  dextrose 40% Gel 15 Gram(s) Oral once PRN Blood Glucose LESS THAN 70 milliGRAM(s)/deciliter  glucagon  Injectable 1 milliGRAM(s) IntraMuscular once PRN Glucose LESS THAN 70 milligrams/deciliter      Allergies    No Known Allergies    Intolerances        FAMILY HISTORY:  Family history of oral cancer: brother       *SOCIAL HISTORY: presented to [facility] with wife    *Last Dental Visit: unknown    Vital Signs Last 24 Hrs  T(C): 36.9 (23 Oct 2019 05:55), Max: 37.2 (22 Oct 2019 13:55)  T(F): 98.4 (23 Oct 2019 05:55), Max: 98.9 (22 Oct 2019 13:55)  HR: 77 (23 Oct 2019 05:55) (77 - 82)  BP: 127/64 (23 Oct 2019 05:55) (120/60 - 138/60)  BP(mean): --  RR: 17 (23 Oct 2019 05:55) (17 - 18)  SpO2: 100% (23 Oct 2019 05:55) (95% - 100%)    EOE:  TMJ ( -  ) clicks                    ( -   ) pops                    ( -   ) crepitus             Mandible FROM             Facial bones and MOM grossly intact             ( -  ) trismus             ( -  ) LAD             ( +  ) swelling- on right cheek             ( +  ) asymmetry- on right cheek             ( +  ) palpation- on right cheek             ( -  ) SOB             ( -  ) dysphagia             ( -  ) LOC    IOE:  permanent dentition: intact           hard/soft palate:  ( -  ) palatal torus           tongue/FOM WNL           labial/buccal mucosa WNL           ( -  ) percussion           ( -  ) palpation           ( -  ) swelling         *DENTAL RADIOGRAPHS: None obtained at limited bedside exam. Patient was seen in [facility] in 19 where a panograph and 2PAs were taken and reviewed    ASSESSMENT: CT scan reveals lytic defect- pan shows large radiolucency apical to #18 with scalloped borders, bilateral condyles in fossa- biopsy of the lesion was indicated however patient declined.      PROCEDURE:  Limited bedside exam performed. Patient was consulted by [facility] for pre-radiation dental clearance. Patient was advised Exo #14,15,16,17,18,19,31 and biopsy lesion #18 under LA. However, patient declined dental treatment and the biopsy at the time. Patient informed of the risks and comorbidities at the time of consult. Patient understood and has yet to follow up with [facility] dental clinic. Unknown if patient received radiation. Discussed with patient's medical team, patient can return to [facility] dental clinic following discharge for consult.       RECOMMENDATIONS:  1) Dental F/U with [facility] for consult.   2) Dental F/U with outpatient dentist for comprehensive dental care.   3) If any difficulty swallowing/breathing, fever occur, page dental.     Ryanne Toro DDS, Audrey Underwood DDS, pager #[phone]

## 2019-10-23 NOTE — PROGRESS NOTE ADULT - PROBLEM SELECTOR PLAN 5
- s/p resection and radiation   - s/p ENT consultation, appreciate recs,   - pending CT to evaluate for tumor recurrence - s/p resection and radiation   - s/p ENT consultation, appreciate recs,

## 2019-10-23 NOTE — PROGRESS NOTE ADULT - SUBJECTIVE AND OBJECTIVE BOX
Patient seen and examined. No acute events  Nasal packing changed at bedside  Nasal swelling with interval improvement  Abx changed to ancef    Vital Signs Last 24 Hrs  T(C): 36.9 (23 Oct 2019 05:55), Max: 37.2 (22 Oct 2019 13:55)  T(F): 98.4 (23 Oct 2019 05:55), Max: 98.9 (22 Oct 2019 13:55)  HR: 77 (23 Oct 2019 05:55) (77 - 88)  BP: 127/64 (23 Oct 2019 05:55) (120/60 - 138/60)  BP(mean): --  RR: 17 (23 Oct 2019 05:55) (17 - 18)  SpO2: 100% (23 Oct 2019 05:55) (95% - 100%)  NAD, awake and alert  Breathing comfortably on RA  Face: swelling on right improved from yesterday  Nose: moderate swelling and induration of the nasal tip with macerated skin (significant improvement), mucoid/purulent fluid debrided from anterior nasal cavity, right nasal septum with patent incision, packing, min serosanginous exudate expressed from wound   OC/OP: poor dentition  Neck soft/flat    Cx: staph aureus    A/P  68 y/o Danish speaking M with hx of DM, HTN, HLD, L parotid tumor s/p L parotid, SND 2-4, cervicofacial flap 4/17, CAD s/p pacemaker and stent 8 years ago, and CKD presented 10/19 with infection of nose/face, now with nasal abscess s/p I&D of right septum  -abx per ID  -daily packing for 2 more days - VNS can perform - use 1/4 packing and pack right nasal septum with a cotton tip, secure with tape  -strict glucose control  -mupirocin to right nare BID  -warm compresses q4hr to nose  -nasal saline sprays q4h while awake  -okay to DC from ORL perspective with VNS  -call/page with questions

## 2019-10-23 NOTE — PROGRESS NOTE ADULT - PROBLEM SELECTOR PLAN 1
- meets sepsis criteria with HR>90 and leukocytosis (although leukocytosis may be from recent steroid use).   -unlikely to be angioedema, considering the lack of fever/nausea/vomiting/wheezes/respiratory difficulties or other systemic signs/symptoms, also patient has been on ACEi for a long time.   - CT chest ruled out SVC syndrome   - CT maxillofacial showed abscess involving nasal septum, measuring 3.7 cm x 1.8 cm x 3.1 cm, extends into the lateral nasal cartilage, s/p I & D by ENT.     Plan:   -  continue vancomycin + zosyn  -  f/u blood culture  -  f/u wound culture, + moderate Staph aureus   -  ENT following, appreciate recs.  - CT maxillofacial notable for "A nonspecific rounded lytic defect involving the left mandibular alveolus in the molar region." s/p dental consult, plan for oral x-ray, possible tooth extraction today. Medical optimization documented in chart note. On admission meets sepsis criteria with HR>90 and leukocytosis.   - CT chest ruled out SVC syndrome   - CT maxillofacial showed abscess involving nasal septum, measuring 3.7 cm x 1.8 cm x 3.1 cm, extends into the lateral nasal cartilage, s/p I & D by ENT.   - wound culture MSSA   - Blood culture x2 NGTD (10/20) > 48 hrs.     Plan:   -  Discontinued vanc+ zosyn. Started ancef 10/22.   -  ENT following, appreciate recs.  - CT maxillofacial notable for "A nonspecific rounded lytic defect involving the left mandibular alveolus in the molar region." s/p dental consult, plan for oral x-ray, possible tooth extraction 10/23. Medical optimization documented in chart note.

## 2019-10-23 NOTE — PROGRESS NOTE ADULT - ASSESSMENT
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   anterior nasal septal abscess.  s/p I&D with ENT 10/21.  Wound packing in place.  Culture of abscess MSSA.  Blood cultures x 2 negative x 72 h    Suggest  - can transition to keflex 500 mg po q 8 h x 5 days  -wound care per ENT    Kristin Owen MD  Pager: [phone]  After 5 PM or weekends please call fellow on call or office [phone]

## 2019-10-23 NOTE — PROGRESS NOTE ADULT - ASSESSMENT
68 y/o Faroese speaking M with hx of DM, HTN, HLD, L parotid tumor treated with surgery (April 2019) and radiation (last rad August 16), CAD s/p pacemaker and stent 8 years ago, and CKD2 presents with face swelling in the setting of recent steroid use, crusted lesions on nose, and weakness, CT maxillofacial showed abscess involving nasal septum, measuring 3.7 cm x 1.8 cm x 3.1 cm, extends into the lateral nasal cartilage, s/p I & D by ENT.

## 2019-10-23 NOTE — PROGRESS NOTE ADULT - SUBJECTIVE AND OBJECTIVE BOX
Follow Up: Nasal septum abscess    Interval History/ROS: s/p I&D 10/21 by ENT. Feels ok today. difficulty eating.   no fever, chills.  rest of ROS neg    Allergies  No Known Allergies    ANTIMICROBIALS:  ceFAZolin   IVPB 1000 every 8 hours        OTHER MEDS:  amLODIPine   Tablet 5 milliGRAM(s) Oral daily  aspirin enteric coated 81 milliGRAM(s) Oral daily  dextrose 40% Gel 15 Gram(s) Oral once PRN  dextrose 5%. 1000 milliLiter(s) IV Continuous <Continuous>  dextrose 50% Injectable 12.5 Gram(s) IV Push once  dextrose 50% Injectable 25 Gram(s) IV Push once  dextrose 50% Injectable 25 Gram(s) IV Push once  enoxaparin Injectable 40 milliGRAM(s) SubCutaneous daily  glucagon  Injectable 1 milliGRAM(s) IntraMuscular once PRN  insulin glargine Injectable (LANTUS) 9 Unit(s) SubCutaneous at bedtime  insulin lispro (HumaLOG) corrective regimen sliding scale   SubCutaneous three times a day before meals  insulin lispro (HumaLOG) corrective regimen sliding scale   SubCutaneous at bedtime  insulin lispro Injectable (HumaLOG) 1 Unit(s) SubCutaneous three times a day before meals  lactobacillus acidophilus 1 Tablet(s) Oral daily  mupirocin 2% Ointment 1 Application(s) Topical two times a day  oxymetazoline 0.05% Nasal Spray 1 Spray(s) Both Nostrils two times a day  sodium chloride 1 Gram(s) Oral daily  sodium chloride 0.65% Nasal 1 Spray(s) Both Nostrils every 4 hours    Vital Signs Last 24 Hrs  T(F): 97.9 (10-23-19 @ 13:01), Max: 98.8 (10-22-19 @ 21:29)  HR: 83 (10-23-19 @ 13:01)  BP: 128/54 (10-23-19 @ 13:01)  RR: 15 (10-23-19 @ 13:01)  SpO2: 100% (10-23-19 @ 13:01) (98% - 100%)    Physical Exam:  General: sitting in chair, non toxic, thin  Head: atraumatic, normocephalic  Eye: normal sclera and conjunctiva  ENT: packing right side nose, no erythema face   Cardio: regular rate and rhythm   Respiratory: nonlabored, clear bilaterally, no wheezing  abd: soft, BS +, no tenderness  Musculoskeletal: no joint swelling, no edema  vascular: no phlebitis   Skin: no rash  Neurologic: no focal deficit                                     11.6   12.58 )-----------( 219      ( 23 Oct 2019 06:18 )             34.0 10-23    127  |  94  |  17  ----------------------------<  162  3.5   |  21  |  1.02  Ca    8.3      23 Oct 2019 06:18Phos  2.9     10-23Mg     2.0     10-23      MICROBIOLOGY:  Culture - Blood (10.20.19 @ 16:16)    Culture - Blood:   NO ORGANISMS ISOLATED  NO ORGANISMS ISOLATED AT 72 HRS.    Specimen Source: BLOOD VENOUS    Culture - Blood (10.20.19 @ 16:16)    Culture - Blood:   NO ORGANISMS ISOLATED  NO ORGANISMS ISOLATED AT 72 HRS.    Specimen Source: BLOOD PERIPHERAL        Culture - Wound (collected 10-20-19 @ 11:16)  Source: OTHER  Final Report (10-22-19 @ 14:00):    FEW  Organism: Staphylococcus aureus (10-22-19 @ 14:00)  Organism: Staphylococcus aureus (10-22-19 @ 14:00)      -  Cefazolin: S <=4 JENNIFER      -  Ciprofloxacin: R >2 JENNIFER      -  Clindamycin: R This isolate is presumed to be resistant on the basis of  detection of inducible Clindamycin resistance.  Clindamycin  still might be effective in some patients.      -  Erythromycin: R >4 JENNIFER      -  Gentamicin: S <=1 JENNIFER      -  Levofloxacin: R >4 JENNIFER      -  Moxifloxacin(Aerobic): R 4 JENNIFER      -  Oxacillin: S 1 JENNIFER      -  Penicillin: R >8 JENNIFER      -  Rifampin: S <=1 JENNIFER      -  Tetra/Doxy: S <=1 JENNIFER      -  Trimethoprim/Sulfamethoxazole: S 1/19 JENNIFER      -  Vancomycin: S 2 JENNIFER      Method Type: POSITIVE JENNIFER 29    Culture - Urine (collected 10-20-19 @ 08:48)  Source: URINE MIDSTREAM  Final Report (10-20-19 @ 08:56):    NO GROWTH AT 24 HOURS    Culture - Blood (collected 10-20-19 @ 07:05)  Source: BLOOD PERIPHERAL  Preliminary Report (10-22-19 @ 07:05):    NO ORGANISMS ISOLATED    NO ORGANISMS ISOLATED AT 48 HRS.    Culture - Wound (collected 10-19-19 @ 08:24)  Source: OTHER  Final Report (10-22-19 @ 13:49):    MODERATE  Organism: Staphylococcus aureus (10-22-19 @ 13:49)  Organism: Staphylococcus aureus (10-22-19 @ 13:49)      -  Cefazolin: S <=4 JENNIFER      -  Ciprofloxacin: R >2 JENNIFER      -  Clindamycin: R This isolate is presumed to be resistant on the basis of  detection of inducible Clindamycin resistance.  Clindamycin  still might be effective in some patients.      -  Erythromycin: R >4 JENNIFER      -  Gentamicin: S <=1 JENNIFER      -  Levofloxacin: R >4 JENNIFER      -  Moxifloxacin(Aerobic): R 4 JENNIFER      -  Oxacillin: S 1 JENNIFER      -  Penicillin: R >8 JENNIFER      -  Rifampin: S <=1 JENNIFER      -  Tetra/Doxy: S <=1 JENNIFER      -  Trimethoprim/Sulfamethoxazole: S 1/19 JENNIFER      -  Vancomycin: S 2 JENNIFER      Method Type: POSITIVE JENNIFER 29    RADIOLOGY:    CT Maxillofacial w/ IV Cont (10.20.19 @ 11:58)   A large anterior nasal septal abscess is noted as described.  Evolving postoperative changes are again noted status post resection of left parotid mass, with associated surgical clips. The nonspecific soft tissue within the operative bed is similar compared to the [id number]/2019 PET/CT study.  There has been interval increase in size of the bilateral level 1B cervical lymph nodes. Although these could reflect reactive nodes, pathologic nodes can't be excluded.  A nonspecific rounded lytic defect involving the left mandibular alveolus in the molar region is stable compared to the prior study. Correlate for dental extraction tooth socket, osteomyelitis, or underlying mass.

## 2019-10-23 NOTE — PROGRESS NOTE ADULT - PROBLEM SELECTOR PLAN 2
Likely SIADH combined with hypovolemia.   Na trend: 124--> 127--> 130 ---> 128 ---> 126    Plan:   - continue NaCl 1g daily   - continue monitoring BMP BID Likely SIADH combined with hypovolemia.   Na trend: 124--> 127--> 130 ---> 128 ---> 126-->127    Plan:   - continue NaCl 1g daily   - continue monitoring BMP BID

## 2019-10-23 NOTE — PROGRESS NOTE ADULT - SUBJECTIVE AND OBJECTIVE BOX
Manuela Kulwant   PGY-1 Resident Physician   Pager [phone] from 7am to 7pm, after 7pm page night float     Patient is a 69y old  Male who presents with a chief complaint of facial edema and pain (22 Oct 2019 18:02)      SUBJECTIVE / OVERNIGHT EVENTS: No acute event overnight. This morning, patient comfortable, denies chest pain/shortness of breath/abdominal pain, complains of nose pain.     MEDICATIONS  (STANDING):  amLODIPine   Tablet 5 milliGRAM(s) Oral daily  aspirin enteric coated 81 milliGRAM(s) Oral daily  ceFAZolin   IVPB 1000 milliGRAM(s) IV Intermittent every 8 hours  dextrose 5%. 1000 milliLiter(s) (50 mL/Hr) IV Continuous <Continuous>  dextrose 50% Injectable 12.5 Gram(s) IV Push once  dextrose 50% Injectable 25 Gram(s) IV Push once  dextrose 50% Injectable 25 Gram(s) IV Push once  enoxaparin Injectable 40 milliGRAM(s) SubCutaneous daily  insulin glargine Injectable (LANTUS) 9 Unit(s) SubCutaneous at bedtime  insulin lispro (HumaLOG) corrective regimen sliding scale   SubCutaneous three times a day before meals  insulin lispro (HumaLOG) corrective regimen sliding scale   SubCutaneous at bedtime  insulin lispro Injectable (HumaLOG) 1 Unit(s) SubCutaneous three times a day before meals  lactobacillus acidophilus 1 Tablet(s) Oral daily  mupirocin 2% Ointment 1 Application(s) Topical two times a day  sodium chloride 1 Gram(s) Oral daily  sodium chloride 0.65% Nasal 1 Spray(s) Both Nostrils every 4 hours    MEDICATIONS  (PRN):  dextrose 40% Gel 15 Gram(s) Oral once PRN Blood Glucose LESS THAN 70 milliGRAM(s)/deciliter  glucagon  Injectable 1 milliGRAM(s) IntraMuscular once PRN Glucose LESS THAN 70 milligrams/deciliter    Allergies  No Known Allergies    Vital Signs Last 24 Hrs  T(C): 36.9 (23 Oct 2019 05:55), Max: 37.2 (22 Oct 2019 13:55)  T(F): 98.4 (23 Oct 2019 05:55), Max: 98.9 (22 Oct 2019 13:55)  HR: 77 (23 Oct 2019 05:55) (77 - 88)  BP: 127/64 (23 Oct 2019 05:55) (120/60 - 138/60)  BP(mean): --  RR: 17 (23 Oct 2019 05:55) (17 - 18)  SpO2: 100% (23 Oct 2019 05:55) (95% - 100%)  Daily     Daily   CAPILLARY BLOOD GLUCOSE      POCT Blood Glucose.: 118 mg/dL (22 Oct 2019 22:21)  POCT Blood Glucose.: 162 mg/dL (22 Oct 2019 17:07)  POCT Blood Glucose.: 205 mg/dL (22 Oct 2019 12:33)  POCT Blood Glucose.: 261 mg/dL (22 Oct 2019 08:44)    I&O's Summary      PHYSICAL EXAM:  GENERAL: NAD, well-developed  HEAD:  Atraumatic, Normocephalic  EYES: EOMI, PERRLA, conjunctiva and sclera clear  NECK: Supple, No JVD  CHEST/LUNG: Clear to auscultation bilaterally; No wheeze  HEART: Regular rate and rhythm; Normal S1 S2, No murmurs, rubs, or gallops  ABDOMEN: Soft, Nontender, Nondistended; Bowel sounds present  EXTREMITIES:  2+ Peripheral Pulses, No clubbing, cyanosis, or edema  PSYCH: AAOx3  NEUROLOGY: non-focal  SKIN: No rashes or lesions    LABS:                        12.4   15.62 )-----------( 213      ( 22 Oct 2019 06:15 )             35.7     Hgb Trend: 12.4<--, 12.5<--, 12.5<--, 13.8<--, 14.3<--  10-22    127<L>  |  93<L>  |  16  ----------------------------<  159<H>  4.0   |  23  |  1.07    Ca    8.3<L>      22 Oct 2019 17:58  Phos  2.4     10-22  Mg     1.8     10-22      Creatinine Trend: 1.07<--, 0.93<--, 1.03<--, 1.06<--, 1.25<--, 1.15<--    PT/INR - ( 22 Oct 2019 06:15 )   PT: 13.0 SEC;   INR: 1.17 Manuelamayra Blum   PGY-1 Resident Physician   Pager [phone] from 7am to 7pm, after 7pm page night float     Patient is a 69y old  Male who presents with a chief complaint of facial edema and pain (22 Oct 2019 18:02)    SUBJECTIVE / OVERNIGHT EVENTS: No acute event overnight. This morning, patient comfortable, complains of nose pain (though decreased severity from yesterday), complains of decreased occipital headache and mild abdominal pain, denies chest pain/shortness of breath.     MEDICATIONS  (STANDING):  amLODIPine   Tablet 5 milliGRAM(s) Oral daily  aspirin enteric coated 81 milliGRAM(s) Oral daily  ceFAZolin   IVPB 1000 milliGRAM(s) IV Intermittent every 8 hours  dextrose 5%. 1000 milliLiter(s) (50 mL/Hr) IV Continuous <Continuous>  dextrose 50% Injectable 12.5 Gram(s) IV Push once  dextrose 50% Injectable 25 Gram(s) IV Push once  dextrose 50% Injectable 25 Gram(s) IV Push once  enoxaparin Injectable 40 milliGRAM(s) SubCutaneous daily  insulin glargine Injectable (LANTUS) 9 Unit(s) SubCutaneous at bedtime  insulin lispro (HumaLOG) corrective regimen sliding scale   SubCutaneous three times a day before meals  insulin lispro (HumaLOG) corrective regimen sliding scale   SubCutaneous at bedtime  insulin lispro Injectable (HumaLOG) 1 Unit(s) SubCutaneous three times a day before meals  lactobacillus acidophilus 1 Tablet(s) Oral daily  mupirocin 2% Ointment 1 Application(s) Topical two times a day  sodium chloride 1 Gram(s) Oral daily  sodium chloride 0.65% Nasal 1 Spray(s) Both Nostrils every 4 hours    MEDICATIONS  (PRN):  dextrose 40% Gel 15 Gram(s) Oral once PRN Blood Glucose LESS THAN 70 milliGRAM(s)/deciliter  glucagon  Injectable 1 milliGRAM(s) IntraMuscular once PRN Glucose LESS THAN 70 milligrams/deciliter    Allergies  No Known Allergies    Vital Signs Last 24 Hrs  T(C): 36.9 (23 Oct 2019 05:55), Max: 37.2 (22 Oct 2019 13:55)  T(F): 98.4 (23 Oct 2019 05:55), Max: 98.9 (22 Oct 2019 13:55)  HR: 77 (23 Oct 2019 05:55) (77 - 88)  BP: 127/64 (23 Oct 2019 05:55) (120/60 - 138/60)  BP(mean): --  RR: 17 (23 Oct 2019 05:55) (17 - 18)  SpO2: 100% (23 Oct 2019 05:55) (95% - 100%)  Daily     Daily   CAPILLARY BLOOD GLUCOSE      POCT Blood Glucose.: 118 mg/dL (22 Oct 2019 22:21)  POCT Blood Glucose.: 162 mg/dL (22 Oct 2019 17:07)  POCT Blood Glucose.: 205 mg/dL (22 Oct 2019 12:33)  POCT Blood Glucose.: 261 mg/dL (22 Oct 2019 08:44)      PHYSICAL EXAM:  General: Patient sitting up in bed, more alert, in no acute distress  Neuro: alert, oriented x3   HEENT: R nose s/p I & D, packing inserted, dry blood at edge of nose. PERRL , moist mucous membrane, clear oral pharynx  CV: regular rate/rhythm, no murmur/gallop/rubs  Pulm: lungs clear to ausculation bilaterally, no wheezes/crackles  Abd: soft, nondistended, not tender to palpation   Extremities: no edema bilateral lower extremities  Skin: No rashes   Psych: normal affect , no agitation     LABS:                        12.4   15.62 )-----------( 213      ( 22 Oct 2019 06:15 )             35.7     Hgb Trend: 12.4<--, 12.5<--, 12.5<--, 13.8<--, 14.3<--  10-22    127<L>  |  93<L>  |  16  ----------------------------<  159<H>  4.0   |  23  |  1.07    Ca    8.3<L>      22 Oct 2019 17:58  Phos  2.4     10-22  Mg     1.8     10-22      Creatinine Trend: 1.07<--, 0.93<--, 1.03<--, 1.06<--, 1.25<--, 1.15<--    PT/INR - ( 22 Oct 2019 06:15 )   PT: 13.0 SEC;   INR: 1.17

## 2019-10-23 NOTE — PROGRESS NOTE ADULT - ATTENDING COMMENTS
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 Pt admitted for Sepsis 2/2 nasal abscess and sinusitis.    Sepsis due to sinusitis and abscess ( fever, tachycardia and leukocytosis) not present on admission, pt infection progressed. c/w Ancef for MSSA, f/u ID recs, I&D per ENT. ENT cleared pt for dc, will f/u ID plan.     Mandible lytic bone lesion: Dental evaluated pt, pt refusing dental workup at this time, plan for pt to follow up as an outpt.    Hyponatremia: likely due to SIADH in the setting of parotid tumor, salt tabs and fluid restriction, Trend Na    L parotid tumor: s/p ENT eval, ENT/Onc f/u outpt.

## 2019-10-24 ENCOUNTER — TRANSCRIPTION ENCOUNTER (OUTPATIENT)
Age: 69
End: 2019-10-24

## 2019-10-24 VITALS
TEMPERATURE: 98 F | RESPIRATION RATE: 16 BRPM | SYSTOLIC BLOOD PRESSURE: 119 MMHG | HEART RATE: 81 BPM | DIASTOLIC BLOOD PRESSURE: 65 MMHG | OXYGEN SATURATION: 100 %

## 2019-10-24 DIAGNOSIS — R93.7 ABNORMAL FINDINGS ON DIAGNOSTIC IMAGING OF OTHER PARTS OF MUSCULOSKELETAL SYSTEM: ICD-10-CM

## 2019-10-24 LAB
ANION GAP SERPL CALC-SCNC: 15 MMO/L — HIGH (ref 7–14)
BASOPHILS # BLD AUTO: 0.03 K/UL — SIGNIFICANT CHANGE UP (ref 0–0.2)
BASOPHILS NFR BLD AUTO: 0.3 % — SIGNIFICANT CHANGE UP (ref 0–2)
BUN SERPL-MCNC: 18 MG/DL — SIGNIFICANT CHANGE UP (ref 7–23)
CALCIUM SERPL-MCNC: 8.5 MG/DL — SIGNIFICANT CHANGE UP (ref 8.4–10.5)
CHLORIDE SERPL-SCNC: 94 MMOL/L — LOW (ref 98–107)
CO2 SERPL-SCNC: 18 MMOL/L — LOW (ref 22–31)
CREAT SERPL-MCNC: 0.95 MG/DL — SIGNIFICANT CHANGE UP (ref 0.5–1.3)
EOSINOPHIL # BLD AUTO: 0.13 K/UL — SIGNIFICANT CHANGE UP (ref 0–0.5)
EOSINOPHIL NFR BLD AUTO: 1.2 % — SIGNIFICANT CHANGE UP (ref 0–6)
GLUCOSE SERPL-MCNC: 179 MG/DL — HIGH (ref 70–99)
HCT VFR BLD CALC: 38.9 % — LOW (ref 39–50)
HGB BLD-MCNC: 12.6 G/DL — LOW (ref 13–17)
IMM GRANULOCYTES NFR BLD AUTO: 0.8 % — SIGNIFICANT CHANGE UP (ref 0–1.5)
LYMPHOCYTES # BLD AUTO: 0.38 K/UL — LOW (ref 1–3.3)
LYMPHOCYTES # BLD AUTO: 3.5 % — LOW (ref 13–44)
MAGNESIUM SERPL-MCNC: 1.9 MG/DL — SIGNIFICANT CHANGE UP (ref 1.6–2.6)
MCHC RBC-ENTMCNC: 28.9 PG — SIGNIFICANT CHANGE UP (ref 27–34)
MCHC RBC-ENTMCNC: 32.4 % — SIGNIFICANT CHANGE UP (ref 32–36)
MCV RBC AUTO: 89.2 FL — SIGNIFICANT CHANGE UP (ref 80–100)
MONOCYTES # BLD AUTO: 0.57 K/UL — SIGNIFICANT CHANGE UP (ref 0–0.9)
MONOCYTES NFR BLD AUTO: 5.2 % — SIGNIFICANT CHANGE UP (ref 2–14)
NEUTROPHILS # BLD AUTO: 9.78 K/UL — HIGH (ref 1.8–7.4)
NEUTROPHILS NFR BLD AUTO: 89 % — HIGH (ref 43–77)
NRBC # FLD: 0 K/UL — SIGNIFICANT CHANGE UP (ref 0–0)
PHOSPHATE SERPL-MCNC: 3.1 MG/DL — SIGNIFICANT CHANGE UP (ref 2.5–4.5)
PLATELET # BLD AUTO: 222 K/UL — SIGNIFICANT CHANGE UP (ref 150–400)
PMV BLD: 10.3 FL — SIGNIFICANT CHANGE UP (ref 7–13)
POTASSIUM SERPL-MCNC: 4 MMOL/L — SIGNIFICANT CHANGE UP (ref 3.5–5.3)
POTASSIUM SERPL-SCNC: 4 MMOL/L — SIGNIFICANT CHANGE UP (ref 3.5–5.3)
RBC # BLD: 4.36 M/UL — SIGNIFICANT CHANGE UP (ref 4.2–5.8)
RBC # FLD: 12.3 % — SIGNIFICANT CHANGE UP (ref 10.3–14.5)
SODIUM SERPL-SCNC: 127 MMOL/L — LOW (ref 135–145)
WBC # BLD: 10.96 K/UL — HIGH (ref 3.8–10.5)
WBC # FLD AUTO: 10.96 K/UL — HIGH (ref 3.8–10.5)

## 2019-10-24 PROCEDURE — 99239 HOSP IP/OBS DSCHRG MGMT >30: CPT

## 2019-10-24 RX ORDER — INSULIN GLARGINE 100 [IU]/ML
10 INJECTION, SOLUTION SUBCUTANEOUS
Qty: 5 | Refills: 0
Start: 2019-10-24

## 2019-10-24 RX ORDER — CEPHALEXIN 500 MG
1 CAPSULE ORAL
Qty: 15 | Refills: 0
Start: 2019-10-24 | End: 2019-10-28

## 2019-10-24 RX ORDER — INSULIN GLARGINE 100 [IU]/ML
13 INJECTION, SOLUTION SUBCUTANEOUS
Qty: 0 | Refills: 0 | DISCHARGE

## 2019-10-24 RX ORDER — LISINOPRIL 2.5 MG/1
1 TABLET ORAL
Qty: 0 | Refills: 0 | DISCHARGE

## 2019-10-24 RX ORDER — ACETAMINOPHEN 500 MG
650 TABLET ORAL ONCE
Refills: 0 | Status: COMPLETED | OUTPATIENT
Start: 2019-10-24 | End: 2019-10-24

## 2019-10-24 RX ORDER — SODIUM CHLORIDE 9 MG/ML
1 INJECTION INTRAMUSCULAR; INTRAVENOUS; SUBCUTANEOUS
Refills: 0 | Status: DISCONTINUED | OUTPATIENT
Start: 2019-10-24 | End: 2019-10-24

## 2019-10-24 RX ORDER — SODIUM CHLORIDE 9 MG/ML
1 INJECTION INTRAMUSCULAR; INTRAVENOUS; SUBCUTANEOUS
Qty: 60 | Refills: 0
Start: 2019-10-24 | End: 2019-11-22

## 2019-10-24 RX ORDER — SODIUM CHLORIDE 0.65 %
1 AEROSOL, SPRAY (ML) NASAL
Qty: 1 | Refills: 0
Start: 2019-10-24

## 2019-10-24 RX ORDER — MUPIROCIN 20 MG/G
1 OINTMENT TOPICAL
Qty: 1 | Refills: 0
Start: 2019-10-24

## 2019-10-24 RX ADMIN — Medication 500 MILLIGRAM(S): at 13:23

## 2019-10-24 RX ADMIN — Medication 1 SPRAY(S): at 06:38

## 2019-10-24 RX ADMIN — MUPIROCIN 1 APPLICATION(S): 20 OINTMENT TOPICAL at 17:32

## 2019-10-24 RX ADMIN — Medication 2: at 17:32

## 2019-10-24 RX ADMIN — Medication 1: at 12:33

## 2019-10-24 RX ADMIN — MUPIROCIN 1 APPLICATION(S): 20 OINTMENT TOPICAL at 06:37

## 2019-10-24 RX ADMIN — SODIUM CHLORIDE 1 GRAM(S): 9 INJECTION INTRAMUSCULAR; INTRAVENOUS; SUBCUTANEOUS at 17:33

## 2019-10-24 RX ADMIN — Medication 1 SPRAY(S): at 17:32

## 2019-10-24 RX ADMIN — Medication 1: at 08:48

## 2019-10-24 RX ADMIN — Medication 500 MILLIGRAM(S): at 06:37

## 2019-10-24 RX ADMIN — AMLODIPINE BESYLATE 5 MILLIGRAM(S): 2.5 TABLET ORAL at 06:36

## 2019-10-24 RX ADMIN — Medication 650 MILLIGRAM(S): at 09:31

## 2019-10-24 RX ADMIN — Medication 1 SPRAY(S): at 13:23

## 2019-10-24 RX ADMIN — ENOXAPARIN SODIUM 40 MILLIGRAM(S): 100 INJECTION SUBCUTANEOUS at 12:34

## 2019-10-24 RX ADMIN — Medication 1 SPRAY(S): at 08:49

## 2019-10-24 RX ADMIN — Medication 81 MILLIGRAM(S): at 12:34

## 2019-10-24 RX ADMIN — Medication 1 TABLET(S): at 12:34

## 2019-10-24 NOTE — PROGRESS NOTE ADULT - SUBJECTIVE AND OBJECTIVE BOX
Patient is a 69y old  Male who presents with a chief complaint of facial edema and pain (24 Oct 2019 04:51)        SUBJECTIVE / OVERNIGHT EVENTS:      MEDICATIONS  (STANDING):  amLODIPine   Tablet 5 milliGRAM(s) Oral daily  aspirin enteric coated 81 milliGRAM(s) Oral daily  cephalexin 500 milliGRAM(s) Oral every 8 hours  dextrose 5%. 1000 milliLiter(s) (50 mL/Hr) IV Continuous <Continuous>  dextrose 50% Injectable 12.5 Gram(s) IV Push once  dextrose 50% Injectable 25 Gram(s) IV Push once  dextrose 50% Injectable 25 Gram(s) IV Push once  enoxaparin Injectable 40 milliGRAM(s) SubCutaneous daily  insulin glargine Injectable (LANTUS) 9 Unit(s) SubCutaneous at bedtime  insulin lispro (HumaLOG) corrective regimen sliding scale   SubCutaneous three times a day before meals  insulin lispro (HumaLOG) corrective regimen sliding scale   SubCutaneous at bedtime  lactobacillus acidophilus 1 Tablet(s) Oral daily  mupirocin 2% Ointment 1 Application(s) Topical two times a day  sodium chloride 1 Gram(s) Oral daily  sodium chloride 0.65% Nasal 1 Spray(s) Both Nostrils every 4 hours    MEDICATIONS  (PRN):  dextrose 40% Gel 15 Gram(s) Oral once PRN Blood Glucose LESS THAN 70 milliGRAM(s)/deciliter  glucagon  Injectable 1 milliGRAM(s) IntraMuscular once PRN Glucose LESS THAN 70 milligrams/deciliter        CAPILLARY BLOOD GLUCOSE      POCT Blood Glucose.: 290 mg/dL (23 Oct 2019 22:10)  POCT Blood Glucose.: 142 mg/dL (23 Oct 2019 17:27)  POCT Blood Glucose.: 199 mg/dL (23 Oct 2019 12:25)  POCT Blood Glucose.: 123 mg/dL (23 Oct 2019 08:17)    I&O's Summary      PHYSICAL EXAM  GENERAL: NAD, well-developed  HEAD:  Atraumatic, Normocephalic  EYES: EOMI, PERRLA, conjunctiva and sclera clear  NECK: Supple, No JVD  CHEST/LUNG: Clear to auscultation bilaterally; No wheeze  HEART: Regular rate and rhythm; No murmurs, rubs, or gallops  ABDOMEN: Soft, Nontender, Nondistended; Bowel sounds present  EXTREMITIES:  2+ Peripheral Pulses, No clubbing, cyanosis, or edema  PSYCH: AAOx3  SKIN: No rashes or lesions    LABS:                        11.6   12.58 )-----------( 219      ( 23 Oct 2019 06:18 )             34.0     10-23    127<L>  |  94<L>  |  17  ----------------------------<  162<H>  3.5   |  21<L>  |  1.02    Ca    8.3<L>      23 Oct 2019 06:18  Phos  2.9     10-23  Mg     2.0     10-23                RADIOLOGY & ADDITIONAL TESTS:    Imaging Personally Reviewed:  Consultant(s) Notes Reviewed:    Care Discussed with Consultants/Other Providers: Patient is a 69y old  Male who presents with a chief complaint of facial edema and pain (24 Oct 2019 04:51)    SUBJECTIVE / OVERNIGHT EVENTS:  Patient seen at bedside, No acute issues or events. Seen with son (Sahil) who wants to translate. Patient reporting left elbow pain that has been going on for the past 24 hrs. States that he has less pain in his sinuses and feels much improved with the periorbital edema     MEDICATIONS  (STANDING):  amLODIPine   Tablet 5 milliGRAM(s) Oral daily  aspirin enteric coated 81 milliGRAM(s) Oral daily  cephalexin 500 milliGRAM(s) Oral every 8 hours  dextrose 5%. 1000 milliLiter(s) (50 mL/Hr) IV Continuous <Continuous>  dextrose 50% Injectable 12.5 Gram(s) IV Push once  dextrose 50% Injectable 25 Gram(s) IV Push once  dextrose 50% Injectable 25 Gram(s) IV Push once  enoxaparin Injectable 40 milliGRAM(s) SubCutaneous daily  insulin glargine Injectable (LANTUS) 9 Unit(s) SubCutaneous at bedtime  insulin lispro (HumaLOG) corrective regimen sliding scale   SubCutaneous three times a day before meals  insulin lispro (HumaLOG) corrective regimen sliding scale   SubCutaneous at bedtime  lactobacillus acidophilus 1 Tablet(s) Oral daily  mupirocin 2% Ointment 1 Application(s) Topical two times a day  sodium chloride 1 Gram(s) Oral daily  sodium chloride 0.65% Nasal 1 Spray(s) Both Nostrils every 4 hours    MEDICATIONS  (PRN):  dextrose 40% Gel 15 Gram(s) Oral once PRN Blood Glucose LESS THAN 70 milliGRAM(s)/deciliter  glucagon  Injectable 1 milliGRAM(s) IntraMuscular once PRN Glucose LESS THAN 70 milligrams/deciliter        CAPILLARY BLOOD GLUCOSE      POCT Blood Glucose.: 290 mg/dL (23 Oct 2019 22:10)  POCT Blood Glucose.: 142 mg/dL (23 Oct 2019 17:27)  POCT Blood Glucose.: 199 mg/dL (23 Oct 2019 12:25)  POCT Blood Glucose.: 123 mg/dL (23 Oct 2019 08:17)    I&O's Summary      PHYSICAL EXAM  GENERAL: NAD, elderly male   HEAD:  Atraumatic, Normocephalic. Right Nare packed   EYES: EOMI, PERRLA, conjunctiva and sclera clear  NECK: Supple, No JVD  CHEST/LUNG: Clear to auscultation bilaterally; No wheezes, rales or rhonchi   HEART: Regular rate and rhythm; 3/6 systolic murmur in LUSB  ABDOMEN: Soft, Nontender, Nondistended; Bowel sounds present  EXTREMITIES:  2+ Peripheral Pulses, No clubbing, cyanosis, or edema  PSYCH: AAOx3  SKIN: Right nare packed   MSK: Patient with mild discomfort about left elbow flexion and extension, no ttp on palpation of the wrist, forearm.     LABS:                        11.6   12.58 )-----------( 219      ( 23 Oct 2019 06:18 )             34.0     10-23    127<L>  |  94<L>  |  17  ----------------------------<  162<H>  3.5   |  21<L>  |  1.02    Ca    8.3<L>      23 Oct 2019 06:18  Phos  2.9     10-23  Mg     2.0     10-23                RADIOLOGY & ADDITIONAL TESTS:    Imaging Personally Reviewed:  Consultant(s) Notes Reviewed:    Care Discussed with Consultants/Other Providers:

## 2019-10-24 NOTE — PROGRESS NOTE ADULT - ATTENDING COMMENTS
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 Pt admitted for Sepsis 2/2 nasal abscess and sinusitis.    Sepsis due to sinusitis and abscess: improving, Ancef switched to keflex for MSSA per ID. Outpt ENT f/u. Nasal packing x 1 more day per ENT    Mandible lytic bone lesion: Dental evaluated pt, pt refusing dental workup at this time ( known to [facility] service, refused workup in the past), plan for pt to follow up as an outpt.    Hyponatremia: likely due to SIADH in the setting of parotid tumor, salt tabs and fluid restriction, Trend Na    L parotid tumor: s/p ENT eval, ENT/Onc f/u outpt. 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 Pt admitted for Sepsis 2/2 nasal abscess and sinusitis.    Sepsis due to sinusitis and abscess: improving, Ancef switched to keflex for MSSA per ID. Outpt ENT f/u. Nasal packing x 1 more day per ENT. Discussed with CM to setup nursing services for home.     Mandible lytic bone lesion: Dental evaluated pt, pt refusing dental workup at this time ( known to OMFS service, refused workup in the past), plan for pt to follow up as an outpt.    Hyponatremia: likely due to SIADH in the setting of parotid tumor, salt tabs and fluid restriction, Trend Na    L parotid tumor: s/p ENT eval, ENT/Onc f/u outpt.    Plan for dc with PCP follow up  Pt aware of the discharge plan

## 2019-10-24 NOTE — PROGRESS NOTE ADULT - PROBLEM SELECTOR PROBLEM 8
Hypertension, unspecified type Type 2 diabetes mellitus without complication, with long-term current use of insulin

## 2019-10-24 NOTE — PROGRESS NOTE ADULT - PROBLEM SELECTOR PLAN 3
-likely 2/2 inc nasal congestion  -Subjectively reports improved breathing. On exam, normal respiratory effort, no use of accessory muscles, lungs clear on ausculation, no wheezes.   -Currently satting well on RA  -continue to monitor off O2, treatment of sinusitis as stated above  -afrin BID x3 days per ENT - patient with lytic defect seen on CT maxillofacial imaging, in setting of recent parotid tumor s/p surgical intervention and RT  - was seen by Dental and OMFS on prior admission - - with recommendation for tooth extraction however patient declined  - discussed with son this morning, given that patient declined dental intervention on this admission as well, will need to follow up with dental and OMFS on discharge and explained concern given recent malignancy hx

## 2019-10-24 NOTE — PROGRESS NOTE ADULT - PROBLEM SELECTOR PLAN 6
- generalized weakness that began with initiation of steroids 10 days ago   - hold steroids and statin to see if there is improvement in symptoms   - PT eval: likely home with no needs - s/p resection and radiation   - s/p ENT consultation, appreciate recs,

## 2019-10-24 NOTE — PROGRESS NOTE ADULT - PROBLEM SELECTOR PLAN 7
- A1C 7.1%.  - Blood glucose better controlled last 24 hours: 10/22: 261---> 205--->162---> 118  10/23: 123     Plan:   -  Lantus 9 units bedtime  - Lispro 1 unit TID pre-meal   - POC finger stick TID pre-meal/bedtime - generalized weakness that began with initiation of steroids 10 days ago   - hold steroids and statin to see if there is improvement in symptoms   - PT eval: likely home with no needs

## 2019-10-24 NOTE — PROGRESS NOTE ADULT - PROBLEM SELECTOR PLAN 1
On admission meets sepsis criteria with HR>90 and leukocytosis.   - CT chest ruled out SVC syndrome   - CT maxillofacial showed abscess involving nasal septum, measuring 3.7 cm x 1.8 cm x 3.1 cm, extends into the lateral nasal cartilage, s/p I & D by ENT.   - wound culture MSSA   - Blood culture x2 NGTD (10/20) > 48 hrs.     Plan:   -  Discontinued vanc+ zosyn. Started ancef 10/22.   -  ENT following, appreciate recs.  - CT maxillofacial notable for "A nonspecific rounded lytic defect involving the left mandibular alveolus in the molar region." s/p dental consult, plan for oral x-ray, possible tooth extraction 10/23. Medical optimization documented in chart note. - patient with nasal abscess seen on CT maxillofacial imaging s/p multiple drainages with wound culture growing MSSA  - started on keflex 500 q 8 x 5 days as per ID recommendations from ancef yesterday

## 2019-10-24 NOTE — PROGRESS NOTE ADULT - PROVIDER SPECIALTY LIST ADULT
ENT
Infectious Disease
Infectious Disease
Internal Medicine

## 2019-10-24 NOTE — PROGRESS NOTE ADULT - PROBLEM SELECTOR PLAN 8
BP controlled  - continue amlodipine - A1C 7.1%.  - Blood glucose better controlled last 24 hours: 10/22: 261---> 205--->162---> 118  10/23: 123     Plan:   -  Lantus 9 units bedtime  - Lispro 1 unit TID pre-meal   - POC finger stick TID pre-meal/bedtime

## 2019-10-24 NOTE — PROGRESS NOTE ADULT - PROBLEM SELECTOR PLAN 9
DVT ppx: Lovenox qd  Diet: Halal  Dispo: PT eval pending: rec likely home with no needs BP controlled  - continue amlodipine

## 2019-10-24 NOTE — PROGRESS NOTE ADULT - ASSESSMENT
68 y/o Spanish speaking M with hx of DM, HTN, HLD, L parotid tumor treated with surgery (April 2019) and radiation (last rad August 16), CAD s/p pacemaker and stent 8 years ago, and CKD2 presents with face swelling in the setting of recent steroid use, crusted lesions on nose, and weakness, CT maxillofacial showed abscess involving nasal septum, measuring 3.7 cm x 1.8 cm x 3.1 cm, extends into the lateral nasal cartilage, s/p I & D by ENT.

## 2019-10-24 NOTE — PROGRESS NOTE ADULT - REASON FOR ADMISSION
facial edema and pain

## 2019-10-24 NOTE — PROGRESS NOTE ADULT - SUBJECTIVE AND OBJECTIVE BOX
Patient seen and examined, no acute events overnight. Transitioned to PO abx per ID.     T(C): 36.4 (10-23-19 @ 20:52), Max: 36.9 (10-23-19 @ 05:55)  HR: 77 (10-23-19 @ 20:52) (77 - 83)  BP: 114/59 (10-23-19 @ 20:52) (114/59 - 128/54)  RR: 18 (10-23-19 @ 20:52) (15 - 18)  SpO2: 100% (10-23-19 @ 20:52) (100% - 100%)  NAD, awake and alert  Breathing comfortably on RA  Face: R sided swelling improved  Nose: moderate swelling and induration of the nasal tip with macerated skin (significant improvement), mucoid/purulent fluid debrided from anterior nasal cavity, right nasal septum with patent incision, packing replaced, min serosanginous exudate expressed from wound   OC/OP: poor dentition  Neck soft/flat    Cx: staph aureus    A/P  70 y/o Moh[place] speaking M with hx of DM, HTN, HLD, L parotid tumor s/p L parotidectomy, SND 2-4, cervicofacial flap and CRT 2017 presented 10/19 with nasal abscess now s/p I&D.  -abx per ID – switch to keflex 10/23  -daily packing x1 more day- VNS can perform - use 1/4 packing and pack right nasal septum with a cotton tip, secure with tape  -strict glucose control  -mupirocin to right nare BID  -warm compresses q4hr to nose  -nasal saline sprays q4h while awake  -okay to DC from ORL perspective with VNS  -call/page with questions Patient seen and examined, no acute events overnight. Transitioned to PO abx per ID. Still with severe R sided nasal dorsum pain. No fevers.     T(C): 36.4 (10-23-19 @ 20:52), Max: 36.9 (10-23-19 @ 05:55)  HR: 77 (10-23-19 @ 20:52) (77 - 83)  BP: 114/59 (10-23-19 @ 20:52) (114/59 - 128/54)  RR: 18 (10-23-19 @ 20:52) (15 - 18)  SpO2: 100% (10-23-19 @ 20:52) (100% - 100%)  NAD, awake and alert  Breathing comfortably on RA  Face: R sided swelling improved  Nose: moderate swelling and induration of the nasal tip with macerated skin (significant improvement), mucoid/purulent fluid debrided from anterior nasal cavity, right nasal septum with patent incision, packing removed with release of purulent drainage, no further purulence expressed from wound after removal of packing, packing replaced   OC/OP: poor dentition  Neck soft/flat    Cx: staph aureus  Glucose 290    A/P  70 y/o Portuguese speaking M with hx of DM, HTN, HLD, L parotid tumor s/p L parotidectomy, SND 2-4, cervicofacial flap and CRT 2017 presented 10/19 with nasal abscess now s/p I&D. Continued improvement but requires improved BSG control.  -abx per ID – switch to keflex 10/23  -daily packing x1 more day- VNS can perform - use 1/4 packing and pack right nasal septum with a cotton tip, secure with tape  -strict glucose control  -mupirocin to right nare BID  -warm compresses q4hr to nose  -nasal saline sprays q4h while awake  -okay to DC from ORL perspective with VNS  -call/page with questions

## 2019-10-24 NOTE — PROGRESS NOTE ADULT - PROBLEM SELECTOR PLAN 5
- s/p resection and radiation   - s/p ENT consultation, appreciate recs, -painful, crusted lesions on nose in setting of recent steroid use  -s/p Clindamycin initially and broadened to vanc, zosyn. c/w abx  -does not appear to be consistent with zoster as lesions on both sides of nose -painful, crusted lesions on nose in setting of recent steroid use  -s/p Clindamycin initially and broadened to vanc, zosyn. c/w abx will be on keflex for another 5 days

## 2019-10-24 NOTE — PROGRESS NOTE ADULT - PROBLEM SELECTOR PLAN 2
Likely SIADH combined with hypovolemia.   Na trend: 124--> 127--> 130 ---> 128 ---> 126-->127    Plan:   - continue NaCl 1g daily   - continue monitoring BMP BID - patient with baseline hyponatremia, moderate likely 2/2 SIADH and hypovolemia from poor PO intake  - currently on salt tab with sodium holding at baseline, will need repeat BMP on discharge  - c/w salt tabs 1 tab QD - patient with baseline hyponatremia, moderate likely 2/2 SIADH and hypovolemia from poor PO intake  - currently on salt tab with sodium holding at baseline, will need repeat BMP on discharge  - increase salt tab to BID

## 2019-10-24 NOTE — DISCHARGE NOTE NURSING/CASE MANAGEMENT/SOCIAL WORK - PATIENT PORTAL LINK FT
You can access the FollowMyHealth Patient Portal offered by [facility] by registering at the following website: [email or web address]. By joining [employer]’s FollowMyHealth portal, you will also be able to view your health information using other applications (apps) compatible with our system.

## 2019-10-25 LAB
BACTERIA BLD CULT: SIGNIFICANT CHANGE UP

## 2019-11-08 ENCOUNTER — APPOINTMENT (OUTPATIENT)
Dept: OTOLARYNGOLOGY | Facility: CLINIC | Age: 69
End: 2019-11-08
Payer: MEDICARE

## 2019-11-08 VITALS — HEART RATE: 70 BPM | DIASTOLIC BLOOD PRESSURE: 62 MMHG | SYSTOLIC BLOOD PRESSURE: 95 MMHG | HEIGHT: 60 IN

## 2019-11-08 PROCEDURE — 99214 OFFICE O/P EST MOD 30 MIN: CPT | Mod: 25

## 2019-11-08 PROCEDURE — 31231 NASAL ENDOSCOPY DX: CPT

## 2019-11-08 NOTE — PHYSICAL EXAM
[de-identified] : Wound is well healed Flap viable. No signs of infection. FLORENCIO [Midline] : trachea located in midline position [Normal] : no rashes

## 2019-11-08 NOTE — REASON FOR VISIT
[Subsequent Evaluation] : a subsequent evaluation for [FreeTextEntry2] : follow up.in hospital 2 weeks ago.having problem breathing.nose is blocked.

## 2019-11-08 NOTE — HISTORY OF PRESENT ILLNESS
[de-identified] : 69 year old male referred by ED for left facial mas.\par S/P L parotidectomy with sacrifice of the lower division of the facial nerve and skin, neck dissection and cervicofacial flap reconstruction on 4/17/19.\par Patient completed adjuvant RT on 8/19.\par Complete review of systems which was performed during a previous encounter was reviewed with the patient and there are no changes except as stated in the HPI section.\par

## 2019-11-13 PROBLEM — D49.0 NEOPLASM OF UNSPECIFIED BEHAVIOR OF DIGESTIVE SYSTEM: Chronic | Status: ACTIVE | Noted: 2019-10-19

## 2019-11-18 ENCOUNTER — APPOINTMENT (OUTPATIENT)
Dept: PHYSICAL MEDICINE AND REHAB | Facility: CLINIC | Age: 69
End: 2019-11-18

## 2019-12-17 ENCOUNTER — FORM ENCOUNTER (OUTPATIENT)
Age: 69
End: 2019-12-17

## 2019-12-18 ENCOUNTER — APPOINTMENT (OUTPATIENT)
Dept: CT IMAGING | Facility: IMAGING CENTER | Age: 69
End: 2019-12-18
Payer: MEDICARE

## 2019-12-18 ENCOUNTER — OUTPATIENT (OUTPATIENT)
Dept: OUTPATIENT SERVICES | Facility: HOSPITAL | Age: 69
LOS: 1 days | End: 2019-12-18
Payer: MEDICARE

## 2019-12-18 DIAGNOSIS — Z90.49 ACQUIRED ABSENCE OF OTHER SPECIFIED PARTS OF DIGESTIVE TRACT: Chronic | ICD-10-CM

## 2019-12-18 DIAGNOSIS — H26.9 UNSPECIFIED CATARACT: Chronic | ICD-10-CM

## 2019-12-18 DIAGNOSIS — Z95.0 PRESENCE OF CARDIAC PACEMAKER: Chronic | ICD-10-CM

## 2019-12-18 DIAGNOSIS — C08.9 MALIGNANT NEOPLASM OF MAJOR SALIVARY GLAND, UNSPECIFIED: ICD-10-CM

## 2019-12-18 DIAGNOSIS — D49.0 NEOPLASM OF UNSPECIFIED BEHAVIOR OF DIGESTIVE SYSTEM: Chronic | ICD-10-CM

## 2019-12-18 PROCEDURE — 70491 CT SOFT TISSUE NECK W/DYE: CPT

## 2019-12-18 PROCEDURE — 70491 CT SOFT TISSUE NECK W/DYE: CPT | Mod: 26

## 2019-12-18 PROCEDURE — 82565 ASSAY OF CREATININE: CPT

## 2019-12-25 ENCOUNTER — FORM ENCOUNTER (OUTPATIENT)
Age: 69
End: 2019-12-25

## 2019-12-26 ENCOUNTER — APPOINTMENT (OUTPATIENT)
Dept: RADIOLOGY | Facility: CLINIC | Age: 69
End: 2019-12-26
Payer: MEDICARE

## 2019-12-26 ENCOUNTER — OUTPATIENT (OUTPATIENT)
Dept: OUTPATIENT SERVICES | Facility: HOSPITAL | Age: 69
LOS: 1 days | End: 2019-12-26
Payer: MEDICARE

## 2019-12-26 ENCOUNTER — APPOINTMENT (OUTPATIENT)
Dept: PHYSICAL MEDICINE AND REHAB | Facility: CLINIC | Age: 69
End: 2019-12-26
Payer: MEDICARE

## 2019-12-26 VITALS
BODY MASS INDEX: 20.8 KG/M2 | SYSTOLIC BLOOD PRESSURE: 122 MMHG | HEART RATE: 76 BPM | HEIGHT: 62 IN | DIASTOLIC BLOOD PRESSURE: 66 MMHG | OXYGEN SATURATION: 98 % | WEIGHT: 113 LBS

## 2019-12-26 DIAGNOSIS — Z95.0 PRESENCE OF CARDIAC PACEMAKER: Chronic | ICD-10-CM

## 2019-12-26 DIAGNOSIS — M79.672 PAIN IN LEFT FOOT: ICD-10-CM

## 2019-12-26 DIAGNOSIS — D49.0 NEOPLASM OF UNSPECIFIED BEHAVIOR OF DIGESTIVE SYSTEM: Chronic | ICD-10-CM

## 2019-12-26 DIAGNOSIS — G89.29 PAIN IN LEFT FOOT: ICD-10-CM

## 2019-12-26 DIAGNOSIS — M79.671 PAIN IN RIGHT FOOT: ICD-10-CM

## 2019-12-26 DIAGNOSIS — H26.9 UNSPECIFIED CATARACT: Chronic | ICD-10-CM

## 2019-12-26 DIAGNOSIS — G89.29 PAIN IN RIGHT FOOT: ICD-10-CM

## 2019-12-26 DIAGNOSIS — Z90.49 ACQUIRED ABSENCE OF OTHER SPECIFIED PARTS OF DIGESTIVE TRACT: Chronic | ICD-10-CM

## 2019-12-26 PROCEDURE — 99203 OFFICE O/P NEW LOW 30 MIN: CPT

## 2019-12-26 PROCEDURE — 73630 X-RAY EXAM OF FOOT: CPT

## 2019-12-26 PROCEDURE — 73630 X-RAY EXAM OF FOOT: CPT | Mod: 26,50

## 2019-12-29 PROBLEM — M79.672 CHRONIC PAIN IN LEFT FOOT: Status: ACTIVE | Noted: 2019-12-26

## 2019-12-29 PROBLEM — M79.671 CHRONIC PAIN IN RIGHT FOOT: Status: ACTIVE | Noted: 2019-12-26

## 2019-12-29 RX ORDER — LISINOPRIL 5 MG/1
5 TABLET ORAL
Refills: 0 | Status: ACTIVE | COMMUNITY

## 2019-12-29 RX ORDER — INSULIN GLARGINE 100 [IU]/ML
INJECTION, SOLUTION SUBCUTANEOUS
Refills: 0 | Status: ACTIVE | COMMUNITY

## 2019-12-29 RX ORDER — FLUCONAZOLE 40 MG/ML
40 POWDER, FOR SUSPENSION ORAL
Qty: 1 | Refills: 2 | Status: DISCONTINUED | COMMUNITY
Start: 2019-08-07 | End: 2019-12-29

## 2019-12-29 RX ORDER — FLUCONAZOLE 40 MG/ML
40 POWDER, FOR SUSPENSION ORAL
Qty: 1 | Refills: 0 | Status: DISCONTINUED | COMMUNITY
Start: 2019-07-23 | End: 2019-12-29

## 2019-12-29 RX ORDER — DIPHENHYDRAMINE HYDROCHLORIDE AND LIDOCAINE HYDROCHLORIDE AND ALUMINUM HYDROXIDE AND MAGNESIUM HYDRO
KIT
Qty: 1 | Refills: 3 | Status: DISCONTINUED | COMMUNITY
Start: 2019-07-23 | End: 2019-12-29

## 2019-12-29 RX ORDER — DEXAMETHASONE 2 MG/1
2 TABLET ORAL TWICE DAILY
Qty: 20 | Refills: 0 | Status: DISCONTINUED | COMMUNITY
Start: 2019-10-01 | End: 2019-12-29

## 2019-12-29 NOTE — PHYSICAL EXAM
[FreeTextEntry1] : MSK: NROM bilateral hips/knees. PROM ankles normal, decreased AROM. \par + tenderness over bilateral Achilles. [Normal] : Oriented to person, place, and time, insight and judgement were intact and the affect was normal

## 2019-12-29 NOTE — REVIEW OF SYSTEMS
[Joint Pain] : joint pain [Joint Stiffness] : joint stiffness [Difficulty Walking] : difficulty walking [Negative] : Psychiatric

## 2019-12-29 NOTE — ASSESSMENT
[FreeTextEntry1] : lower extremity pain, unclear etiology. Pain is not neuropathic in nature, but still may be diabetic neuropathy. Given the symmetric nature and multiple joint involvement will refer for bilateral foot x-ray and refer to rheumatology tp rule out a systemic process.

## 2019-12-29 NOTE — HISTORY OF PRESENT ILLNESS
[FreeTextEntry1] : 69 year old male with lower extremity pain for 15 years. Pain is in bilateral feet, worse posteriorly and when walking. Pain in both knees, + stiffness. \par No fever/chills. Has not tried \par No numbness, tingling, weakness, bowel or bladder changes. No trauma. \par recent parotid tumor, s/p resection, radiation.

## 2020-01-16 ENCOUNTER — APPOINTMENT (OUTPATIENT)
Dept: OTOLARYNGOLOGY | Facility: CLINIC | Age: 70
End: 2020-01-16
Payer: MEDICARE

## 2020-01-16 VITALS — SYSTOLIC BLOOD PRESSURE: 107 MMHG | DIASTOLIC BLOOD PRESSURE: 66 MMHG | HEART RATE: 80 BPM

## 2020-01-16 PROCEDURE — 99214 OFFICE O/P EST MOD 30 MIN: CPT | Mod: 25

## 2020-01-16 PROCEDURE — 31575 DIAGNOSTIC LARYNGOSCOPY: CPT

## 2020-01-16 NOTE — REASON FOR VISIT
[Subsequent Evaluation] : a subsequent evaluation for [[employer] ] : provided by [employer]   [FreeTextEntry1] : [id number] [TWNoteComboBox1] : Sharona [FreeTextEntry2] : rojelio

## 2020-01-16 NOTE — PHYSICAL EXAM
[Midline] : trachea located in midline position [Normal] : no rashes [de-identified] : Wound is well healed Flap viable. No signs of infection. FLORENCIO

## 2020-01-16 NOTE — HISTORY OF PRESENT ILLNESS
[de-identified] : 69 year old male referred by ED for left facial mas.\par S/P L parotidectomy with sacrifice of the lower division of the facial nerve and skin, neck dissection and cervicofacial flap reconstruction on 4/17/19.\par Patient completed adjuvant RT on 8/19.\par last ct 12/2019 No evidence of residual or recurrent disease status post left parotidectomy. Decreased size of a right level 1B lymph node from the prior exam. Development of an expansile low-density focus within the left AE fold which may represent posttreatment submucosal edema or a mucous retention cyst. Correlate with direct visual inspection and continued follow-up is advised. Unchanged appearance of a round lytic defect within the left mandibular alveolus and soft tissue filling defect which may represent an underlying mass versus an odontogenic lesion.\par pt c.o left ear clog and hearing loss, left side face numbness and left side neck with hard since surgery\par pt has no dysphagia, dyspnea or wt loss.\par pt is seeing for Dr. Oliveira for pain.\par Complete review of systems which was performed during a previous encounter was reviewed with the patient and there are no changes except as stated in the HPI section.\par

## 2020-03-24 ENCOUNTER — APPOINTMENT (OUTPATIENT)
Dept: OTOLARYNGOLOGY | Facility: CLINIC | Age: 70
End: 2020-03-24

## 2020-08-25 ENCOUNTER — APPOINTMENT (OUTPATIENT)
Dept: OTOLARYNGOLOGY | Facility: CLINIC | Age: 70
End: 2020-08-25
Payer: MEDICARE

## 2020-08-25 VITALS
HEIGHT: 62 IN | OXYGEN SATURATION: 99 % | BODY MASS INDEX: 22.08 KG/M2 | HEART RATE: 76 BPM | DIASTOLIC BLOOD PRESSURE: 76 MMHG | TEMPERATURE: 209.66 F | WEIGHT: 120 LBS | SYSTOLIC BLOOD PRESSURE: 133 MMHG

## 2020-08-25 PROCEDURE — 99214 OFFICE O/P EST MOD 30 MIN: CPT

## 2020-08-25 NOTE — REASON FOR VISIT
[Subsequent Evaluation] : a subsequent evaluation for [[employer] ] : provided by [employer]   [FreeTextEntry1] : [id number] [FreeTextEntry2] : rojelio [TWNoteComboBox1] : Sharona

## 2020-08-25 NOTE — PHYSICAL EXAM
[de-identified] : Wound is well healed Flap viable. No signs of infection. FLORENCIO [Midline] : trachea located in midline position [Normal] : no rashes

## 2020-08-25 NOTE — HISTORY OF PRESENT ILLNESS
[de-identified] : 69 year old male referred by ED for left facial mass, S/P L parotidectomy with sacrifice of the lower division of the facial nerve and skin, neck dissection and cervicofacial flap reconstruction on 4/17/19.\par Patient completed adjuvant RT on 8/19.\par last ct 12/2019 No evidence of residual or recurrent disease status post left parotidectomy. Decreased size of a right level 1B lymph node from the prior exam. Development of an expansile low-density focus within the left AE fold which may represent posttreatment submucosal edema or a mucous retention cyst. Correlate with direct visual inspection and continued follow-up is advised. Unchanged appearance of a round lytic defect within the left mandibular alveolus and soft tissue filling defect which may represent an underlying mass versus an odontogenic lesion.\par pt is here to f/u and doing well, no more pain and ear pain. \par pt has no dysphagia, dyspnea or wt loss.\par pt is seeing for Dr. Oliveira for pain\par Complete review of systems which was performed during a previous encounter was reviewed with the patient and there are no changes except as stated in the HPI section.\par

## 2020-08-28 NOTE — ED ADULT NURSE NOTE - PAIN RATING/NUMBER SCALE (0-10): ACTIVITY
6 I personally performed the service described in the documentation recorded by the scribe in my presence, and it accurately and completely records my words and actions.

## 2020-08-29 ENCOUNTER — OUTPATIENT (OUTPATIENT)
Dept: OUTPATIENT SERVICES | Facility: HOSPITAL | Age: 70
LOS: 1 days | End: 2020-08-29
Payer: MEDICARE

## 2020-08-29 ENCOUNTER — APPOINTMENT (OUTPATIENT)
Dept: CT IMAGING | Facility: IMAGING CENTER | Age: 70
End: 2020-08-29
Payer: MEDICARE

## 2020-08-29 DIAGNOSIS — D49.0 NEOPLASM OF UNSPECIFIED BEHAVIOR OF DIGESTIVE SYSTEM: Chronic | ICD-10-CM

## 2020-08-29 DIAGNOSIS — C08.9 MALIGNANT NEOPLASM OF MAJOR SALIVARY GLAND, UNSPECIFIED: ICD-10-CM

## 2020-08-29 DIAGNOSIS — H26.9 UNSPECIFIED CATARACT: Chronic | ICD-10-CM

## 2020-08-29 DIAGNOSIS — Z95.0 PRESENCE OF CARDIAC PACEMAKER: Chronic | ICD-10-CM

## 2020-08-29 DIAGNOSIS — Z90.49 ACQUIRED ABSENCE OF OTHER SPECIFIED PARTS OF DIGESTIVE TRACT: Chronic | ICD-10-CM

## 2020-08-29 PROCEDURE — 70491 CT SOFT TISSUE NECK W/DYE: CPT | Mod: 26

## 2020-08-29 PROCEDURE — 70491 CT SOFT TISSUE NECK W/DYE: CPT

## 2020-08-29 PROCEDURE — 71260 CT THORAX DX C+: CPT

## 2020-08-29 PROCEDURE — 71260 CT THORAX DX C+: CPT | Mod: 26

## 2020-08-29 PROCEDURE — 82565 ASSAY OF CREATININE: CPT

## 2020-09-22 ENCOUNTER — APPOINTMENT (OUTPATIENT)
Dept: OTOLARYNGOLOGY | Facility: CLINIC | Age: 70
End: 2020-09-22
Payer: MEDICARE

## 2020-09-22 VITALS
WEIGHT: 120 LBS | SYSTOLIC BLOOD PRESSURE: 132 MMHG | TEMPERATURE: 207.86 F | DIASTOLIC BLOOD PRESSURE: 79 MMHG | HEART RATE: 74 BPM | OXYGEN SATURATION: 100 % | HEIGHT: 62 IN | BODY MASS INDEX: 22.08 KG/M2

## 2020-09-22 PROCEDURE — 99214 OFFICE O/P EST MOD 30 MIN: CPT

## 2020-09-22 NOTE — REASON FOR VISIT
[Subsequent Evaluation] : a subsequent evaluation for [[employer] ] : provided by [employer]   [FreeTextEntry1] : [id number] [FreeTextEntry2] : rojelio [TWNoteComboBox1] : Sharona

## 2020-09-22 NOTE — PHYSICAL EXAM
[de-identified] : Wound is healed Flap viable. No signs of infection. FLORENCIO [Midline] : trachea located in midline position [Normal] : no rashes

## 2020-09-22 NOTE — HISTORY OF PRESENT ILLNESS
[de-identified] : 69 year old male referred by ED for left facial mass, S/P L parotidectomy with sacrifice of the lower division of the facial nerve and skin, neck dissection and cervicofacial flap reconstruction on 4/17/19.\par Patient completed adjuvant RT on 8/19.\par \par last CT neck 8/29/20: 1. Stable interval follow-up CT examination without evidence of residual or recurrent or neoplastic disease in or about the left parotidectomy bed.\par \par CT chest 8/29/20: Stable examination. No findings of metastatic disease\par \par \par pt is here to f/u and C/O tooth pain on the lower left and right side jaw for last 6 months, pt will make appmnt with dentist.  Also some tenderness of left side of neck when touching the area since after the surgery. No headaches or ear pain. \par pt reports slight dysphagia when eating hard foods, getting stuck in throat since before the surgery, slight dyspnea which, as per son, is not new b/c one nostril is always clogged. No fever, chills, or wt loss.\par pt is seeing for Dr. Oliveira for pain\par Complete review of systems which was performed during a previous encounter was reviewed with the patient and there are no changes except as stated in the HPI section.\par

## 2020-11-03 ENCOUNTER — APPOINTMENT (OUTPATIENT)
Dept: OTOLARYNGOLOGY | Facility: CLINIC | Age: 70
End: 2020-11-03
Payer: MEDICARE

## 2020-11-03 VITALS
OXYGEN SATURATION: 100 % | HEART RATE: 66 BPM | HEIGHT: 62 IN | BODY MASS INDEX: 21.91 KG/M2 | WEIGHT: 119.05 LBS | SYSTOLIC BLOOD PRESSURE: 133 MMHG | DIASTOLIC BLOOD PRESSURE: 85 MMHG

## 2020-11-03 PROCEDURE — 99213 OFFICE O/P EST LOW 20 MIN: CPT

## 2020-11-03 RX ORDER — REPAGLINIDE 1 MG/1
1 TABLET ORAL
Refills: 0 | Status: COMPLETED | COMMUNITY
End: 2020-11-03

## 2020-11-03 NOTE — REASON FOR VISIT
[Subsequent Evaluation] : a subsequent evaluation for [Other: _____] : [unfilled] [FreeTextEntry2] : f/u  S/P L parotidectomy with sacrifice of the lower division of the facial nerve and skin, neck dissection and cervicofacial flap reconstruction on 4/17/19.

## 2020-11-03 NOTE — PHYSICAL EXAM
[de-identified] : Incision healed. FLORENCIO [Midline] : trachea located in midline position [Normal] : no rashes

## 2020-11-03 NOTE — HISTORY OF PRESENT ILLNESS
[de-identified] : 70 year old male referred by ED for left facial mass, S/P L parotidectomy with sacrifice of the lower division of the facial nerve and skin, neck dissection and cervicofacial flap reconstruction on 4/17/19. \par Patient completed adjuvant RT on 8/19.\par Today pt is here for 6 week f/u. Pt still c/o slight pain on posterior lower right jaw for the past 6 months. Pt reports slight dysphagia when eating hard foods. Still having tenderness to touch of left side of neck. Pt has appmnt with oral surgeon on 11/16.  No headaches, neck pain, or ear pain. No fever, chills, or wt loss. Denies dyspnea, dysphonia. \par \par last CT neck 8/29/20: 1. Stable interval follow-up CT examination without evidence of residual or recurrent or neoplastic disease in or about the left parotidectomy bed.\par CT chest 8/29/20: Stable examination. No findings of metastatic disease\par \par Complete review of systems which was performed during a previous encounter was reviewed with the patient and there are no changes except as stated in the HPI section.\par

## 2021-01-12 ENCOUNTER — APPOINTMENT (OUTPATIENT)
Dept: OTOLARYNGOLOGY | Facility: CLINIC | Age: 71
End: 2021-01-12
Payer: MEDICARE

## 2021-01-12 PROCEDURE — 99214 OFFICE O/P EST MOD 30 MIN: CPT

## 2021-01-12 NOTE — HISTORY OF PRESENT ILLNESS
[de-identified] : 70 year old male referred by ED for left facial mass, S/P L parotidectomy with sacrifice of the lower division of the facial nerve and skin, neck dissection and cervicofacial flap reconstruction on 4/17/19. \par Patient completed adjuvant RT on 8/19.\par Today pt is here for 8 week f/u. \par No pain.  No headaches, neck pain, or ear pain. No fever, chills, or wt loss. Denies dyspnea, dysphonia. Patient saw Oral surgery and had the extraction done.\par \par last CT neck 8/29/20: 1. Stable interval follow-up CT examination without evidence of residual or recurrent or neoplastic disease in or about the left parotidectomy bed.\par CT chest 8/29/20: Stable examination. No findings of metastatic disease\par \par Complete review of systems which was performed during a previous encounter was reviewed with the patient and there are no changes except as stated in the HPI section.\par

## 2021-01-12 NOTE — PHYSICAL EXAM
[de-identified] : Incision healed. FLORENCIO [Midline] : trachea located in midline position [Normal] : no rashes

## 2021-01-19 NOTE — PROGRESS NOTE ADULT - PROBLEM/PLAN-7
DISPLAY PLAN FREE TEXT
No risk alerts present

## 2021-02-03 ENCOUNTER — APPOINTMENT (OUTPATIENT)
Dept: CT IMAGING | Facility: IMAGING CENTER | Age: 71
End: 2021-02-03

## 2021-03-17 ENCOUNTER — APPOINTMENT (OUTPATIENT)
Dept: CT IMAGING | Facility: IMAGING CENTER | Age: 71
End: 2021-03-17
Payer: MEDICARE

## 2021-03-17 ENCOUNTER — OUTPATIENT (OUTPATIENT)
Dept: OUTPATIENT SERVICES | Facility: HOSPITAL | Age: 71
LOS: 1 days | End: 2021-03-17
Payer: MEDICARE

## 2021-03-17 DIAGNOSIS — Z90.49 ACQUIRED ABSENCE OF OTHER SPECIFIED PARTS OF DIGESTIVE TRACT: Chronic | ICD-10-CM

## 2021-03-17 DIAGNOSIS — C08.9 MALIGNANT NEOPLASM OF MAJOR SALIVARY GLAND, UNSPECIFIED: ICD-10-CM

## 2021-03-17 DIAGNOSIS — Z95.0 PRESENCE OF CARDIAC PACEMAKER: Chronic | ICD-10-CM

## 2021-03-17 DIAGNOSIS — H26.9 UNSPECIFIED CATARACT: Chronic | ICD-10-CM

## 2021-03-17 DIAGNOSIS — D49.0 NEOPLASM OF UNSPECIFIED BEHAVIOR OF DIGESTIVE SYSTEM: Chronic | ICD-10-CM

## 2021-03-17 PROCEDURE — G1004: CPT

## 2021-03-17 PROCEDURE — 71260 CT THORAX DX C+: CPT

## 2021-03-17 PROCEDURE — 70491 CT SOFT TISSUE NECK W/DYE: CPT | Mod: 26,MG

## 2021-03-17 PROCEDURE — 70491 CT SOFT TISSUE NECK W/DYE: CPT

## 2021-03-17 PROCEDURE — 71260 CT THORAX DX C+: CPT | Mod: 26,MG

## 2021-08-03 ENCOUNTER — APPOINTMENT (OUTPATIENT)
Dept: OTOLARYNGOLOGY | Facility: CLINIC | Age: 71
End: 2021-08-03
Payer: MEDICARE

## 2021-08-03 VITALS
DIASTOLIC BLOOD PRESSURE: 78 MMHG | SYSTOLIC BLOOD PRESSURE: 128 MMHG | OXYGEN SATURATION: 100 % | BODY MASS INDEX: 21.91 KG/M2 | HEIGHT: 62 IN | WEIGHT: 119.05 LBS | HEART RATE: 68 BPM

## 2021-08-03 VITALS — TEMPERATURE: 209.12 F

## 2021-08-03 PROCEDURE — 99214 OFFICE O/P EST MOD 30 MIN: CPT

## 2021-08-03 NOTE — HISTORY OF PRESENT ILLNESS
[de-identified] : 70 year old male referred by ED for left facial mass, S/P L parotidectomy with sacrifice of the lower division of the facial nerve and skin, neck dissection and cervicofacial flap reconstruction on 4/17/19. \par Patient completed adjuvant RT on 8/2019. Here for follow-up. \par Today pt states area behind left ear is tender to touch with some pressure. \par No headaches, neck pain, otalgia, hearing loss.  No fever, chills, or wt loss. Denies dyspnea, dysphonia.\par \par Complete review of systems which was performed during a previous encounter was reviewed with the patient and there are no changes except as stated in the HPI section.\par \par \par \par CT neck 3/17/21: IMPRESSION:\par No significant change from the prior exam.\par No evidence of recurrent tumor within the left parotidectomy bed.\par Lytic lucency within the left mandible of uncertain etiology.\par No new adenopathy.\par \par CT chest 3/17/21: IMPRESSION:\par 4 mm left lower lobe nodule adjacent to the major fissure is stable.\par New left lower lobe 3 mm nodule. Followup in 3 months suggested.\par No Consolidations, edema, effusion or pneumothorax.\par

## 2021-08-03 NOTE — REASON FOR VISIT
[Subsequent Evaluation] : a subsequent evaluation for [Other: _____] : [unfilled] [Spouse] : spouse [Other: ______] : provided by JUAN M [FreeTextEntry1] : sherly [FreeTextEntry2] : [id number] [FreeTextEntry3] : linsey

## 2021-08-03 NOTE — PHYSICAL EXAM
[de-identified] : Incision healed. FLORENCIO [Midline] : trachea located in midline position [Normal] : no rashes

## 2021-10-16 ENCOUNTER — APPOINTMENT (OUTPATIENT)
Dept: CT IMAGING | Facility: IMAGING CENTER | Age: 71
End: 2021-10-16

## 2021-11-03 ENCOUNTER — APPOINTMENT (OUTPATIENT)
Dept: CT IMAGING | Facility: IMAGING CENTER | Age: 71
End: 2021-11-03
Payer: COMMERCIAL

## 2021-11-03 ENCOUNTER — OUTPATIENT (OUTPATIENT)
Dept: OUTPATIENT SERVICES | Facility: HOSPITAL | Age: 71
LOS: 1 days | End: 2021-11-03
Payer: MEDICARE

## 2021-11-03 DIAGNOSIS — C08.9 MALIGNANT NEOPLASM OF MAJOR SALIVARY GLAND, UNSPECIFIED: ICD-10-CM

## 2021-11-03 DIAGNOSIS — Z95.0 PRESENCE OF CARDIAC PACEMAKER: Chronic | ICD-10-CM

## 2021-11-03 DIAGNOSIS — Z90.49 ACQUIRED ABSENCE OF OTHER SPECIFIED PARTS OF DIGESTIVE TRACT: Chronic | ICD-10-CM

## 2021-11-03 DIAGNOSIS — D49.0 NEOPLASM OF UNSPECIFIED BEHAVIOR OF DIGESTIVE SYSTEM: Chronic | ICD-10-CM

## 2021-11-03 DIAGNOSIS — H26.9 UNSPECIFIED CATARACT: Chronic | ICD-10-CM

## 2021-11-03 PROCEDURE — 82565 ASSAY OF CREATININE: CPT

## 2021-11-03 PROCEDURE — 70491 CT SOFT TISSUE NECK W/DYE: CPT | Mod: 26

## 2021-11-03 PROCEDURE — 71260 CT THORAX DX C+: CPT | Mod: 26

## 2021-11-03 PROCEDURE — 70491 CT SOFT TISSUE NECK W/DYE: CPT

## 2021-11-03 PROCEDURE — 71260 CT THORAX DX C+: CPT

## 2021-11-08 NOTE — PATIENT PROFILE ADULT - LIVES WITH
Patient aware of results, agreeable to cpap, process explained. No further questions.   adult child(moni)/spouse

## 2021-11-16 ENCOUNTER — NON-APPOINTMENT (OUTPATIENT)
Age: 71
End: 2021-11-16

## 2021-11-16 ENCOUNTER — APPOINTMENT (OUTPATIENT)
Dept: OTOLARYNGOLOGY | Facility: CLINIC | Age: 71
End: 2021-11-16
Payer: COMMERCIAL

## 2021-11-16 VITALS — TEMPERATURE: 205.88 F

## 2021-11-16 VITALS
BODY MASS INDEX: 21.9 KG/M2 | OXYGEN SATURATION: 100 % | HEART RATE: 78 BPM | SYSTOLIC BLOOD PRESSURE: 137 MMHG | WEIGHT: 119 LBS | HEIGHT: 62 IN | DIASTOLIC BLOOD PRESSURE: 72 MMHG

## 2021-11-16 PROCEDURE — 99214 OFFICE O/P EST MOD 30 MIN: CPT

## 2021-11-16 NOTE — HISTORY OF PRESENT ILLNESS
[de-identified] : 71 year old male referred by ED for left facial mass, S/P L parotidectomy with sacrifice of the lower division of the facial nerve and skin, neck dissection and cervicofacial flap reconstruction on 4/17/19. 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 Here for follow-up. \par Today pt states left neck is tender to touch with some pressure. Hearing less out of left ear. Tongue is painful with spicy food. Needs water to swallow food and must eat slowly. \par No dysphonia, bleeding, otalgia or otorrhea. No fever, chills, or wt loss.\par Last CTs done 11/3/2021. \par \par CT chest 11/3/21:\par IMPRESSION:\par New and enlarging left pulmonary metastases.\par Enlarging right axillary chest wall metastasis.\par \par \par CT neck 11/3/21:\par IMPRESSION: Stable follow-up CT examination without evidence of residual or recurrent tumor in the left parotidectomy bed.\par \par Unchanged lytic lucency within the left side of the mandible of uncertain etiology. It demonstrated FDG uptake in the past. Additional carious left-sided maxillary second molar tooth. Correlate with dental examination.\par \par No new or enlarging cervical lymph nodes.\par \par Worsening left-sided mastoid air cell effusion.\par \par Please refer to the report for the concurrent dedicated chest CT for findings regarding the thoracic structures.\par

## 2021-11-16 NOTE — REASON FOR VISIT
[Spouse] : spouse [Other: ______] : provided by JUAN M [FreeTextEntry2] : salivary duct carcinoma [Interpreters_IDNumber] : [id number] [Interpreters_FullName] : keara [FreeTextEntry3] : BERTA

## 2021-11-16 NOTE — PHYSICAL EXAM
[de-identified] : Incision healed. FLORENCIO [Midline] : trachea located in midline position [Normal] : no rashes

## 2021-12-15 ENCOUNTER — APPOINTMENT (OUTPATIENT)
Dept: NUCLEAR MEDICINE | Facility: IMAGING CENTER | Age: 71
End: 2021-12-15
Payer: COMMERCIAL

## 2021-12-15 ENCOUNTER — OUTPATIENT (OUTPATIENT)
Dept: OUTPATIENT SERVICES | Facility: HOSPITAL | Age: 71
LOS: 1 days | End: 2021-12-15
Payer: MEDICARE

## 2021-12-15 DIAGNOSIS — Z90.49 ACQUIRED ABSENCE OF OTHER SPECIFIED PARTS OF DIGESTIVE TRACT: Chronic | ICD-10-CM

## 2021-12-15 DIAGNOSIS — C08.9 MALIGNANT NEOPLASM OF MAJOR SALIVARY GLAND, UNSPECIFIED: ICD-10-CM

## 2021-12-15 DIAGNOSIS — D49.0 NEOPLASM OF UNSPECIFIED BEHAVIOR OF DIGESTIVE SYSTEM: Chronic | ICD-10-CM

## 2021-12-15 DIAGNOSIS — H26.9 UNSPECIFIED CATARACT: Chronic | ICD-10-CM

## 2021-12-15 DIAGNOSIS — Z95.0 PRESENCE OF CARDIAC PACEMAKER: Chronic | ICD-10-CM

## 2021-12-15 PROCEDURE — 78815 PET IMAGE W/CT SKULL-THIGH: CPT | Mod: 26,PS,MH

## 2021-12-15 PROCEDURE — 78815 PET IMAGE W/CT SKULL-THIGH: CPT

## 2021-12-15 PROCEDURE — A9552: CPT

## 2021-12-28 ENCOUNTER — NON-APPOINTMENT (OUTPATIENT)
Age: 71
End: 2021-12-28

## 2022-01-13 ENCOUNTER — APPOINTMENT (OUTPATIENT)
Dept: THORACIC SURGERY | Facility: CLINIC | Age: 72
End: 2022-01-13
Payer: COMMERCIAL

## 2022-01-13 ENCOUNTER — NON-APPOINTMENT (OUTPATIENT)
Age: 72
End: 2022-01-13

## 2022-01-13 VITALS
HEART RATE: 78 BPM | SYSTOLIC BLOOD PRESSURE: 127 MMHG | BODY MASS INDEX: 22.08 KG/M2 | OXYGEN SATURATION: 99 % | DIASTOLIC BLOOD PRESSURE: 72 MMHG | WEIGHT: 120 LBS | RESPIRATION RATE: 17 BRPM | HEIGHT: 62 IN

## 2022-01-13 PROCEDURE — 99245 OFF/OP CONSLTJ NEW/EST HI 55: CPT

## 2022-01-13 PROCEDURE — 99072 ADDL SUPL MATRL&STAF TM PHE: CPT

## 2022-01-13 RX ORDER — DIPHENHYDRAMINE HYDROCHLORIDE AND LIDOCAINE HYDROCHLORIDE AND ALUMINUM HYDROXIDE AND MAGNESIUM HYDRO
KIT
Qty: 1 | Refills: 3 | Status: DISCONTINUED | COMMUNITY
Start: 2019-07-23 | End: 2022-01-13

## 2022-01-13 RX ORDER — OLMESARTAN MEDOXOMIL 20 MG/1
20 TABLET, FILM COATED ORAL
Refills: 0 | Status: DISCONTINUED | COMMUNITY
End: 2022-01-13

## 2022-01-13 RX ORDER — 70%ISOPROPYL ALCOHOL 0.7 ML/ML
70 SWAB TOPICAL
Refills: 0 | Status: DISCONTINUED | COMMUNITY
End: 2022-01-13

## 2022-01-14 RX ORDER — ASCORBIC ACID 500 MG
500 TABLET ORAL
Qty: 90 | Refills: 0 | Status: ACTIVE | COMMUNITY
Start: 2021-11-30

## 2022-01-14 RX ORDER — LANCETS 30 GAUGE
EACH MISCELLANEOUS
Qty: 100 | Refills: 0 | Status: ACTIVE | COMMUNITY
Start: 2021-12-31

## 2022-01-14 RX ORDER — LEVOTHYROXINE SODIUM 0.03 MG/1
25 TABLET ORAL
Refills: 0 | Status: ACTIVE | COMMUNITY

## 2022-01-14 RX ORDER — ALBUTEROL SULFATE 90 UG/1
108 (90 BASE) INHALANT RESPIRATORY (INHALATION)
Qty: 8 | Refills: 0 | Status: ACTIVE | COMMUNITY
Start: 2021-12-31

## 2022-01-14 RX ORDER — BISACODYL 5 MG/1
5 TABLET ORAL
Qty: 100 | Refills: 0 | Status: ACTIVE | COMMUNITY
Start: 2022-01-06

## 2022-01-14 RX ORDER — VITAMIN B COMPLEX
CAPSULE ORAL
Qty: 90 | Refills: 0 | Status: ACTIVE | COMMUNITY
Start: 2022-01-06

## 2022-01-14 RX ORDER — OMEPRAZOLE, SODIUM BICARBONATE 20; 1680 MG/1; MG/1
20-1680 POWDER, FOR SUSPENSION ORAL
Qty: 30 | Refills: 0 | Status: ACTIVE | COMMUNITY
Start: 2021-12-12

## 2022-01-14 RX ORDER — ERGOCALCIFEROL 1.25 MG/1
1.25 MG CAPSULE, LIQUID FILLED ORAL
Qty: 12 | Refills: 0 | Status: ACTIVE | COMMUNITY
Start: 2022-01-08

## 2022-01-14 RX ORDER — DOCUSATE SODIUM 100 MG/1
100 CAPSULE, LIQUID FILLED ORAL
Qty: 90 | Refills: 0 | Status: ACTIVE | COMMUNITY
Start: 2022-01-08

## 2022-01-14 RX ORDER — ACETAMINOPHEN EXTRA STRENGTH 500 MG/1
500 TABLET ORAL
Qty: 100 | Refills: 0 | Status: ACTIVE | COMMUNITY
Start: 2021-11-30

## 2022-01-14 RX ORDER — POLYETHYLENE GLYCOL-3350 AND ELECTROLYTES WITH FLAVOR PACK 240; 5.84; 2.98; 6.72; 22.72 G/278.26G; G/278.26G; G/278.26G; G/278.26G; G/278.26G
240 POWDER, FOR SOLUTION ORAL
Qty: 4000 | Refills: 0 | Status: ACTIVE | COMMUNITY
Start: 2021-12-25

## 2022-01-14 RX ORDER — BLOOD SUGAR DIAGNOSTIC
STRIP MISCELLANEOUS
Qty: 100 | Refills: 0 | Status: ACTIVE | COMMUNITY
Start: 2021-12-31

## 2022-01-14 RX ORDER — DICLOFENAC POTASSIUM 25 MG/1
25 TABLET, COATED ORAL
Qty: 60 | Refills: 0 | Status: ACTIVE | COMMUNITY
Start: 2021-12-09

## 2022-01-14 NOTE — HISTORY OF PRESENT ILLNESS
[FreeTextEntry1] : Mr. 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 Also s/p Lt anterior chest wall PPM placement for unknown reason. He was referred by Dr. Cordell Holguin for new and increasing size lung mets on surveillance CT scan.\par \par CT neck w/ IV contrast on 11/3/21:\par - Stable follow-up CT examination without evidence of residual or recurrent tumor in the left parotidectomy bed.\par - Unchanged lytic lucency within the left side of the mandible of uncertain etiology.\par \par CT chest w/ IV contrast on 11/3/21:\par - New and enlarging left pulmonary metastases.\par - Enlarging right axillary chest wall metastasis.\par \par PET/CT on 12/16/21:\par - Focus of mild FDG activity in left perihilar region is decreased in metabolism (SUV 2.9; image 86; previous SUV 6.0). Resolution of additional previously seen FDG-avid foci in bilateral hilar regions.\par - Nonspecific small, mildly FDG-avid focus of skin thickening in right axillary region, new as compared to prior PET/CT, and corresponding to location of 2 cm peripherally enhancing hypodense cutaneous lesion seen on CT dated 11/3/2021 (SUV 1.7; image 70).\par - New small focus of increased FDG activity in peripheral left lower lobe corresponds to a subcentimeter nodule which is new as compared to prior PET/CT, and which measures 0.7 cm on CT dated 11/3/2021. Due to CT technique, and respiratory motion, comparison with recent diagnostic CT is limited (SUV 1.8; image 102). Tiny, difficult to delineate FDG-avid focus in left upper lobe is unchanged (SUV 2.2; image 73; previous SUV 2.7). New less than 4 mm left lung nodule seen on recent diagnostic CT are not well evaluated due CT technique and are too small to characterize with PET.\par \par Patient is here today for CT Sx consultation, referred by Dr. Holguin.\par

## 2022-01-14 NOTE — CONSULT LETTER
[Consult Letter:] : I had the pleasure of evaluating your patient, [unfilled]. [( Thank you for referring [unfilled] for consultation for _____ )] : Thank you for referring [unfilled] for consultation for [unfilled] [Please see my note below.] : Please see my note below. [Consult Closing:] : Thank you very much for allowing me to participate in the care of this patient.  If you have any questions, please do not hesitate to contact me. [Sincerely,] : Sincerely, [FreeTextEntry2] : Dr. Cordell Holguin (MICHELL)\par Dr. Rudy Jacobs (Rad/Onc)\par  [FreeTextEntry3] : Shar Kim MD, MPH \par System Director of Thoracic Surgery \par Director of Comprehensive Lung and Foregut [place] \par Professor Cardiovascular & Thoracic Surgery  \par [facility] [employer] [facility]\par \par [facility]\par [address]\par Oncology [address]\[place] [address]\par Tel: [phone]\par Fax: [phone]\par

## 2022-01-14 NOTE — ASSESSMENT
[FreeTextEntry1] : Mr. 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 Also s/p Lt anterior chest wall PPM placement for unknown reason. He was referred by Dr. Cordell Holguin for new and increasing size lung mets on surveillance CT scan.\par \par CT neck w/ IV contrast on 11/3/21:\par - Stable follow-up CT examination without evidence of residual or recurrent tumor in the left parotidectomy bed.\par - Unchanged lytic lucency within the left side of the mandible of uncertain etiology.\par \par CT chest w/ IV contrast on 11/3/21:\par - New and enlarging left pulmonary metastases.\par - Enlarging right axillary chest wall metastasis.\par \par PET/CT on 12/16/21:\par - Focus of mild FDG activity in left perihilar region is decreased in metabolism (SUV 2.9; image 86; previous SUV 6.0). Resolution of additional previously seen FDG-avid foci in bilateral hilar regions.\par - Nonspecific small, mildly FDG-avid focus of skin thickening in right axillary region, new as compared to prior PET/CT, and corresponding to location of 2 cm peripherally enhancing hypodense cutaneous lesion seen on CT dated 11/3/2021 (SUV 1.7; image 70).\par - New small focus of increased FDG activity in peripheral left lower lobe corresponds to a subcentimeter nodule which is new as compared to prior PET/CT, and which measures 0.7 cm on CT dated 11/3/2021. Due to CT technique, and respiratory motion, comparison with recent diagnostic CT is limited (SUV 1.8; image 102). Tiny, difficult to delineate FDG-avid focus in left upper lobe is unchanged (SUV 2.2; image 73; previous SUV 2.7). New less than 4 mm left lung nodule seen on recent diagnostic CT are not well evaluated due CT technique and are too small to characterize with PET.\par \par I have reviewed the patient's medical records and diagnostic images at time of this office consultation and have made the following recommendation:\par 1. CT and PET scans reviewed, I recommended a biopsy to tissue diagnosis, however, patient is traveling back to his country soon and will return to [place] end of March earliest. He would like to wait until he returns.\par 2. RTC in April with CT Chest w/o contrast and will discuss findings\par \par \par I personally performed the services described in the documentation, reviewed the documentation recorded by the scribe in my presence and it accurately and completely records my words and actions.\par \par I, Rosalie Nicole NP, am scribing for and the presence of TAY Perez, the following sections HISTORY OF PRESENT ILLNESS, PAST MEDICAL/FAMILY/SOCIAL HISTORY; REVIEW OF SYSTEMS; VITAL SIGNS; PHYSICAL EXAM; DISPOSITION.\par \par

## 2022-01-14 NOTE — DATA REVIEWED
[FreeTextEntry1] : I have independently reviewed the following:\par - CT neck w/ IV contrast on 11/3/21\par - CT chest w/ IV contrast on 11/3/21\par - PET/CT on 12/16/21

## 2022-04-02 ENCOUNTER — OUTPATIENT (OUTPATIENT)
Dept: OUTPATIENT SERVICES | Facility: HOSPITAL | Age: 72
LOS: 1 days | End: 2022-04-02
Payer: MEDICARE

## 2022-04-02 ENCOUNTER — RESULT REVIEW (OUTPATIENT)
Age: 72
End: 2022-04-02

## 2022-04-02 ENCOUNTER — APPOINTMENT (OUTPATIENT)
Dept: CT IMAGING | Facility: IMAGING CENTER | Age: 72
End: 2022-04-02
Payer: COMMERCIAL

## 2022-04-02 DIAGNOSIS — R91.8 OTHER NONSPECIFIC ABNORMAL FINDING OF LUNG FIELD: ICD-10-CM

## 2022-04-02 DIAGNOSIS — D49.0 NEOPLASM OF UNSPECIFIED BEHAVIOR OF DIGESTIVE SYSTEM: Chronic | ICD-10-CM

## 2022-04-02 DIAGNOSIS — Z95.0 PRESENCE OF CARDIAC PACEMAKER: Chronic | ICD-10-CM

## 2022-04-02 DIAGNOSIS — Z90.49 ACQUIRED ABSENCE OF OTHER SPECIFIED PARTS OF DIGESTIVE TRACT: Chronic | ICD-10-CM

## 2022-04-02 DIAGNOSIS — H26.9 UNSPECIFIED CATARACT: Chronic | ICD-10-CM

## 2022-04-02 PROCEDURE — 71250 CT THORAX DX C-: CPT

## 2022-04-02 PROCEDURE — 71250 CT THORAX DX C-: CPT | Mod: 26

## 2022-04-14 ENCOUNTER — APPOINTMENT (OUTPATIENT)
Dept: THORACIC SURGERY | Facility: CLINIC | Age: 72
End: 2022-04-14
Payer: COMMERCIAL

## 2022-04-14 VITALS
HEIGHT: 62 IN | BODY MASS INDEX: 22.08 KG/M2 | DIASTOLIC BLOOD PRESSURE: 76 MMHG | WEIGHT: 120 LBS | HEART RATE: 74 BPM | RESPIRATION RATE: 17 BRPM | SYSTOLIC BLOOD PRESSURE: 121 MMHG | OXYGEN SATURATION: 98 %

## 2022-04-14 PROCEDURE — 99214 OFFICE O/P EST MOD 30 MIN: CPT

## 2022-04-14 PROCEDURE — 99072 ADDL SUPL MATRL&STAF TM PHE: CPT

## 2022-04-16 NOTE — HISTORY OF PRESENT ILLNESS
[FreeTextEntry1] : Mr. 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 Also s/p Lt anterior chest wall PPM placement for unknown reason. He was referred by Dr. Cordell Holguin for new and increasing size lung mets on surveillance CT scan.\par \par CT neck w/ IV contrast on 11/3/21:\par - Stable follow-up CT examination without evidence of residual or recurrent tumor in the left parotidectomy bed.\par - Unchanged lytic lucency within the left side of the mandible of uncertain etiology.\par \par CT chest w/ IV contrast on 11/3/21:\par - New and enlarging left pulmonary metastases.\par - Enlarging right axillary chest wall metastasis.\par \par PET/CT on 12/16/21:\par - Focus of mild FDG activity in left perihilar region is decreased in metabolism (SUV 2.9; image 86; previous SUV 6.0). Resolution of additional previously seen FDG-avid foci in bilateral hilar regions.\par - Nonspecific small, mildly FDG-avid focus of skin thickening in right axillary region, new as compared to prior PET/CT, and corresponding to location of 2 cm peripherally enhancing hypodense cutaneous lesion seen on CT dated 11/3/2021 (SUV 1.7; image 70).\par - New small focus of increased FDG activity in peripheral left lower lobe corresponds to a subcentimeter nodule which is new as compared to prior PET/CT, and which measures 0.7 cm on CT dated 11/3/2021. Due to CT technique, and respiratory motion, comparison with recent diagnostic CT is limited (SUV 1.8; image 102). Tiny, difficult to delineate FDG-avid focus in left upper lobe is unchanged (SUV 2.2; image 73; previous SUV 2.7). New less than 4 mm left lung nodule seen on recent diagnostic CT are not well evaluated due CT technique and are too small to characterize with PET.\par \par CT Chest on 4/2/22:\par - stable several nodule in LLL and along the Lt major fissure, largest 8 mm in posterior basal segment \par \par Pt presents today for follow up. Patient c/o SOB on exertion, denies cough, chest pain, fever, chills.

## 2022-04-16 NOTE — CONSULT LETTER
[Dear  ___] : Dear  [unfilled], [Consult Letter:] : I had the pleasure of evaluating your patient, [unfilled]. [( Thank you for referring [unfilled] for consultation for _____ )] : Thank you for referring [unfilled] for consultation for [unfilled] [Please see my note below.] : Please see my note below. [Consult Closing:] : Thank you very much for allowing me to participate in the care of this patient.  If you have any questions, please do not hesitate to contact me. [Sincerely,] : Sincerely, [FreeTextEntry2] : Dr. Cordell Holguin (MICHELL/ref)\par Dr. Rudy Jacobs (Rad/Onc)\par  [FreeTextEntry3] : Shar Kim MD, MPH \par System Director of Thoracic Surgery \par Director of Comprehensive Lung and Foregut [place] \par Professor Cardiovascular & Thoracic Surgery  \par [facility] [employer] [facility]\par \par [facility] [place]\par [address]\par Oncology [address]\[place] [address]\par Tel: [phone]\par Fax: [phone]\par

## 2022-04-16 NOTE — ASSESSMENT
[FreeTextEntry1] : Mr. 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 Also s/p Lt anterior chest wall PPM placement for unknown reason. He was referred by Dr. Cordell Holguin for new and increasing size lung mets on surveillance CT scan.\par \par CT neck w/ IV contrast on 11/3/21:\par - Stable follow-up CT examination without evidence of residual or recurrent tumor in the left parotidectomy bed.\par - Unchanged lytic lucency within the left side of the mandible of uncertain etiology.\par \par CT chest w/ IV contrast on 11/3/21:\par - New and enlarging left pulmonary metastases.\par - Enlarging right axillary chest wall metastasis.\par \par PET/CT on 12/16/21:\par - Focus of mild FDG activity in left perihilar region is decreased in metabolism (SUV 2.9; image 86; previous SUV 6.0). Resolution of additional previously seen FDG-avid foci in bilateral hilar regions.\par - Nonspecific small, mildly FDG-avid focus of skin thickening in right axillary region, new as compared to prior PET/CT, and corresponding to location of 2 cm peripherally enhancing hypodense cutaneous lesion seen on CT dated 11/3/2021 (SUV 1.7; image 70).\par - New small focus of increased FDG activity in peripheral left lower lobe corresponds to a subcentimeter nodule which is new as compared to prior PET/CT, and which measures 0.7 cm on CT dated 11/3/2021. Due to CT technique, and respiratory motion, comparison with recent diagnostic CT is limited (SUV 1.8; image 102). Tiny, difficult to delineate FDG-avid focus in left upper lobe is unchanged (SUV 2.2; image 73; previous SUV 2.7). New less than 4 mm left lung nodule seen on recent diagnostic CT are not well evaluated due CT technique and are too small to characterize with PET.\par \par CT Chest on 4/2/22:\par - stable several nodule in LLL and along the Lt major fissure, largest 8 mm in posterior basal segment \par \par I have reviewed the patient's medical records and diagnostic images at time of this office consultation and have made the following recommendation:\par 1. CT chest reviewed and explained to patient and his wife, again I recommended a Left VATS, wedge resection, possible pleural biopsy on 05/11/2022. Risks and benefits and alternatives explained to patient, all questions answered, patient agreed to proceed with surgery.\par 2. PFTs. \par 3. Medical and cardiac clearance\par \par \par I personally performed the services described in the documentation, reviewed the documentation recorded by the scribe in my presence and it accurately and completely records my words and actions.\par \par I, Alexandra Parks NP, am scribing for and the presence of TAY Perez, the following sections HISTORY OF PRESENT ILLNESS, PAST MEDICAL/FAMILY/SOCIAL HISTORY; REVIEW OF SYSTEMS; VITAL SIGNS; PHYSICAL EXAM; DISPOSITION.

## 2022-04-18 NOTE — H&P PST ADULT - NSDEVICEINTERDT_GEN_A_OR
Assessment/Plan:    Problem List Items Addressed This Visit        Cardiac/Vascular    Atrial fibrillation    Overview     -followed by cardiology  -currently on labetalol  -eliquis for anticoagulation  -s/p recent cardioversion, failed  -asymptomatic           Essential hypertension - Primary    Overview     -at goal today but patient concerned about dizziness with coreg  -currently on coreg 25 mg BID, telmisartan 80 QHS, Hydralazine 25 mg QHS, clonidine PRN  -intolerant to amlodipine due to swelling  -was on spironolactone but having dizziness so stopped over the weekend  -he has also decreased coreg to 12.5 once daily in the am  -he has noticed a decrease in his dizziness since holding coreg but still having some residual symptoms  -plan to reach out to cardiology for further HTN recs  -continue lifestyle modification with low sodium diet and exercise   -discussed hypertension disease course and importance of treating high blood pressure  -patient understood and advised of risk of untreated blood pressure.  ER precautions were given   for symptoms of hypertensive urgency and emergency.                Immunology/Multi System    Temporal arteritis    Overview     -recent vision changes, referred to retinal specialist  -s/p temporal biopsy normal, however improving with prednisone  -slowly titrating off of prednisone  -follow up with eye specialist this week                   Catrina Yanez MD  _____________________________________________________________________________________________________________________________________________________    CC: BP follow up    HPI:    Patient is in clinic today as an established patient here for blood pressure.    Patient concerned about blood pressure medications. Patient has been experiencing dizziness, worse upon standing. He was concerned that it could be related to his BP medications, particularly the beta blocker. He did reach out to cardiology and was instructed to hold  the aldactone. He did do this but continued to experience dizziness. He then decided to cut his coreg dose to 12.5 mg only once daily. He did have some improvement of dizziness but still having some symptoms. He would like to completely stop beta blocker but he has a hx of a fib. Plan to follow up with cardiology on further recommendations.    No other complaints today. Remaining chronic conditions have been reviewed and remain stable. Further detail as stated above.       Past Medical History:  Past Medical History:   Diagnosis Date    Allergy     Anticoagulant long-term use     Arthritis     Asthma     Atrial fibrillation     Bronchitis     Cataract     Coronary artery disease     stent    General anesthetics causing adverse effect in therapeutic use     hard to awaken    GERD (gastroesophageal reflux disease)     Gout, chronic     Hypertension      Past Surgical History:   Procedure Laterality Date    ANGIOGRAM, CORONARY, WITH LEFT HEART CATHETERIZATION  09/27/2021    Procedure: Angiogram, Coronary, with Left Heart Cath;  Surgeon: Vincent Gonzalez MD;  Location: [facility];  Service: Cardiology;;    BACK SURGERY      L3-5; ruptured disc; laminectomy x 2 surgery    CHOLECYSTECTOMY      CORONARY ANGIOPLASTY WITH STENT PLACEMENT      EYE SURGERY  2012    Cateract    JOINT REPLACEMENT  '08    RT  KNEE    SPINE SURGERY  1988/2004    Lamanectomy '88/Capps '04    TONSILLECTOMY      TOTAL KNEE ARTHROPLASTY      VASECTOMY       Review of patient's allergies indicates:   Allergen Reactions    Sulfa (sulfonamide antibiotics)      Hives    Azithromycin      Diarrhea      Cefaclor Other (See Comments)     Other reaction(s): Hives    Iodine and iodide containing products      Other reaction(s): Unknown    Ivp dye  [iodinated contrast media] Other (See Comments)    Doxycycline Rash     Social History     Tobacco Use    Smoking status: Former Smoker     Packs/day: 1.50     Years: 22.00     Pack years:  33.00     Types: Cigarettes     Quit date: 1992     Years since quittin.9    Smokeless tobacco: Never Used   Substance Use Topics    Alcohol use: No    Drug use: No     Family History   Problem Relation Age of Onset    Cancer Mother     Early death Mother     Arthritis Father     Diabetes Father     Heart disease Father     Hyperlipidemia Father     Hypertension Father      Current Outpatient Medications on File Prior to Visit   Medication Sig Dispense Refill    albuterol (PROVENTIL/VENTOLIN HFA) 90 mcg/actuation inhaler Inhale 2 puffs into the lungs every 6 (six) hours as needed for Wheezing or Shortness of Breath. Rescue 18 g 11    albuterol-ipratropium (DUO-NEB) 2.5 mg-0.5 mg/3 mL nebulizer solution Take 3 mLs by nebulization every 6 (six) hours as needed for Wheezing or Shortness of Breath. Rescue 120 vial 5    allopurinoL (ZYLOPRIM) 300 MG tablet TAKE 1 TABLET BY MOUTH EVERY DAY 90 tablet 0    apixaban (ELIQUIS) 5 mg Tab Take 1 tablet (5 mg total) by mouth 2 (two) times daily. 180 tablet 3    aspirin (ECOTRIN) 81 MG EC tablet Take 81 mg by mouth every Mon, Wed, Fri.      atorvastatin (LIPITOR) 10 MG tablet TK 1 T PO QD 90 tablet 3    carvediloL (COREG) 25 MG tablet Take 1 tablet (25 mg total) by mouth 2 (two) times daily with meals. 60 tablet 11    cloNIDine (CATAPRES) 0.1 MG tablet Take 1 tablet (0.1 mg total) by mouth every 6 (six) hours as needed (As needed for systolic BP >165). 84 tablet 0    coenzyme Q10 200 mg capsule Take 200 mg by mouth once daily.      fluticasone furoate-vilanteroL (BREO ELLIPTA) 200-25 mcg/dose DsDv diskus inhaler INHALE 1 PUFF BY MOUTH AT THE SAME TIME EVERY DAY 1 each 5    furosemide (LASIX) 20 MG tablet Take 0.5 tablets (10 mg total) by mouth once daily. 90 tablet 3    hydrALAZINE (APRESOLINE) 25 MG tablet Take 1 tablet (25 mg total) by mouth every 12 (twelve) hours. (Patient taking differently: Take 25 mg by mouth nightly.) 60 tablet 11     lactobacillus comb no.10 (PROBIOTIC) 20 billion cell Cap Take 1 capsule by mouth once daily. Or as directed on bottle      montelukast (SINGULAIR) 10 mg tablet Take 1 tablet (10 mg total) by mouth once daily. 30 tablet 11    multivitamin (THERAGRAN) per tablet Take 1 tablet by mouth once daily.      naproxen (NAPROSYN) 500 MG tablet TAKE 1 TABLET(500 MG) BY MOUTH TWICE DAILY (Patient taking differently: Take 500 mg by mouth 2 (two) times daily as needed.) 60 tablet 12    omeprazole (PRILOSEC) 10 MG capsule Take 10 mg by mouth once daily.      potassium chloride (KLOR-CON) 10 MEQ TbSR TK 1 T PO ONCE A DAY. 90 tablet 3    predniSONE (DELTASONE) 20 MG tablet Take 10 mg by mouth once daily.      SPIRIVA RESPIMAT 2.5 mcg/actuation inhaler USE 2 INHALATIONS BY MOUTH ONCE DAILY INTO THE LUNGS. CONTROLLER 4 g 11    telmisartan (MICARDIS) 80 MG Tab Take 1 tablet (80 mg total) by mouth once daily. (Patient taking differently: Take 80 mg by mouth once daily. 80 mg PM) 90 tablet 3    urea (CARMOL) 40 % Crea 2 (two) times daily as needed.  0    spironolactone (ALDACTONE) 25 MG tablet Take 1 tablet (25 mg total) by mouth once daily. (Patient not taking: Reported on 4/18/2022) 30 tablet 1     No current facility-administered medications on file prior to visit.       Review of Systems   Constitutional: Negative for chills, diaphoresis, fatigue and fever.   HENT: Negative for congestion, ear pain, postnasal drip, sinus pain and sore throat.    Eyes: Negative for pain and redness.   Respiratory: Negative for cough, chest tightness and shortness of breath.    Cardiovascular: Negative for chest pain and leg swelling.   Gastrointestinal: Negative for abdominal pain, constipation, diarrhea, nausea and vomiting.   Genitourinary: Negative for dysuria and hematuria.   Musculoskeletal: Negative for arthralgias and joint swelling.   Skin: Negative for rash.   Neurological: Positive for dizziness. Negative for syncope and headaches.  "      Vitals:    04/18/22 1056   BP: 139/79   Pulse: 72   Temp: 98.3 °F (36.8 °C)   Weight: 114.3 kg (252 lb)   Height: 6' 1" (1.854 m)       Wt Readings from Last 3 Encounters:   04/18/22 114.3 kg (252 lb)   03/30/22 113.9 kg (251 lb)   03/21/22 113.9 kg (251 lb 1.7 oz)       Physical Exam  Constitutional:       General: He is not in acute distress.     Appearance: Normal appearance. He is well-developed.   HENT:      Head: Normocephalic and atraumatic.   Eyes:      Conjunctiva/sclera: Conjunctivae normal.   Cardiovascular:      Rate and Rhythm: Normal rate and regular rhythm.      Pulses: Normal pulses.      Heart sounds: Normal heart sounds. No murmur heard.  Pulmonary:      Effort: Pulmonary effort is normal. No respiratory distress.      Breath sounds: Normal breath sounds.   Abdominal:      General: Bowel sounds are normal. There is no distension.      Palpations: Abdomen is soft.      Tenderness: There is no abdominal tenderness.   Musculoskeletal:         General: Normal range of motion.      Cervical back: Normal range of motion and neck supple.   Skin:     General: Skin is warm and dry.      Findings: No rash.   Neurological:      General: No focal deficit present.      Mental Status: He is alert and oriented to person, place, and time.   Psychiatric:         Mood and Affect: Mood normal.         Behavior: Behavior normal.         Health Maintenance   Topic Date Due    Lipid Panel  03/30/2023    High Dose Statin  04/18/2023    TETANUS VACCINE  10/09/2031    Hepatitis C Screening  Completed    Abdominal Aortic Aneurysm Screening  Completed       " 14-Feb-2019

## 2022-04-21 DIAGNOSIS — Z01.818 ENCOUNTER FOR OTHER PREPROCEDURAL EXAMINATION: ICD-10-CM

## 2022-05-04 ENCOUNTER — OUTPATIENT (OUTPATIENT)
Dept: OUTPATIENT SERVICES | Facility: HOSPITAL | Age: 72
LOS: 1 days | End: 2022-05-04
Payer: COMMERCIAL

## 2022-05-04 VITALS
WEIGHT: 115.96 LBS | SYSTOLIC BLOOD PRESSURE: 141 MMHG | DIASTOLIC BLOOD PRESSURE: 62 MMHG | HEART RATE: 77 BPM | OXYGEN SATURATION: 99 % | RESPIRATION RATE: 16 BRPM | HEIGHT: 60 IN | TEMPERATURE: 96 F

## 2022-05-04 DIAGNOSIS — D49.0 NEOPLASM OF UNSPECIFIED BEHAVIOR OF DIGESTIVE SYSTEM: Chronic | ICD-10-CM

## 2022-05-04 DIAGNOSIS — R91.8 OTHER NONSPECIFIC ABNORMAL FINDING OF LUNG FIELD: ICD-10-CM

## 2022-05-04 DIAGNOSIS — Z95.0 PRESENCE OF CARDIAC PACEMAKER: Chronic | ICD-10-CM

## 2022-05-04 DIAGNOSIS — E11.9 TYPE 2 DIABETES MELLITUS WITHOUT COMPLICATIONS: ICD-10-CM

## 2022-05-04 DIAGNOSIS — Z90.49 ACQUIRED ABSENCE OF OTHER SPECIFIED PARTS OF DIGESTIVE TRACT: Chronic | ICD-10-CM

## 2022-05-04 DIAGNOSIS — H26.9 UNSPECIFIED CATARACT: Chronic | ICD-10-CM

## 2022-05-04 LAB
A1C WITH ESTIMATED AVERAGE GLUCOSE RESULT: 6.8 % — HIGH (ref 4–5.6)
ALBUMIN SERPL ELPH-MCNC: 4.6 G/DL — SIGNIFICANT CHANGE UP (ref 3.3–5)
ALP SERPL-CCNC: 94 U/L — SIGNIFICANT CHANGE UP (ref 40–120)
ALT FLD-CCNC: 23 U/L — SIGNIFICANT CHANGE UP (ref 4–41)
ANION GAP SERPL CALC-SCNC: 15 MMOL/L — HIGH (ref 7–14)
AST SERPL-CCNC: 30 U/L — SIGNIFICANT CHANGE UP (ref 4–40)
BILIRUB SERPL-MCNC: 0.4 MG/DL — SIGNIFICANT CHANGE UP (ref 0.2–1.2)
BLD GP AB SCN SERPL QL: NEGATIVE — SIGNIFICANT CHANGE UP
BUN SERPL-MCNC: 21 MG/DL — SIGNIFICANT CHANGE UP (ref 7–23)
CALCIUM SERPL-MCNC: 9.4 MG/DL — SIGNIFICANT CHANGE UP (ref 8.4–10.5)
CHLORIDE SERPL-SCNC: 103 MMOL/L — SIGNIFICANT CHANGE UP (ref 98–107)
CO2 SERPL-SCNC: 18 MMOL/L — LOW (ref 22–31)
CREAT SERPL-MCNC: 1.02 MG/DL — SIGNIFICANT CHANGE UP (ref 0.5–1.3)
EGFR: 78 ML/MIN/1.73M2 — SIGNIFICANT CHANGE UP
ESTIMATED AVERAGE GLUCOSE: 148 — SIGNIFICANT CHANGE UP
GLUCOSE SERPL-MCNC: 201 MG/DL — HIGH (ref 70–99)
HCT VFR BLD CALC: 42 % — SIGNIFICANT CHANGE UP (ref 39–50)
HGB BLD-MCNC: 14 G/DL — SIGNIFICANT CHANGE UP (ref 13–17)
MCHC RBC-ENTMCNC: 30.8 PG — SIGNIFICANT CHANGE UP (ref 27–34)
MCHC RBC-ENTMCNC: 33.3 GM/DL — SIGNIFICANT CHANGE UP (ref 32–36)
MCV RBC AUTO: 92.5 FL — SIGNIFICANT CHANGE UP (ref 80–100)
NRBC # BLD: 0 /100 WBCS — SIGNIFICANT CHANGE UP
NRBC # FLD: 0 K/UL — SIGNIFICANT CHANGE UP
PLATELET # BLD AUTO: 189 K/UL — SIGNIFICANT CHANGE UP (ref 150–400)
POTASSIUM SERPL-MCNC: 5.1 MMOL/L — SIGNIFICANT CHANGE UP (ref 3.5–5.3)
POTASSIUM SERPL-SCNC: 5.1 MMOL/L — SIGNIFICANT CHANGE UP (ref 3.5–5.3)
PROT SERPL-MCNC: 7.3 G/DL — SIGNIFICANT CHANGE UP (ref 6–8.3)
RBC # BLD: 4.54 M/UL — SIGNIFICANT CHANGE UP (ref 4.2–5.8)
RBC # FLD: 12.6 % — SIGNIFICANT CHANGE UP (ref 10.3–14.5)
RH IG SCN BLD-IMP: POSITIVE — SIGNIFICANT CHANGE UP
SODIUM SERPL-SCNC: 136 MMOL/L — SIGNIFICANT CHANGE UP (ref 135–145)
WBC # BLD: 6.09 K/UL — SIGNIFICANT CHANGE UP (ref 3.8–10.5)
WBC # FLD AUTO: 6.09 K/UL — SIGNIFICANT CHANGE UP (ref 3.8–10.5)

## 2022-05-04 PROCEDURE — 93010 ELECTROCARDIOGRAM REPORT: CPT

## 2022-05-04 RX ORDER — OMEPRAZOLE AND SODIUM BICARBONATE 40; 1100 MG/1; MG/1
1 CAPSULE, GELATIN COATED ORAL
Qty: 0 | Refills: 0 | DISCHARGE

## 2022-05-04 RX ORDER — ALBUTEROL 90 UG/1
2 AEROSOL, METERED ORAL
Qty: 0 | Refills: 0 | DISCHARGE

## 2022-05-04 RX ORDER — DICLOFENAC SODIUM 30 MG/G
1 GEL TOPICAL
Qty: 0 | Refills: 0 | DISCHARGE

## 2022-05-04 RX ORDER — AMLODIPINE BESYLATE 2.5 MG/1
1 TABLET ORAL
Qty: 0 | Refills: 0 | DISCHARGE

## 2022-05-04 RX ORDER — ATORVASTATIN CALCIUM 80 MG/1
1 TABLET, FILM COATED ORAL
Qty: 0 | Refills: 0 | DISCHARGE

## 2022-05-04 RX ORDER — LORATADINE 10 MG/1
1 TABLET ORAL
Qty: 0 | Refills: 0 | DISCHARGE

## 2022-05-04 RX ORDER — METOPROLOL TARTRATE 50 MG
1 TABLET ORAL
Qty: 0 | Refills: 0 | DISCHARGE

## 2022-05-04 RX ORDER — LINAGLIPTIN 5 MG/1
1 TABLET, FILM COATED ORAL
Qty: 0 | Refills: 0 | DISCHARGE

## 2022-05-04 RX ORDER — SOD,AMMONIUM,POTASSIUM LACTATE
1 CREAM (GRAM) TOPICAL
Qty: 0 | Refills: 0 | DISCHARGE

## 2022-05-04 RX ORDER — INSULIN GLARGINE 100 [IU]/ML
35 INJECTION, SOLUTION SUBCUTANEOUS
Qty: 0 | Refills: 0 | DISCHARGE

## 2022-05-04 RX ORDER — MONTELUKAST 4 MG/1
1 TABLET, CHEWABLE ORAL
Qty: 0 | Refills: 0 | DISCHARGE

## 2022-05-04 RX ORDER — LEVOTHYROXINE SODIUM 125 MCG
1 TABLET ORAL
Qty: 0 | Refills: 0 | DISCHARGE

## 2022-05-04 RX ORDER — DICLOFENAC SODIUM 75 MG/1
1 TABLET, DELAYED RELEASE ORAL
Qty: 0 | Refills: 0 | DISCHARGE

## 2022-05-04 RX ORDER — ASPIRIN/CALCIUM CARB/MAGNESIUM 324 MG
1 TABLET ORAL
Qty: 0 | Refills: 0 | DISCHARGE

## 2022-05-04 RX ORDER — ZINC SULFATE TAB 220 MG (50 MG ZINC EQUIVALENT) 220 (50 ZN) MG
50 TAB ORAL
Qty: 0 | Refills: 0 | DISCHARGE

## 2022-05-04 RX ORDER — LISINOPRIL 2.5 MG/1
1 TABLET ORAL
Qty: 0 | Refills: 0 | DISCHARGE

## 2022-05-04 RX ORDER — ACETAMINOPHEN 500 MG
1 TABLET ORAL
Qty: 0 | Refills: 0 | DISCHARGE

## 2022-05-04 RX ORDER — ERGOCALCIFEROL 1.25 MG/1
1 CAPSULE ORAL
Qty: 0 | Refills: 0 | DISCHARGE

## 2022-05-04 RX ORDER — SODIUM CHLORIDE 9 MG/ML
1000 INJECTION, SOLUTION INTRAVENOUS
Refills: 0 | Status: DISCONTINUED | OUTPATIENT
Start: 2022-05-11 | End: 2022-05-12

## 2022-05-04 RX ORDER — OLMESARTAN MEDOXOMIL 5 MG/1
1 TABLET, FILM COATED ORAL
Qty: 0 | Refills: 0 | DISCHARGE

## 2022-05-04 RX ORDER — BENZOYL PEROXIDE MICRONIZED 5.8 %
1 TOWELETTE (EA) TOPICAL
Qty: 0 | Refills: 0 | DISCHARGE

## 2022-05-04 RX ORDER — ASCORBIC ACID 60 MG
1 TABLET,CHEWABLE ORAL
Qty: 0 | Refills: 0 | DISCHARGE

## 2022-05-04 RX ORDER — DOCUSATE SODIUM 100 MG
1 CAPSULE ORAL
Qty: 0 | Refills: 0 | DISCHARGE

## 2022-05-04 NOTE — H&P PST ADULT - NSANTHOSAYNRD_GEN_A_CORE
"at night when I sleep I feel like I'm having breathing trouble for a long time"/No. JOSUE screening performed.  STOP BANG Legend: 0-2 = LOW Risk; 3-4 = INTERMEDIATE Risk; 5-8 = HIGH Risk

## 2022-05-04 NOTE — H&P PST ADULT - NS MD HP INPLANTS MED DEV
pt. does not have the card with him/Pacemaker pt. does not have the card with him, [facility]/Pacemaker

## 2022-05-04 NOTE — H&P PST ADULT - PROBLEM SELECTOR PLAN 1
Pt. is scheduled for a robotic left VATs...5/11/22.  Pt's. son via phone instructed to reread the instructions to his father closer to the surgery date and that last night's dose of ASA is the last dose until after the surgery.

## 2022-05-04 NOTE — H&P PST ADULT - HISTORY OF PRESENT ILLNESS
Pt. is a 70 yo male  Pt. is a 70 yo male that is unaware of what surgery he is having and who the Surgeon is.  Pt. is having lung surgery.

## 2022-05-04 NOTE — H&P PST ADULT - VENOUS THROMBOEMBOLISM AGE
Pt here c/o ongoing cough x 1 week. Reports she was diagnosed with the flu last week. States she has recently started wheezing along with cough. Subjective: (As above and below)     Chief Complaint   Patient presents with    Wheezing    Cough     she is a 75y.o. year old female who presents for evaluation. Reviewed PmHx, RxHx, FmHx, SocHx, AllgHx and updated in chart. Review of Systems - negative except as listed above    Objective:     Vitals:    03/20/18 1531   BP: 113/75   Pulse: 74   Resp: 20   Temp: 98.3 °F (36.8 °C)   TempSrc: Oral   SpO2: 97%   Weight: 179 lb (81.2 kg)   Height: 5' 4\" (1.626 m)     Physical Examination: General appearance - alert, well appearing, and in no distress  Mental status - normal mood, behavior, speech, dress, motor activity, and thought processes  Mouth - mucous membranes moist, pharynx normal without lesions  Chest - decreased air entry noted right base, with wheezing in upper airways  Heart - normal rate, regular rhythm, normal S1, S2, no murmurs, rubs, clicks or gallops  Musculoskeletal - no joint tenderness, deformity or swelling    Assessment/ Plan:   1. Bronchitis  Orders Placed This Encounter    cefdinir (OMNICEF) 300 mg capsule     Sig: Take 1 Cap by mouth two (2) times a day for 10 days. Dispense:  20 Cap     Refill:  0    guaiFENesin ER (MUCINEX) 600 mg ER tablet     Sig: Take 1 Tab by mouth two (2) times a day. Dispense:  60 Tab     Refill:  0       2. Influenza with respiratory manifestation  -fever resolved     Follow-up Disposition: As needed  I have discussed the diagnosis with the patient and the intended plan as seen in the above orders. The patient has received an after-visit summary and questions were answered concerning future plans.      Medication Side Effects and Warnings were discussed with patient: yes  Patient Labs were reviewed: yes  Patient Past Records were reviewed:  yes    Kamaljit Washburn M.D.
(2) age 61-74 years

## 2022-05-04 NOTE — H&P PST ADULT - ASSESSMENT
Pt. is a 70 yo Frisian speaking male.  Pt. is unaware of what surgery or who the Surgeon is.  After the visit spoke with pt's. son on pt's. wife's phone and told his to speak with the Surgeon's office to inform the pt. of what surgery he is having 5/11/22.   Pt. is a 72 yo Syriac speaking male.  Pt. is unaware of what surgery or who the Surgeon is.  After the visit spoke with pt's. son on pt's. wife's phone and told his to speak with the Surgeon's office to inform the pt. of what surgery he is having 5/11/22.  Informed the pt's. son that the pt. has to have a COVID test 3 days prior to surgery.  Son-Ivana [phone]    Pt's. wife gave cardiac clearance letter from 1/26/22 and ECHO results from 4/5/22.  They're in the chart.

## 2022-05-10 ENCOUNTER — APPOINTMENT (OUTPATIENT)
Dept: PULMONOLOGY | Facility: CLINIC | Age: 72
End: 2022-05-10
Payer: COMMERCIAL

## 2022-05-10 ENCOUNTER — TRANSCRIPTION ENCOUNTER (OUTPATIENT)
Age: 72
End: 2022-05-10

## 2022-05-10 PROCEDURE — 94010 BREATHING CAPACITY TEST: CPT

## 2022-05-10 PROCEDURE — 94726 PLETHYSMOGRAPHY LUNG VOLUMES: CPT

## 2022-05-10 PROCEDURE — 99072 ADDL SUPL MATRL&STAF TM PHE: CPT

## 2022-05-10 PROCEDURE — 94729 DIFFUSING CAPACITY: CPT

## 2022-05-10 NOTE — ASU PATIENT PROFILE, ADULT - FALL HARM RISK - UNIVERSAL INTERVENTIONS
Bed in lowest position, wheels locked, appropriate side rails in place/Call bell, personal items and telephone in reach/Instruct patient to call for assistance before getting out of bed or chair/Non-slip footwear when patient is out of bed/[place] to call system/Physically safe environment - no spills, clutter or unnecessary equipment/Purposeful Proactive Rounding/Room/bathroom lighting operational, light cord in reach

## 2022-05-11 ENCOUNTER — INPATIENT (INPATIENT)
Facility: HOSPITAL | Age: 72
LOS: 1 days | Discharge: HOME | End: 2022-05-13
Attending: THORACIC SURGERY (CARDIOTHORACIC VASCULAR SURGERY) | Admitting: THORACIC SURGERY (CARDIOTHORACIC VASCULAR SURGERY)
Payer: MEDICARE

## 2022-05-11 ENCOUNTER — RESULT REVIEW (OUTPATIENT)
Age: 72
End: 2022-05-11

## 2022-05-11 ENCOUNTER — APPOINTMENT (OUTPATIENT)
Dept: THORACIC SURGERY | Facility: HOSPITAL | Age: 72
End: 2022-05-11

## 2022-05-11 VITALS
SYSTOLIC BLOOD PRESSURE: 140 MMHG | HEIGHT: 60 IN | OXYGEN SATURATION: 99 % | TEMPERATURE: 97 F | HEART RATE: 65 BPM | DIASTOLIC BLOOD PRESSURE: 57 MMHG | RESPIRATION RATE: 18 BRPM | WEIGHT: 115.96 LBS

## 2022-05-11 DIAGNOSIS — R91.8 OTHER NONSPECIFIC ABNORMAL FINDING OF LUNG FIELD: ICD-10-CM

## 2022-05-11 DIAGNOSIS — H26.9 UNSPECIFIED CATARACT: Chronic | ICD-10-CM

## 2022-05-11 DIAGNOSIS — Z90.49 ACQUIRED ABSENCE OF OTHER SPECIFIED PARTS OF DIGESTIVE TRACT: Chronic | ICD-10-CM

## 2022-05-11 DIAGNOSIS — Z95.0 PRESENCE OF CARDIAC PACEMAKER: Chronic | ICD-10-CM

## 2022-05-11 DIAGNOSIS — D49.0 NEOPLASM OF UNSPECIFIED BEHAVIOR OF DIGESTIVE SYSTEM: Chronic | ICD-10-CM

## 2022-05-11 LAB
GLUCOSE BLDC GLUCOMTR-MCNC: 143 MG/DL — HIGH (ref 70–99)
GLUCOSE BLDC GLUCOMTR-MCNC: 248 MG/DL — HIGH (ref 70–99)
GLUCOSE BLDC GLUCOMTR-MCNC: 263 MG/DL — HIGH (ref 70–99)
GLUCOSE BLDC GLUCOMTR-MCNC: 307 MG/DL — HIGH (ref 70–99)

## 2022-05-11 PROCEDURE — 99233 SBSQ HOSP IP/OBS HIGH 50: CPT

## 2022-05-11 PROCEDURE — 71045 X-RAY EXAM CHEST 1 VIEW: CPT | Mod: 26

## 2022-05-11 PROCEDURE — 88307 TISSUE EXAM BY PATHOLOGIST: CPT | Mod: 26

## 2022-05-11 PROCEDURE — 88360 TUMOR IMMUNOHISTOCHEM/MANUAL: CPT | Mod: 26

## 2022-05-11 PROCEDURE — 88342 IMHCHEM/IMCYTCHM 1ST ANTB: CPT | Mod: 26,59

## 2022-05-11 PROCEDURE — 88341 IMHCHEM/IMCYTCHM EA ADD ANTB: CPT | Mod: 26,59

## 2022-05-11 PROCEDURE — 88305 TISSUE EXAM BY PATHOLOGIST: CPT | Mod: 26

## 2022-05-11 DEVICE — CHEST DRAIN THORACIC ARGYLE PVC 24FR STRAIGHT: Type: IMPLANTABLE DEVICE | Status: FUNCTIONAL

## 2022-05-11 DEVICE — STAPLER COVIDIEN TRI-STAPLE 45MM PURPLE RELOAD: Type: IMPLANTABLE DEVICE | Status: FUNCTIONAL

## 2022-05-11 RX ORDER — SODIUM CHLORIDE 9 MG/ML
1000 INJECTION, SOLUTION INTRAVENOUS
Refills: 0 | Status: DISCONTINUED | OUTPATIENT
Start: 2022-05-11 | End: 2022-05-12

## 2022-05-11 RX ORDER — HYDROMORPHONE HYDROCHLORIDE 2 MG/ML
0.5 INJECTION INTRAMUSCULAR; INTRAVENOUS; SUBCUTANEOUS
Refills: 0 | Status: DISCONTINUED | OUTPATIENT
Start: 2022-05-11 | End: 2022-05-12

## 2022-05-11 RX ORDER — INSULIN GLARGINE 100 [IU]/ML
16 INJECTION, SOLUTION SUBCUTANEOUS EVERY MORNING
Refills: 0 | Status: DISCONTINUED | OUTPATIENT
Start: 2022-05-12 | End: 2022-05-12

## 2022-05-11 RX ORDER — DEXTROSE 50 % IN WATER 50 %
12.5 SYRINGE (ML) INTRAVENOUS ONCE
Refills: 0 | Status: DISCONTINUED | OUTPATIENT
Start: 2022-05-11 | End: 2022-05-12

## 2022-05-11 RX ORDER — GABAPENTIN 400 MG/1
100 CAPSULE ORAL ONCE
Refills: 0 | Status: COMPLETED | OUTPATIENT
Start: 2022-05-11 | End: 2022-05-11

## 2022-05-11 RX ORDER — DEXTROSE 50 % IN WATER 50 %
25 SYRINGE (ML) INTRAVENOUS ONCE
Refills: 0 | Status: DISCONTINUED | OUTPATIENT
Start: 2022-05-11 | End: 2022-05-13

## 2022-05-11 RX ORDER — ONDANSETRON 8 MG/1
4 TABLET, FILM COATED ORAL EVERY 6 HOURS
Refills: 0 | Status: DISCONTINUED | OUTPATIENT
Start: 2022-05-11 | End: 2022-05-13

## 2022-05-11 RX ORDER — ACETAMINOPHEN 500 MG
1000 TABLET ORAL ONCE
Refills: 0 | Status: DISCONTINUED | OUTPATIENT
Start: 2022-05-12 | End: 2022-05-12

## 2022-05-11 RX ORDER — ATORVASTATIN CALCIUM 80 MG/1
1 TABLET, FILM COATED ORAL
Qty: 0 | Refills: 0 | DISCHARGE

## 2022-05-11 RX ORDER — LEVOTHYROXINE SODIUM 125 MCG
25 TABLET ORAL DAILY
Refills: 0 | Status: DISCONTINUED | OUTPATIENT
Start: 2022-05-11 | End: 2022-05-13

## 2022-05-11 RX ORDER — AMLODIPINE BESYLATE 2.5 MG/1
1 TABLET ORAL
Qty: 0 | Refills: 0 | DISCHARGE

## 2022-05-11 RX ORDER — MONTELUKAST 4 MG/1
10 TABLET, CHEWABLE ORAL AT BEDTIME
Refills: 0 | Status: DISCONTINUED | OUTPATIENT
Start: 2022-05-11 | End: 2022-05-13

## 2022-05-11 RX ORDER — MONTELUKAST 4 MG/1
1 TABLET, CHEWABLE ORAL
Qty: 0 | Refills: 0 | DISCHARGE

## 2022-05-11 RX ORDER — INSULIN GLARGINE 100 [IU]/ML
20 INJECTION, SOLUTION SUBCUTANEOUS
Qty: 0 | Refills: 0 | DISCHARGE

## 2022-05-11 RX ORDER — HEPARIN SODIUM 5000 [USP'U]/ML
5000 INJECTION INTRAVENOUS; SUBCUTANEOUS ONCE
Refills: 0 | Status: COMPLETED | OUTPATIENT
Start: 2022-05-11 | End: 2022-05-11

## 2022-05-11 RX ORDER — ALBUTEROL 90 UG/1
2 AEROSOL, METERED ORAL
Qty: 0 | Refills: 0 | DISCHARGE

## 2022-05-11 RX ORDER — DEXTROSE 50 % IN WATER 50 %
15 SYRINGE (ML) INTRAVENOUS ONCE
Refills: 0 | Status: DISCONTINUED | OUTPATIENT
Start: 2022-05-11 | End: 2022-05-12

## 2022-05-11 RX ORDER — ALBUTEROL 90 UG/1
2 AEROSOL, METERED ORAL EVERY 6 HOURS
Refills: 0 | Status: DISCONTINUED | OUTPATIENT
Start: 2022-05-11 | End: 2022-05-13

## 2022-05-11 RX ORDER — ACETAMINOPHEN 500 MG
1000 TABLET ORAL ONCE
Refills: 0 | Status: DISCONTINUED | OUTPATIENT
Start: 2022-05-11 | End: 2022-05-12

## 2022-05-11 RX ORDER — NALOXONE HYDROCHLORIDE 4 MG/.1ML
0.1 SPRAY NASAL
Refills: 0 | Status: DISCONTINUED | OUTPATIENT
Start: 2022-05-11 | End: 2022-05-13

## 2022-05-11 RX ORDER — ASCORBIC ACID 60 MG
1 TABLET,CHEWABLE ORAL
Qty: 0 | Refills: 0 | DISCHARGE

## 2022-05-11 RX ORDER — DEXTROSE 50 % IN WATER 50 %
25 SYRINGE (ML) INTRAVENOUS ONCE
Refills: 0 | Status: DISCONTINUED | OUTPATIENT
Start: 2022-05-11 | End: 2022-05-12

## 2022-05-11 RX ORDER — ATORVASTATIN CALCIUM 80 MG/1
10 TABLET, FILM COATED ORAL AT BEDTIME
Refills: 0 | Status: DISCONTINUED | OUTPATIENT
Start: 2022-05-11 | End: 2022-05-13

## 2022-05-11 RX ORDER — LISINOPRIL 2.5 MG/1
1 TABLET ORAL
Qty: 0 | Refills: 0 | DISCHARGE

## 2022-05-11 RX ORDER — INSULIN LISPRO 100/ML
VIAL (ML) SUBCUTANEOUS
Refills: 0 | Status: DISCONTINUED | OUTPATIENT
Start: 2022-05-11 | End: 2022-05-12

## 2022-05-11 RX ORDER — DICLOFENAC SODIUM 30 MG/G
1 GEL TOPICAL
Qty: 0 | Refills: 0 | DISCHARGE

## 2022-05-11 RX ORDER — ACETAMINOPHEN 500 MG
1000 TABLET ORAL ONCE
Refills: 0 | Status: COMPLETED | OUTPATIENT
Start: 2022-05-11 | End: 2022-05-11

## 2022-05-11 RX ORDER — ASPIRIN/CALCIUM CARB/MAGNESIUM 324 MG
1 TABLET ORAL
Qty: 0 | Refills: 0 | DISCHARGE

## 2022-05-11 RX ORDER — LEVOTHYROXINE SODIUM 125 MCG
1 TABLET ORAL
Qty: 0 | Refills: 0 | DISCHARGE

## 2022-05-11 RX ORDER — ACETAMINOPHEN 500 MG
975 TABLET ORAL ONCE
Refills: 0 | Status: COMPLETED | OUTPATIENT
Start: 2022-05-11 | End: 2022-05-11

## 2022-05-11 RX ORDER — GLUCAGON INJECTION, SOLUTION 0.5 MG/.1ML
1 INJECTION, SOLUTION SUBCUTANEOUS ONCE
Refills: 0 | Status: DISCONTINUED | OUTPATIENT
Start: 2022-05-11 | End: 2022-05-12

## 2022-05-11 RX ORDER — HYDROMORPHONE HYDROCHLORIDE 2 MG/ML
30 INJECTION INTRAMUSCULAR; INTRAVENOUS; SUBCUTANEOUS
Refills: 0 | Status: DISCONTINUED | OUTPATIENT
Start: 2022-05-11 | End: 2022-05-12

## 2022-05-11 RX ORDER — INSULIN LISPRO 100/ML
VIAL (ML) SUBCUTANEOUS AT BEDTIME
Refills: 0 | Status: DISCONTINUED | OUTPATIENT
Start: 2022-05-11 | End: 2022-05-13

## 2022-05-11 RX ORDER — HEPARIN SODIUM 5000 [USP'U]/ML
2500 INJECTION INTRAVENOUS; SUBCUTANEOUS EVERY 12 HOURS
Refills: 0 | Status: DISCONTINUED | OUTPATIENT
Start: 2022-05-11 | End: 2022-05-13

## 2022-05-11 RX ORDER — ASPIRIN/CALCIUM CARB/MAGNESIUM 324 MG
81 TABLET ORAL DAILY
Refills: 0 | Status: DISCONTINUED | OUTPATIENT
Start: 2022-05-12 | End: 2022-05-13

## 2022-05-11 RX ADMIN — Medication 400 MILLIGRAM(S): at 17:09

## 2022-05-11 RX ADMIN — HEPARIN SODIUM 5000 UNIT(S): 5000 INJECTION INTRAVENOUS; SUBCUTANEOUS at 10:01

## 2022-05-11 RX ADMIN — HEPARIN SODIUM 2500 UNIT(S): 5000 INJECTION INTRAVENOUS; SUBCUTANEOUS at 18:11

## 2022-05-11 RX ADMIN — Medication 3: at 18:11

## 2022-05-11 RX ADMIN — HYDROMORPHONE HYDROCHLORIDE 30 MILLILITER(S): 2 INJECTION INTRAMUSCULAR; INTRAVENOUS; SUBCUTANEOUS at 19:26

## 2022-05-11 RX ADMIN — Medication 975 MILLIGRAM(S): at 10:01

## 2022-05-11 RX ADMIN — GABAPENTIN 100 MILLIGRAM(S): 400 CAPSULE ORAL at 10:01

## 2022-05-11 RX ADMIN — Medication 1000 MILLIGRAM(S): at 17:39

## 2022-05-11 RX ADMIN — MONTELUKAST 10 MILLIGRAM(S): 4 TABLET, CHEWABLE ORAL at 21:56

## 2022-05-11 RX ADMIN — Medication 25 MICROGRAM(S): at 18:13

## 2022-05-11 RX ADMIN — ATORVASTATIN CALCIUM 10 MILLIGRAM(S): 80 TABLET, FILM COATED ORAL at 21:56

## 2022-05-11 RX ADMIN — Medication 2: at 21:56

## 2022-05-11 NOTE — PATIENT PROFILE ADULT - FALL HARM RISK - HARM RISK INTERVENTIONS

## 2022-05-11 NOTE — ASU PREOP CHECKLIST - SELECT TESTS ORDERED
BMP/CBC/Type and Cross/Type and Screen/COVID-19 /BMP/CBC/Type and Cross/Type and Screen/COVID-19 /BMP/CBC/Type and Cross/Type and Screen/POCT Blood Glucose/COVID-19

## 2022-05-11 NOTE — BRIEF OPERATIVE NOTE - COMMENTS
ALEX Palacios provided direct first assist support to the surgeon during this surgical procedure. My involvement included positioning, prepping and draping the patient prior to surgery,  robotic port placement, Robotic docking, robotic instrument insertion and removal, ensuring clear visibility and exposure for the surgeon by using instruments such as retractors, suction and sponges, retrieving specimens from the operative field, closing surgical incisions, undocking the robot, stapling tissues and vessels, and dressing wounds. As well as other tasks as directed by the surgeon.

## 2022-05-11 NOTE — PROGRESS NOTE ADULT - SUBJECTIVE AND OBJECTIVE BOX
JUDY BOOGIEFELISANGHIA      71y   Male   [id number]         No Known Allergies             Daily Height in cm: 152.4 (11 May 2022 09:31)    Daily Drug Dosing Weight  Height (cm): 152.4 (11 May 2022 09:31)  Weight (kg): 52.6 (11 May 2022 09:31)  BMI (kg/m2): 22.6 (11 May 2022 09:31)  BSA (m2): 1.48 (11 May 2022 09:31)    HPI: Pt. is a 70 yo male that is unaware of what surgery he is having and who the Surgeon is.  Pt. is having lung surgery.    Procedure: FB, Robo LVATS, pleural biopsy, LLL wedge resection. 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   GERD  Gout  Tumor of right  parotid gland  Hypothyroidism      Postop course:     Patient reports moderate pain at chest wall incision sites which is worse with coughing and deep breathing without associated fever or dyspnea. Pain is improved with use of PCA and  oral pain meds.         Home Medications:  amLODIPine 5 mg oral tablet: 1 tab(s) orally once a day (at bedtime) (11 May 2022 09:59)  aspirin 81 mg oral tablet: 1 tab(s) orally once a day PM (11 May 2022 09:59)  atorvastatin 10 mg oral tablet: 1 tab(s) orally once a day (at bedtime) (11 May 2022 09:59)  Lantus 100 units/mL subcutaneous solution: 20 unit(s) subcutaneous once a day AM (11 May 2022 09:59)  lisinopril 5 mg oral tablet: 1 tab(s) orally once a day AM (11 May 2022 09:59)  montelukast 10 mg oral tablet: 1 tab(s) orally once a day (at bedtime) (11 May 2022 09:59)  Synthroid 25 mcg (0.025 mg) oral tablet: 1 tab(s) orally once a day AM (11 May 2022 09:59)  Ventolin HFA 90 mcg/inh inhalation aerosol: 2 puff(s) inhaled every 6 hours, As Needed (11 May 2022 09:59)  vitamin B complex: 1 cap(s) orally once a day AM (11 May 2022 09:59)  Vitamin C: 1 tab(s) orally once a day PM (11 May 2022 09:59)    PAST MEDICAL & SURGICAL HISTORY:  HTN (hypertension)      HLD (hyperlipidemia)      Kidney disease      DM (diabetes mellitus)  x 16 years ; fs  4/09/19 ;      Gout      Hypertension, unspecified type      DM (diabetes mellitus)      Kidney disease      H/O gastroesophageal reflux (GERD)      Coronary artery disease  s/p angiogram with stent 2011      Cardiac pacemaker  2014      Tumor of parotid gland  right side      History of cholecystectomy  2009      H/O cardiac pacemaker  2014      Cataract  Excisiion right eye      Tumor of parotid gland  s/p resection        Vital Signs Last 24 Hrs  T(C): 35.8 (11 May 2022 14:00), Max: 36.3 (11 May 2022 09:31)  T(F): 96.5 (11 May 2022 14:00), Max: 97.4 (11 May 2022 09:31)  HR: 91 (11 May 2022 15:00) (65 - 91)  BP: 116/49 (11 May 2022 15:00) (116/49 - 157/72)  BP(mean): 73 (11 May 2022 14:00) (73 - 96)  RR: 18 (11 May 2022 15:00) (12 - 18)  SpO2: 95% (11 May 2022 15:00) (95% - 99%)  I&O's Detail    CAPILLARY BLOOD GLUCOSE        Home Medications:  amLODIPine 5 mg oral tablet: 1 tab(s) orally once a day (at bedtime) (11 May 2022 09:59)  aspirin 81 mg oral tablet: 1 tab(s) orally once a day PM (11 May 2022 09:59)  atorvastatin 10 mg oral tablet: 1 tab(s) orally once a day (at bedtime) (11 May 2022 09:59)  Lantus 100 units/mL subcutaneous solution: 20 unit(s) subcutaneous once a day AM (11 May 2022 09:59)  lisinopril 5 mg oral tablet: 1 tab(s) orally once a day AM (11 May 2022 09:59)  montelukast 10 mg oral tablet: 1 tab(s) orally once a day (at bedtime) (11 May 2022 09:59)  Synthroid 25 mcg (0.025 mg) oral tablet: 1 tab(s) orally once a day AM (11 May 2022 09:59)  Ventolin HFA 90 mcg/inh inhalation aerosol: 2 puff(s) inhaled every 6 hours, As Needed (11 May 2022 09:59)  vitamin B complex: 1 cap(s) orally once a day AM (11 May 2022 09:59)  Vitamin C: 1 tab(s) orally once a day PM (11 May 2022 09:59)    MEDICATIONS  (STANDING):  atorvastatin 10 milliGRAM(s) Oral at bedtime  dextrose 5%. 1000 milliLiter(s) (50 mL/Hr) IV Continuous <Continuous>  dextrose 5%. 1000 milliLiter(s) (100 mL/Hr) IV Continuous <Continuous>  dextrose 50% Injectable 25 Gram(s) IV Push once  dextrose 50% Injectable 12.5 Gram(s) IV Push once  dextrose 50% Injectable 25 Gram(s) IV Push once  glucagon  Injectable 1 milliGRAM(s) IntraMuscular once  heparin   Injectable 2500 Unit(s) SubCutaneous every 12 hours  HYDROmorphone PCA (1 mG/mL) 30 milliLiter(s) PCA Continuous PCA Continuous  insulin lispro (ADMELOG) corrective regimen sliding scale   SubCutaneous three times a day before meals  insulin lispro (ADMELOG) corrective regimen sliding scale   SubCutaneous at bedtime  lactated ringers. 1000 milliLiter(s) (30 mL/Hr) IV Continuous <Continuous>  levothyroxine 25 MICROGram(s) Oral daily  montelukast 10 milliGRAM(s) Oral at bedtime    MEDICATIONS  (PRN):  ALBUTerol    90 MICROgram(s) HFA Inhaler 2 Puff(s) Inhalation every 6 hours PRN Shortness of Breath and/or Wheezing  dextrose Oral Gel 15 Gram(s) Oral once PRN Blood Glucose LESS THAN 70 milliGRAM(s)/deciliter  HYDROmorphone PCA (1 mG/mL) Rescue Clinician Bolus 0.5 milliGRAM(s) IV Push every 15 minutes PRN for Pain Scale GREATER THAN 6  naloxone Injectable 0.1 milliGRAM(s) IV Push every 3 minutes PRN For ANY of the following changes in patient status:  A. RR LESS THAN 10 breaths per minute, B. Oxygen saturation LESS THAN 90%, C. Sedation score of 6  ondansetron Injectable 4 milliGRAM(s) IV Push every 6 hours PRN Nausea        Physical exam:                             General:               Pt is awake, alert,  appears to be in pain but not in distress                              Eyes:                     Sclera white, Conjunctiva normal, Eyelids normal, Pupils symmetrical                                                Neuro:                  Nonfocal                             Psych:                   A&Ox3                          Cardiovascular:   S1 & S2, regular / irregular                          Respiratory:         Air entry is fair and equal on both sides, has bilateral conducted sounds                           GI:                          Soft, nondistended and nontender, Bowel sounds active                            Ext:                        No cyanosis or edema     Labs:                                                                 CXR:  5/11/2022: Postop changes, left chest tube in place. Mild bilateral congestion      Plan:  General: 71yMale s/p  FB, Robo LVATS, pleural biopsy, LLL wedge resection. Frozen- c/w metastatic disease 5/11/2022, experiencing  pain with deep breathing.                             Neuro:                                         Pain control with PCA /  Tylenol PRN                            Cardiovascular:                                          Telemetry (medical test) - Reviewed by me today independently. Normal sinus rhythm with some PVCs / A-fib.                                          Continue hemodynamic monitoring to prevent decompensation    HTN: Monitor hemodynamic status and restart Norvasc    HLD: On Lipitor    CAD: Continue ASA                            Respiratory:                                         Postop hypoxemia requiring O2 via nasal cannula - Wean nasal cannula for goal O2sat above 92.                                              Obtain CXR. Incentive spirometry.                                                   Chest PT and frequent suctioning. Continue bronchodilators, pulmozyme and inhaled 3% saline                                                      OOB to chair & ambulate w/ assistance.                                                           Continuous pulse oximetry for support & to prevent decompensation.                                         Monitor chest tube output                                         Chest tube to suction                                                                                           GI                                         On puree diet, advance to DASH / diabetic  diet as tolerated                                         Continue Zofran / Reglan for nausea - PRN                                         Continue bowel regimen	                                                                 Renal:                                         Continue LR  30cc/hr                                         Monitor I/Os and electrolytes                                                                                        Hem/ Onc:                                         DVT prophylaxis with SQ Heparin and SCDs                                         Monitor chest tube output &  signs of bleeding.                                          Follow CBC in AM                           Infectious disease:                                            Monitor for fever / leukocytosis.                                          All surgical incision / chest tube  sites look clean                            Endocrine                                             DM-2 / Hyperglycemia: Hold Lantus and Continue Accu-Checks with coverage.                                            Hypothyroidism: Continue Synthroid       Pertinent clinical, laboratory, radiographic, hemodynamic, echocardiographic, respiratory data, microbiologic data and chart were reviewed and analyzed frequently throughout the course of the day and night.     Patient seen, examined and plan discussed with CT Surgeon Dr. Kim / CTICU team during rounds.  OOB to chair and ambulate as tolerated.   Status discussed with patient  and updated plan of care  I have spent 40 minutes with this patient including 20 minutes of time coordinating care in the ICU.          Eduin Knutson MD

## 2022-05-12 LAB
ANION GAP SERPL CALC-SCNC: 15 MMOL/L — HIGH (ref 7–14)
ANION GAP SERPL CALC-SCNC: 15 MMOL/L — HIGH (ref 7–14)
ANION GAP SERPL CALC-SCNC: 18 MMOL/L — HIGH (ref 7–14)
BASOPHILS # BLD AUTO: 0.01 K/UL — SIGNIFICANT CHANGE UP (ref 0–0.2)
BASOPHILS NFR BLD AUTO: 0.1 % — SIGNIFICANT CHANGE UP (ref 0–2)
BUN SERPL-MCNC: 34 MG/DL — HIGH (ref 7–23)
BUN SERPL-MCNC: 37 MG/DL — HIGH (ref 7–23)
BUN SERPL-MCNC: 39 MG/DL — HIGH (ref 7–23)
CALCIUM SERPL-MCNC: 10.7 MG/DL — HIGH (ref 8.4–10.5)
CALCIUM SERPL-MCNC: 9.3 MG/DL — SIGNIFICANT CHANGE UP (ref 8.4–10.5)
CALCIUM SERPL-MCNC: 9.4 MG/DL — SIGNIFICANT CHANGE UP (ref 8.4–10.5)
CHLORIDE SERPL-SCNC: 92 MMOL/L — LOW (ref 98–107)
CHLORIDE SERPL-SCNC: 93 MMOL/L — LOW (ref 98–107)
CHLORIDE SERPL-SCNC: 93 MMOL/L — LOW (ref 98–107)
CO2 SERPL-SCNC: 21 MMOL/L — LOW (ref 22–31)
CO2 SERPL-SCNC: 21 MMOL/L — LOW (ref 22–31)
CO2 SERPL-SCNC: 25 MMOL/L — SIGNIFICANT CHANGE UP (ref 22–31)
CREAT SERPL-MCNC: 1.33 MG/DL — HIGH (ref 0.5–1.3)
CREAT SERPL-MCNC: 1.41 MG/DL — HIGH (ref 0.5–1.3)
CREAT SERPL-MCNC: 1.5 MG/DL — HIGH (ref 0.5–1.3)
EGFR: 49 ML/MIN/1.73M2 — LOW
EGFR: 53 ML/MIN/1.73M2 — LOW
EGFR: 56 ML/MIN/1.73M2 — LOW
EOSINOPHIL # BLD AUTO: 0 K/UL — SIGNIFICANT CHANGE UP (ref 0–0.5)
EOSINOPHIL NFR BLD AUTO: 0 % — SIGNIFICANT CHANGE UP (ref 0–6)
GLUCOSE BLDC GLUCOMTR-MCNC: 154 MG/DL — HIGH (ref 70–99)
GLUCOSE BLDC GLUCOMTR-MCNC: 186 MG/DL — HIGH (ref 70–99)
GLUCOSE BLDC GLUCOMTR-MCNC: 206 MG/DL — HIGH (ref 70–99)
GLUCOSE BLDC GLUCOMTR-MCNC: 312 MG/DL — HIGH (ref 70–99)
GLUCOSE SERPL-MCNC: 184 MG/DL — HIGH (ref 70–99)
GLUCOSE SERPL-MCNC: 331 MG/DL — HIGH (ref 70–99)
GLUCOSE SERPL-MCNC: 386 MG/DL — HIGH (ref 70–99)
HCT VFR BLD CALC: 39.3 % — SIGNIFICANT CHANGE UP (ref 39–50)
HGB BLD-MCNC: 13.5 G/DL — SIGNIFICANT CHANGE UP (ref 13–17)
IANC: 14.74 K/UL — HIGH (ref 1.8–7.4)
IMM GRANULOCYTES NFR BLD AUTO: 0.4 % — SIGNIFICANT CHANGE UP (ref 0–1.5)
LYMPHOCYTES # BLD AUTO: 0.46 K/UL — LOW (ref 1–3.3)
LYMPHOCYTES # BLD AUTO: 2.9 % — LOW (ref 13–44)
MAGNESIUM SERPL-MCNC: 1.8 MG/DL — SIGNIFICANT CHANGE UP (ref 1.6–2.6)
MAGNESIUM SERPL-MCNC: 1.8 MG/DL — SIGNIFICANT CHANGE UP (ref 1.6–2.6)
MAGNESIUM SERPL-MCNC: 2 MG/DL — SIGNIFICANT CHANGE UP (ref 1.6–2.6)
MCHC RBC-ENTMCNC: 31.3 PG — SIGNIFICANT CHANGE UP (ref 27–34)
MCHC RBC-ENTMCNC: 34.4 GM/DL — SIGNIFICANT CHANGE UP (ref 32–36)
MCV RBC AUTO: 91 FL — SIGNIFICANT CHANGE UP (ref 80–100)
MONOCYTES # BLD AUTO: 0.73 K/UL — SIGNIFICANT CHANGE UP (ref 0–0.9)
MONOCYTES NFR BLD AUTO: 4.6 % — SIGNIFICANT CHANGE UP (ref 2–14)
NEUTROPHILS # BLD AUTO: 14.74 K/UL — HIGH (ref 1.8–7.4)
NEUTROPHILS NFR BLD AUTO: 92 % — HIGH (ref 43–77)
NRBC # BLD: 0 /100 WBCS — SIGNIFICANT CHANGE UP
NRBC # FLD: 0 K/UL — SIGNIFICANT CHANGE UP
PHOSPHATE SERPL-MCNC: 3.2 MG/DL — SIGNIFICANT CHANGE UP (ref 2.5–4.5)
PHOSPHATE SERPL-MCNC: 3.6 MG/DL — SIGNIFICANT CHANGE UP (ref 2.5–4.5)
PHOSPHATE SERPL-MCNC: 4 MG/DL — SIGNIFICANT CHANGE UP (ref 2.5–4.5)
PLATELET # BLD AUTO: 184 K/UL — SIGNIFICANT CHANGE UP (ref 150–400)
POTASSIUM SERPL-MCNC: 5 MMOL/L — SIGNIFICANT CHANGE UP (ref 3.5–5.3)
POTASSIUM SERPL-MCNC: 5.5 MMOL/L — HIGH (ref 3.5–5.3)
POTASSIUM SERPL-MCNC: 6.2 MMOL/L — CRITICAL HIGH (ref 3.5–5.3)
POTASSIUM SERPL-SCNC: 5 MMOL/L — SIGNIFICANT CHANGE UP (ref 3.5–5.3)
POTASSIUM SERPL-SCNC: 5.5 MMOL/L — HIGH (ref 3.5–5.3)
POTASSIUM SERPL-SCNC: 6.2 MMOL/L — CRITICAL HIGH (ref 3.5–5.3)
RBC # BLD: 4.32 M/UL — SIGNIFICANT CHANGE UP (ref 4.2–5.8)
RBC # FLD: 12.6 % — SIGNIFICANT CHANGE UP (ref 10.3–14.5)
SODIUM SERPL-SCNC: 129 MMOL/L — LOW (ref 135–145)
SODIUM SERPL-SCNC: 132 MMOL/L — LOW (ref 135–145)
SODIUM SERPL-SCNC: 132 MMOL/L — LOW (ref 135–145)
WBC # BLD: 16 K/UL — HIGH (ref 3.8–10.5)
WBC # FLD AUTO: 16 K/UL — HIGH (ref 3.8–10.5)

## 2022-05-12 PROCEDURE — 99233 SBSQ HOSP IP/OBS HIGH 50: CPT

## 2022-05-12 PROCEDURE — 71045 X-RAY EXAM CHEST 1 VIEW: CPT | Mod: 26

## 2022-05-12 RX ORDER — ACETAMINOPHEN 500 MG
750 TABLET ORAL ONCE
Refills: 0 | Status: DISCONTINUED | OUTPATIENT
Start: 2022-05-12 | End: 2022-05-13

## 2022-05-12 RX ORDER — SODIUM ZIRCONIUM CYCLOSILICATE 10 G/10G
10 POWDER, FOR SUSPENSION ORAL ONCE
Refills: 0 | Status: COMPLETED | OUTPATIENT
Start: 2022-05-12 | End: 2022-05-12

## 2022-05-12 RX ORDER — INSULIN LISPRO 100/ML
VIAL (ML) SUBCUTANEOUS AT BEDTIME
Refills: 0 | Status: DISCONTINUED | OUTPATIENT
Start: 2022-05-12 | End: 2022-05-13

## 2022-05-12 RX ORDER — ALBUMIN HUMAN 25 %
250 VIAL (ML) INTRAVENOUS ONCE
Refills: 0 | Status: DISCONTINUED | OUTPATIENT
Start: 2022-05-12 | End: 2022-05-12

## 2022-05-12 RX ORDER — OXYCODONE HYDROCHLORIDE 5 MG/1
5 TABLET ORAL
Refills: 0 | Status: DISCONTINUED | OUTPATIENT
Start: 2022-05-12 | End: 2022-05-13

## 2022-05-12 RX ORDER — CALCIUM GLUCONATE 100 MG/ML
2 VIAL (ML) INTRAVENOUS ONCE
Refills: 0 | Status: COMPLETED | OUTPATIENT
Start: 2022-05-12 | End: 2022-05-12

## 2022-05-12 RX ORDER — INSULIN HUMAN 100 [IU]/ML
10 INJECTION, SOLUTION SUBCUTANEOUS ONCE
Refills: 0 | Status: COMPLETED | OUTPATIENT
Start: 2022-05-12 | End: 2022-05-12

## 2022-05-12 RX ORDER — ALBUMIN HUMAN 25 %
250 VIAL (ML) INTRAVENOUS ONCE
Refills: 0 | Status: COMPLETED | OUTPATIENT
Start: 2022-05-12 | End: 2022-05-12

## 2022-05-12 RX ORDER — INSULIN LISPRO 100/ML
VIAL (ML) SUBCUTANEOUS
Refills: 0 | Status: DISCONTINUED | OUTPATIENT
Start: 2022-05-12 | End: 2022-05-13

## 2022-05-12 RX ORDER — ACETAMINOPHEN 500 MG
650 TABLET ORAL EVERY 6 HOURS
Refills: 0 | Status: DISCONTINUED | OUTPATIENT
Start: 2022-05-12 | End: 2022-05-13

## 2022-05-12 RX ORDER — ALBUMIN HUMAN 25 %
500 VIAL (ML) INTRAVENOUS ONCE
Refills: 0 | Status: DISCONTINUED | OUTPATIENT
Start: 2022-05-12 | End: 2022-05-12

## 2022-05-12 RX ORDER — TAMSULOSIN HYDROCHLORIDE 0.4 MG/1
0.4 CAPSULE ORAL AT BEDTIME
Refills: 0 | Status: DISCONTINUED | OUTPATIENT
Start: 2022-05-12 | End: 2022-05-13

## 2022-05-12 RX ORDER — AMLODIPINE BESYLATE 2.5 MG/1
5 TABLET ORAL DAILY
Refills: 0 | Status: DISCONTINUED | OUTPATIENT
Start: 2022-05-12 | End: 2022-05-13

## 2022-05-12 RX ORDER — INSULIN GLARGINE 100 [IU]/ML
20 INJECTION, SOLUTION SUBCUTANEOUS EVERY MORNING
Refills: 0 | Status: DISCONTINUED | OUTPATIENT
Start: 2022-05-12 | End: 2022-05-13

## 2022-05-12 RX ADMIN — Medication 650 MILLIGRAM(S): at 11:50

## 2022-05-12 RX ADMIN — Medication 650 MILLIGRAM(S): at 18:27

## 2022-05-12 RX ADMIN — MONTELUKAST 10 MILLIGRAM(S): 4 TABLET, CHEWABLE ORAL at 21:23

## 2022-05-12 RX ADMIN — Medication 81 MILLIGRAM(S): at 12:23

## 2022-05-12 RX ADMIN — AMLODIPINE BESYLATE 5 MILLIGRAM(S): 2.5 TABLET ORAL at 15:40

## 2022-05-12 RX ADMIN — INSULIN HUMAN 10 UNIT(S): 100 INJECTION, SOLUTION SUBCUTANEOUS at 09:24

## 2022-05-12 RX ADMIN — Medication 650 MILLIGRAM(S): at 23:10

## 2022-05-12 RX ADMIN — Medication 4: at 11:51

## 2022-05-12 RX ADMIN — INSULIN GLARGINE 20 UNIT(S): 100 INJECTION, SOLUTION SUBCUTANEOUS at 09:11

## 2022-05-12 RX ADMIN — Medication 25 MICROGRAM(S): at 06:03

## 2022-05-12 RX ADMIN — TAMSULOSIN HYDROCHLORIDE 0.4 MILLIGRAM(S): 0.4 CAPSULE ORAL at 21:23

## 2022-05-12 RX ADMIN — Medication 125 MILLILITER(S): at 15:40

## 2022-05-12 RX ADMIN — ATORVASTATIN CALCIUM 10 MILLIGRAM(S): 80 TABLET, FILM COATED ORAL at 21:23

## 2022-05-12 RX ADMIN — Medication 2: at 15:52

## 2022-05-12 RX ADMIN — ONDANSETRON 4 MILLIGRAM(S): 8 TABLET, FILM COATED ORAL at 11:00

## 2022-05-12 RX ADMIN — Medication 250 MILLILITER(S): at 09:12

## 2022-05-12 RX ADMIN — Medication 650 MILLIGRAM(S): at 12:23

## 2022-05-12 RX ADMIN — Medication 250 MILLILITER(S): at 11:40

## 2022-05-12 RX ADMIN — Medication 650 MILLIGRAM(S): at 23:37

## 2022-05-12 RX ADMIN — HYDROMORPHONE HYDROCHLORIDE 0.5 MILLIGRAM(S): 2 INJECTION INTRAMUSCULAR; INTRAVENOUS; SUBCUTANEOUS at 07:18

## 2022-05-12 RX ADMIN — SODIUM ZIRCONIUM CYCLOSILICATE 10 GRAM(S): 10 POWDER, FOR SUSPENSION ORAL at 10:09

## 2022-05-12 RX ADMIN — Medication 200 GRAM(S): at 10:10

## 2022-05-12 RX ADMIN — HEPARIN SODIUM 2500 UNIT(S): 5000 INJECTION INTRAVENOUS; SUBCUTANEOUS at 18:27

## 2022-05-12 RX ADMIN — HEPARIN SODIUM 2500 UNIT(S): 5000 INJECTION INTRAVENOUS; SUBCUTANEOUS at 06:03

## 2022-05-12 NOTE — PROGRESS NOTE ADULT - SUBJECTIVE AND OBJECTIVE BOX
Anesthesia Post Operative Note    s/p day 1 of procedure: L VATS wedge resection    71y Male POSTOP DAY 1 S/P L VATS wedge resection    Vital Signs Last 24 Hrs  T(C): 36 (12 May 2022 12:00), Max: 37.3 (11 May 2022 16:00)  T(F): 96.8 (12 May 2022 12:00), Max: 99.1 (11 May 2022 16:00)  HR: 93 (12 May 2022 12:00) (82 - 105)  BP: 159/71 (12 May 2022 12:00) (101/57 - 159/71)  BP(mean): 96 (12 May 2022 12:00) (68 - 96)  RR: 15 (12 May 2022 12:00) (10 - 24)  SpO2: 98% (12 May 2022 12:00) (94% - 100%)    Patient seen at: bedside    Doing well, no anesthetic complications or complaints noted or reported.  Pain is controlled.

## 2022-05-12 NOTE — PROGRESS NOTE ADULT - SUBJECTIVE AND OBJECTIVE BOX
CHIEF COMPLAINT: FOLLOW UP IN ICU FOR POSTOPERATIVE CARE OF PATIENT WHO IS S/P LUNG RESECTION      PROCEDURES: Robo LVATS, pleural biopsy, LLL wedge resection. 11-May-2022      ISSUES:   LARRY  Hyperkalemia  Lung nodule  Post op pain  Chest tube in place  Hx of parotid gland tumor  S/p pacemaker  CAD s/p stent (2011) on aspirin  DM2  GERD  HTN  HLD  Gout    INTERVAL EVENTS:   Chest tube with no airleak.      HISTORY:   Patient reports moderate pain at chest wall incision sites which is worse with coughing and deep breathing without associated fever or dyspnea. Pain is improved with use of pain meds.     PHYSICAL EXAM:   Gen: Comfortable, No acute distress  Eyes: Sclera white, Conjunctiva normal, Eyelids normal, Pupils symmetrical   ENT: Mucous membranes moist,  ,  ,    Neck: Trachea midline,  ,  ,  ,  ,  ,    CV: Rate regular, Rhythm regular,  ,  ,    Resp: Breath sounds clear, No accessory muscles use, L chest tube in place,  ,    Abd: Soft, Non-distended, Non-tender, Bowel sounds normal,  ,  ,    Skin: Warm, No peripheral edema of lower extremities,  ,    : No girard  Neuro: Moving all 4 extremities,  facial droop present  Psych: A&Ox3      ASSESSMENT AND PLAN:     NEURO:  Post-operative Pain - Stable. Pain control with oxycodone PO          RESPIRATORY:  Stable on room air - Incentive spirometry. Chest PT and suctioning of secretions. Out of bed to chair and ambulate with assistance. Continuous pulse oximetry for support & to prevent decompensation.       Discontinue chest tube       Asthma - worsened by thoracic surgery. Continue albuterol and montelukast.            CARDIOVASCULAR:  Hemodynamically stable - Not on pressors. Continue hemodynamic monitoring.  Telemetry (medical test) - Reviewed by me today independently. Normal sinus rhythm.  HTN - stable. Continue home antihypertensives medications.  CAD - stable. Continue aspirin.       RENAL:  LARRY – Improving. Renally dose medications. Monitor for hyperkalemia and uremia. Avoid nephrotoxic medications. Monitor IOs and electrolytes.    Hyperkalemia - s/p Lokelma, IVF bolus, insulin IV. Improved. Repeat BMP. IVF hydration.     GASTROINTESTINAL:  GI prophylaxis not indicated  Zofran and Reglan IV PRN for nausea  Regular consistency diet         HEMATOLOGIC:  No signs of active bleeding. Monitor Hgb in CBC in AM  DVT prophylaxis with heparin subQ and SCDs.               INFECTIOUS DISEASE:  All surgical sites appear clean. Will monitor for fever and leukocytosis.       ENDOCRINE:  DM2 – Stable. Monitor glucose fingersticks for goal 120-180. Insulin sliding scale. Carb control diet.  Hypothyroidism - stable. Continue Synthroid.            ONCOLOGY:  Lung nodule - Improved. S/P resection. Follow up final pathology.    Hx of parotid gland tumor - follow up final pathology from lung resection.     Pertinent clinical, laboratory, radiographic, hemodynamic, echocardiographic, respiratory data, microbiologic data and chart were reviewed by myself and analyzed frequently throughout the course of the day and night by myself.    Plan discussed at length with the CTICU staff and Attending CT Surgeon -   Dr Shar Kim.      Patient's status was discussed with patient at bedside.     	      ________________________________________________      _________________________  VITAL SIGNS:  Vital Signs Last 24 Hrs  T(C): 36 (12 May 2022 12:00), Max: 36.9 (12 May 2022 08:00)  T(F): 96.8 (12 May 2022 12:00), Max: 98.4 (12 May 2022 08:00)  HR: 90 (12 May 2022 15:00) (82 - 105)  BP: 168/79 (12 May 2022 15:00) (101/57 - 168/79)  BP(mean): 103 (12 May 2022 15:00) (68 - 103)  RR: 15 (12 May 2022 12:00) (10 - 24)  SpO2: 98% (12 May 2022 15:00) (94% - 100%)  I/Os:   I&O's Detail    11 May 2022 07:01  -  12 May 2022 07:00  --------------------------------------------------------  IN:    Lactated Ringers: 360 mL    Oral Fluid: 480 mL  Total IN: 840 mL    OUT:    Chest Tube (mL): 35 mL    Voided (mL): 400 mL  Total OUT: 435 mL    Total NET: 405 mL      12 May 2022 07:01  -  12 May 2022 16:41  --------------------------------------------------------  IN:    IV PiggyBack: 600 mL    Lactated Ringers: 150 mL  Total IN: 750 mL    OUT:    Chest Tube (mL): 0 mL    Voided (mL): 250 mL  Total OUT: 250 mL    Total NET: 500 mL              MEDICATIONS:  MEDICATIONS  (STANDING):  acetaminophen     Tablet .. 650 milliGRAM(s) Oral every 6 hours  amLODIPine   Tablet 5 milliGRAM(s) Oral daily  aspirin enteric coated 81 milliGRAM(s) Oral daily  atorvastatin 10 milliGRAM(s) Oral at bedtime  dextrose 50% Injectable 25 Gram(s) IV Push once  heparin   Injectable 2500 Unit(s) SubCutaneous every 12 hours  insulin glargine Injectable (LANTUS) 20 Unit(s) SubCutaneous every morning  insulin lispro (ADMELOG) corrective regimen sliding scale   SubCutaneous at bedtime  insulin lispro (ADMELOG) corrective regimen sliding scale   SubCutaneous three times a day before meals  insulin lispro (ADMELOG) corrective regimen sliding scale   SubCutaneous at bedtime  levothyroxine 25 MICROGram(s) Oral daily  montelukast 10 milliGRAM(s) Oral at bedtime  tamsulosin 0.4 milliGRAM(s) Oral at bedtime    MEDICATIONS  (PRN):  ALBUTerol    90 MICROgram(s) HFA Inhaler 2 Puff(s) Inhalation every 6 hours PRN Shortness of Breath and/or Wheezing  naloxone Injectable 0.1 milliGRAM(s) IV Push every 3 minutes PRN For ANY of the following changes in patient status:  A. RR LESS THAN 10 breaths per minute, B. Oxygen saturation LESS THAN 90%, C. Sedation score of 6  ondansetron Injectable 4 milliGRAM(s) IV Push every 6 hours PRN Nausea  oxyCODONE    IR 5 milliGRAM(s) Oral every 3 hours PRN Moderate and Severe Pain (4- 10)      LABS:  Laboratory data was independently reviewed by me today.                           13.5   16.00 )-----------( 184      ( 12 May 2022 05:00 )             39.3     05-12    132<L>  |  92<L>  |  39<H>  ----------------------------<  184<H>  5.0   |  25  |  1.33<H>    Ca    10.7<H>      12 May 2022 13:56  Phos  4.0     05-12  Mg     2.00     05-12                RADIOLOGY:   Radiology images were independently reviewed by me today. Reports were reviewed by me today.    Xray Chest 1 View- PORTABLE-Urgent:   ACC: [id number] EXAM:  XR CHEST PORTABLE URGENT 1V                          PROCEDURE DATE:  05/12/2022          INTERPRETATION:  TIME OF EXAM: May 12, 2022 at 11:08 AM    CLINICAL INFORMATION: Chest tube removal.    TECHNIQUE:   Portable chest    INTERPRETATION:    Left-sided chest tube has been removed. Lungs are clear aside from linear   atelectasis in the right midlung field. Small amount of subcutaneous   emphysema in the left chest. Tiny apical pneumothorax on the left.      COMPARISON:  May 12 at 4:49 AM      IMPRESSION:  Follow-up study post chest tube removal.    --- End of Report ---            JAX MCNAMARA MD; Attending Radiologist  This document has been electronically signed. May 12 2022 11:15AM (05-12-22 @ 11:08)  Xray Chest 1 View- PORTABLE-Routine:   ACC: [id number] EXAM:  XR CHEST PORTABLE URGENT 1V                        ACC: [id number] EXAM:  XR CHEST PORTABLE ROUTINE 1V                          PROCEDURE DATE:  05/12/2022          INTERPRETATION:   TIME OF EXAM: May 11, 2022 at 2:50 PM and May 12 at   4:49 AM    CLINICAL INFORMATION: Follow-up post pleural biopsy and wedge resection.    TECHNIQUE:   Portable chest    INTERPRETATION:    May 11 at 2:50 PM:  Left-sided chest tube is present. Linear atelectasis at the left lung   base and faintopacity in the right midlung field probably postop   atelectasis. No effusions or pneumothorax. Graft left-sided dual-lead   pacemaker in position.    May 12 at 4:49 AM:  Chest tube remains in place. Bilateral streaky areas of postop   atelectasis again seen. No effusions or pneumothorax.      COMPARISON:  CT chest April 2, 2022      IMPRESSION:  Status post left thoracotomy with chest tube and postop   streaky and linear atelectasis.            --- End of Report ---            JAX MCNAMARA MD; Attending Radiologist  This document has been electronically signed. May 12 2022 10:54AM (05-12-22 @ 05:07)  Xray Chest 1 View- PORTABLE-Urgent:   ACC: [id number] EXAM:  XR CHEST PORTABLE URGENT 1V                        ACC: [id number] EXAM:  XR CHEST PORTABLE ROUTINE 1V                          PROCEDURE DATE:  05/12/2022          INTERPRETATION:   TIME OF EXAM: May 11, 2022 at 2:50 PM and May 12 at   4:49 AM    CLINICAL INFORMATION: Follow-up post pleural biopsy and wedge resection.    TECHNIQUE:   Portable chest    INTERPRETATION:    May 11 at 2:50 PM:  Left-sided chest tube is present. Linear atelectasis at the left lung   base and faintopacity in the right midlung field probably postop   atelectasis. No effusions or pneumothorax. Graft left-sided dual-lead   pacemaker in position.    May 12 at 4:49 AM:  Chest tube remains in place. Bilateral streaky areas of postop   atelectasis again seen. No effusions or pneumothorax.      COMPARISON:  CT chest April 2, 2022      IMPRESSION:  Status post left thoracotomy with chest tube and postop   streaky and linear atelectasis.            --- End of Report ---            JAX MCNAMARA MD; Attending Radiologist  This document has been electronically signed. May 12 2022 10:54AM (05-11-22 @ 15:36)       CHIEF COMPLAINT: FOLLOW UP IN ICU FOR POSTOPERATIVE CARE OF PATIENT WHO IS S/P LUNG RESECTION      PROCEDURES: Robo LVATS, pleural biopsy, LLL wedge resection. 11-May-2022      ISSUES:   LARRY  Hyperkalemia  Lung nodule  Post op pain  Chest tube in place  Hx of parotid gland tumor  S/p pacemaker  CAD s/p stent (2011) on aspirin  DM2  GERD  HTN  HLD  Gout  Asthma    INTERVAL EVENTS:   Chest tube with no airleak.      HISTORY:   Patient reports moderate pain at chest wall incision sites which is worse with coughing and deep breathing without associated fever or dyspnea. Pain is improved with use of pain meds.     PHYSICAL EXAM:   Gen: Comfortable, No acute distress  Eyes: Sclera white, Conjunctiva normal, Eyelids normal, Pupils symmetrical   ENT: Mucous membranes moist,  ,  ,    Neck: Trachea midline,  ,  ,  ,  ,  ,    CV: Rate regular, Rhythm regular,  ,  ,    Resp: Breath sounds clear, No accessory muscles use, L chest tube in place,  ,    Abd: Soft, Non-distended, Non-tender, Bowel sounds normal,  ,  ,    Skin: Warm, No peripheral edema of lower extremities,  ,    : No girard  Neuro: Moving all 4 extremities,  facial droop present  Psych: A&Ox3      ASSESSMENT AND PLAN:     NEURO:  Post-operative Pain - Stable. Pain control with oxycodone PO          RESPIRATORY:  Stable on room air - Incentive spirometry. Chest PT and suctioning of secretions. Out of bed to chair and ambulate with assistance. Continuous pulse oximetry for support & to prevent decompensation.       Discontinue chest tube       Asthma - worsened by thoracic surgery. Continue albuterol and montelukast.            CARDIOVASCULAR:  Hemodynamically stable - Not on pressors. Continue hemodynamic monitoring.  Telemetry (medical test) - Reviewed by me today independently. Normal sinus rhythm.  HTN - stable. Continue home antihypertensives medications.  CAD - stable. Continue aspirin.       RENAL:  LARRY – Improving. Renally dose medications. Monitor for hyperkalemia and uremia. Avoid nephrotoxic medications. Monitor IOs and electrolytes.    Hyperkalemia - s/p Lokelma, IVF bolus, insulin IV. Improved. Repeat BMP. IVF hydration.     GASTROINTESTINAL:  GI prophylaxis not indicated  Zofran and Reglan IV PRN for nausea  Regular consistency diet         HEMATOLOGIC:  No signs of active bleeding. Monitor Hgb in CBC in AM  DVT prophylaxis with heparin subQ and SCDs.               INFECTIOUS DISEASE:  All surgical sites appear clean. Will monitor for fever and leukocytosis.       ENDOCRINE:  DM2 – Stable. Monitor glucose fingersticks for goal 120-180. Insulin sliding scale. Carb control diet.  Hypothyroidism - stable. Continue Synthroid.            ONCOLOGY:  Lung nodule - Improved. S/P resection. Follow up final pathology.    Hx of parotid gland tumor - follow up final pathology from lung resection.     Pertinent clinical, laboratory, radiographic, hemodynamic, echocardiographic, respiratory data, microbiologic data and chart were reviewed by myself and analyzed frequently throughout the course of the day and night by myself.    Plan discussed at length with the CTICU staff and Attending CT Surgeon -   Dr Shar Kim.      Patient's status was discussed with patient at bedside.     	      ________________________________________________      _________________________  VITAL SIGNS:  Vital Signs Last 24 Hrs  T(C): 36 (12 May 2022 12:00), Max: 36.9 (12 May 2022 08:00)  T(F): 96.8 (12 May 2022 12:00), Max: 98.4 (12 May 2022 08:00)  HR: 90 (12 May 2022 15:00) (82 - 105)  BP: 168/79 (12 May 2022 15:00) (101/57 - 168/79)  BP(mean): 103 (12 May 2022 15:00) (68 - 103)  RR: 15 (12 May 2022 12:00) (10 - 24)  SpO2: 98% (12 May 2022 15:00) (94% - 100%)  I/Os:   I&O's Detail    11 May 2022 07:01  -  12 May 2022 07:00  --------------------------------------------------------  IN:    Lactated Ringers: 360 mL    Oral Fluid: 480 mL  Total IN: 840 mL    OUT:    Chest Tube (mL): 35 mL    Voided (mL): 400 mL  Total OUT: 435 mL    Total NET: 405 mL      12 May 2022 07:01  -  12 May 2022 16:41  --------------------------------------------------------  IN:    IV PiggyBack: 600 mL    Lactated Ringers: 150 mL  Total IN: 750 mL    OUT:    Chest Tube (mL): 0 mL    Voided (mL): 250 mL  Total OUT: 250 mL    Total NET: 500 mL              MEDICATIONS:  MEDICATIONS  (STANDING):  acetaminophen     Tablet .. 650 milliGRAM(s) Oral every 6 hours  amLODIPine   Tablet 5 milliGRAM(s) Oral daily  aspirin enteric coated 81 milliGRAM(s) Oral daily  atorvastatin 10 milliGRAM(s) Oral at bedtime  dextrose 50% Injectable 25 Gram(s) IV Push once  heparin   Injectable 2500 Unit(s) SubCutaneous every 12 hours  insulin glargine Injectable (LANTUS) 20 Unit(s) SubCutaneous every morning  insulin lispro (ADMELOG) corrective regimen sliding scale   SubCutaneous at bedtime  insulin lispro (ADMELOG) corrective regimen sliding scale   SubCutaneous three times a day before meals  insulin lispro (ADMELOG) corrective regimen sliding scale   SubCutaneous at bedtime  levothyroxine 25 MICROGram(s) Oral daily  montelukast 10 milliGRAM(s) Oral at bedtime  tamsulosin 0.4 milliGRAM(s) Oral at bedtime    MEDICATIONS  (PRN):  ALBUTerol    90 MICROgram(s) HFA Inhaler 2 Puff(s) Inhalation every 6 hours PRN Shortness of Breath and/or Wheezing  naloxone Injectable 0.1 milliGRAM(s) IV Push every 3 minutes PRN For ANY of the following changes in patient status:  A. RR LESS THAN 10 breaths per minute, B. Oxygen saturation LESS THAN 90%, C. Sedation score of 6  ondansetron Injectable 4 milliGRAM(s) IV Push every 6 hours PRN Nausea  oxyCODONE    IR 5 milliGRAM(s) Oral every 3 hours PRN Moderate and Severe Pain (4- 10)      LABS:  Laboratory data was independently reviewed by me today.                           13.5   16.00 )-----------( 184      ( 12 May 2022 05:00 )             39.3     05-12    132<L>  |  92<L>  |  39<H>  ----------------------------<  184<H>  5.0   |  25  |  1.33<H>    Ca    10.7<H>      12 May 2022 13:56  Phos  4.0     05-12  Mg     2.00     05-12                RADIOLOGY:   Radiology images were independently reviewed by me today. Reports were reviewed by me today.    Xray Chest 1 View- PORTABLE-Urgent:   ACC: [id number] EXAM:  XR CHEST PORTABLE URGENT 1V                          PROCEDURE DATE:  05/12/2022          INTERPRETATION:  TIME OF EXAM: May 12, 2022 at 11:08 AM    CLINICAL INFORMATION: Chest tube removal.    TECHNIQUE:   Portable chest    INTERPRETATION:    Left-sided chest tube has been removed. Lungs are clear aside from linear   atelectasis in the right midlung field. Small amount of subcutaneous   emphysema in the left chest. Tiny apical pneumothorax on the left.      COMPARISON:  May 12 at 4:49 AM      IMPRESSION:  Follow-up study post chest tube removal.    --- End of Report ---            JAX MCNAMARA MD; Attending Radiologist  This document has been electronically signed. May 12 2022 11:15AM (05-12-22 @ 11:08)  Xray Chest 1 View- PORTABLE-Routine:   ACC: [id number] EXAM:  XR CHEST PORTABLE URGENT 1V                        ACC: [id number] EXAM:  XR CHEST PORTABLE ROUTINE 1V                          PROCEDURE DATE:  05/12/2022          INTERPRETATION:   TIME OF EXAM: May 11, 2022 at 2:50 PM and May 12 at   4:49 AM    CLINICAL INFORMATION: Follow-up post pleural biopsy and wedge resection.    TECHNIQUE:   Portable chest    INTERPRETATION:    May 11 at 2:50 PM:  Left-sided chest tube is present. Linear atelectasis at the left lung   base and faintopacity in the right midlung field probably postop   atelectasis. No effusions or pneumothorax. Graft left-sided dual-lead   pacemaker in position.    May 12 at 4:49 AM:  Chest tube remains in place. Bilateral streaky areas of postop   atelectasis again seen. No effusions or pneumothorax.      COMPARISON:  CT chest April 2, 2022      IMPRESSION:  Status post left thoracotomy with chest tube and postop   streaky and linear atelectasis.            --- End of Report ---            JAX MCNAMARA MD; Attending Radiologist  This document has been electronically signed. May 12 2022 10:54AM (05-12-22 @ 05:07)  Xray Chest 1 View- PORTABLE-Urgent:   ACC: [id number] EXAM:  XR CHEST PORTABLE URGENT 1V                        ACC: [id number] EXAM:  XR CHEST PORTABLE ROUTINE 1V                          PROCEDURE DATE:  05/12/2022          INTERPRETATION:   TIME OF EXAM: May 11, 2022 at 2:50 PM and May 12 at   4:49 AM    CLINICAL INFORMATION: Follow-up post pleural biopsy and wedge resection.    TECHNIQUE:   Portable chest    INTERPRETATION:    May 11 at 2:50 PM:  Left-sided chest tube is present. Linear atelectasis at the left lung   base and faintopacity in the right midlung field probably postop   atelectasis. No effusions or pneumothorax. Graft left-sided dual-lead   pacemaker in position.    May 12 at 4:49 AM:  Chest tube remains in place. Bilateral streaky areas of postop   atelectasis again seen. No effusions or pneumothorax.      COMPARISON:  CT chest April 2, 2022      IMPRESSION:  Status post left thoracotomy with chest tube and postop   streaky and linear atelectasis.            --- End of Report ---            JAX MCNAMARA MD; Attending Radiologist  This document has been electronically signed. May 12 2022 10:54AM (05-11-22 @ 15:36)

## 2022-05-12 NOTE — PROGRESS NOTE ADULT - SUBJECTIVE AND OBJECTIVE BOX
Anesthesia Pain Management Service    SUBJECTIVE: Patient is doing well with IV PCA and no significant problems reported.    Pain Scale Score	At rest: _3/10__ 	With Activity: ___ 	[X ] Refer to charted pain scores    THERAPY:    [ ] IV PCA Morphine		[ ] 5 mg/mL	[ ] 1 mg/mL  [X ] IV PCA Hydromorphone	[ ] 5 mg/mL	[X ] 1 mg/mL  [ ] IV PCA Fentanyl		[ ] 50 micrograms/mL    Demand dose __0.2_ lockout __6_ (minutes) Continuous Rate _0__ Total: __3.8_   mg used (in past 24 hrs)      MEDICATIONS  (STANDING):  acetaminophen     Tablet .. 650 milliGRAM(s) Oral every 6 hours  albumin human  5% IVPB 250 milliLiter(s) IV Intermittent once  aspirin enteric coated 81 milliGRAM(s) Oral daily  atorvastatin 10 milliGRAM(s) Oral at bedtime  calcium gluconate IVPB 2 Gram(s) IV Intermittent once  dextrose 50% Injectable 25 Gram(s) IV Push once  heparin   Injectable 2500 Unit(s) SubCutaneous every 12 hours  insulin glargine Injectable (LANTUS) 20 Unit(s) SubCutaneous every morning  insulin lispro (ADMELOG) corrective regimen sliding scale   SubCutaneous at bedtime  insulin lispro (ADMELOG) corrective regimen sliding scale   SubCutaneous three times a day before meals  insulin lispro (ADMELOG) corrective regimen sliding scale   SubCutaneous at bedtime  lactated ringers. 1000 milliLiter(s) (30 mL/Hr) IV Continuous <Continuous>  levothyroxine 25 MICROGram(s) Oral daily  montelukast 10 milliGRAM(s) Oral at bedtime  sodium zirconium cyclosilicate 10 Gram(s) Oral once    MEDICATIONS  (PRN):  ALBUTerol    90 MICROgram(s) HFA Inhaler 2 Puff(s) Inhalation every 6 hours PRN Shortness of Breath and/or Wheezing  naloxone Injectable 0.1 milliGRAM(s) IV Push every 3 minutes PRN For ANY of the following changes in patient status:  A. RR LESS THAN 10 breaths per minute, B. Oxygen saturation LESS THAN 90%, C. Sedation score of 6  ondansetron Injectable 4 milliGRAM(s) IV Push every 6 hours PRN Nausea  oxyCODONE    IR 5 milliGRAM(s) Oral every 3 hours PRN Moderate and Severe Pain (4- 10)      OBJECTIVE: Patient sitting up in chair. CTX1 in place.    Sedation Score:	[ X] Alert	[ ] Drowsy 	[ ] Arousable	[ ] Asleep	[ ] Unresponsive    Side Effects:	[X ] None	[ ] Nausea	[ ] Vomiting	[ ] Pruritus  		[ ] Other:    Vital Signs Last 24 Hrs  T(C): 36.9 (12 May 2022 08:00), Max: 37.3 (11 May 2022 16:00)  T(F): 98.4 (12 May 2022 08:00), Max: 99.1 (11 May 2022 16:00)  HR: 92 (12 May 2022 09:00) (70 - 105)  BP: 146/62 (12 May 2022 09:00) (101/57 - 159/67)  BP(mean): 85 (12 May 2022 09:00) (68 - 96)  RR: 16 (12 May 2022 09:00) (10 - 24)  SpO2: 100% (12 May 2022 09:00) (94% - 100%)    ASSESSMENT/ PLAN    Therapy to  be:	[ ] Continue   [ X] Discontinued   [X ] Change to prn Analgesics    Documentation and Verification of current medications:   [X] Done	[ ] Not done, not elligible    Comments: Discussed patient with primary team. IV PCA discontinued. PRN Oral/IV opioids and/or Adjuvant non-opioid medication to be ordered at this point.    Progress Note written now but Patient was seen earlier.

## 2022-05-12 NOTE — PROGRESS NOTE ADULT - SUBJECTIVE AND OBJECTIVE BOX
Anesthesia Pain Management Service- Attending Addendum    SUBJECTIVE: Patient's pain control adequate    Therapy:	  [ X] IV PCA	   [ ] Epidural           [ ] s/p Spinal Opoid              [ ] Postpartum infusion	  [ ] Patient controlled regional anesthesia (PCRA)    [ ] prn Analgesics    Allergies    No Known Allergies    Intolerances      MEDICATIONS  (STANDING):  acetaminophen     Tablet .. 650 milliGRAM(s) Oral every 6 hours  aspirin enteric coated 81 milliGRAM(s) Oral daily  atorvastatin 10 milliGRAM(s) Oral at bedtime  dextrose 50% Injectable 25 Gram(s) IV Push once  heparin   Injectable 2500 Unit(s) SubCutaneous every 12 hours  insulin glargine Injectable (LANTUS) 20 Unit(s) SubCutaneous every morning  insulin lispro (ADMELOG) corrective regimen sliding scale   SubCutaneous at bedtime  insulin lispro (ADMELOG) corrective regimen sliding scale   SubCutaneous three times a day before meals  insulin lispro (ADMELOG) corrective regimen sliding scale   SubCutaneous at bedtime  levothyroxine 25 MICROGram(s) Oral daily  montelukast 10 milliGRAM(s) Oral at bedtime    MEDICATIONS  (PRN):  ALBUTerol    90 MICROgram(s) HFA Inhaler 2 Puff(s) Inhalation every 6 hours PRN Shortness of Breath and/or Wheezing  naloxone Injectable 0.1 milliGRAM(s) IV Push every 3 minutes PRN For ANY of the following changes in patient status:  A. RR LESS THAN 10 breaths per minute, B. Oxygen saturation LESS THAN 90%, C. Sedation score of 6  ondansetron Injectable 4 milliGRAM(s) IV Push every 6 hours PRN Nausea  oxyCODONE    IR 5 milliGRAM(s) Oral every 3 hours PRN Moderate and Severe Pain (4- 10)      OBJECTIVE:   [X] No new signs     [ ] Other:    Side Effects:  [X ] None			[ ] Other:      ASSESSMENT/PLAN  -Discontinue current therapy    [ ] Therapy changed to:    [ ] IV PCA       [ ] Epidural     [ X] prn Analgesics     Comments: Pain management per primary team, APS to sign off    Note entered after patient seen

## 2022-05-13 ENCOUNTER — TRANSCRIPTION ENCOUNTER (OUTPATIENT)
Age: 72
End: 2022-05-13

## 2022-05-13 VITALS — WEIGHT: 115.96 LBS

## 2022-05-13 LAB
ANION GAP SERPL CALC-SCNC: 13 MMOL/L — SIGNIFICANT CHANGE UP (ref 7–14)
BUN SERPL-MCNC: 38 MG/DL — HIGH (ref 7–23)
CALCIUM SERPL-MCNC: 10.1 MG/DL — SIGNIFICANT CHANGE UP (ref 8.4–10.5)
CHLORIDE SERPL-SCNC: 94 MMOL/L — LOW (ref 98–107)
CO2 SERPL-SCNC: 24 MMOL/L — SIGNIFICANT CHANGE UP (ref 22–31)
CREAT SERPL-MCNC: 1.27 MG/DL — SIGNIFICANT CHANGE UP (ref 0.5–1.3)
EGFR: 60 ML/MIN/1.73M2 — SIGNIFICANT CHANGE UP
GLUCOSE BLDC GLUCOMTR-MCNC: 174 MG/DL — HIGH (ref 70–99)
GLUCOSE SERPL-MCNC: 174 MG/DL — HIGH (ref 70–99)
HCT VFR BLD CALC: 37.1 % — LOW (ref 39–50)
HGB BLD-MCNC: 12.4 G/DL — LOW (ref 13–17)
MAGNESIUM SERPL-MCNC: 2 MG/DL — SIGNIFICANT CHANGE UP (ref 1.6–2.6)
MCHC RBC-ENTMCNC: 30.5 PG — SIGNIFICANT CHANGE UP (ref 27–34)
MCHC RBC-ENTMCNC: 33.4 GM/DL — SIGNIFICANT CHANGE UP (ref 32–36)
MCV RBC AUTO: 91.2 FL — SIGNIFICANT CHANGE UP (ref 80–100)
NRBC # BLD: 0 /100 WBCS — SIGNIFICANT CHANGE UP
NRBC # FLD: 0 K/UL — SIGNIFICANT CHANGE UP
PHOSPHATE SERPL-MCNC: 3.2 MG/DL — SIGNIFICANT CHANGE UP (ref 2.5–4.5)
PLATELET # BLD AUTO: 160 K/UL — SIGNIFICANT CHANGE UP (ref 150–400)
POTASSIUM SERPL-MCNC: 5.1 MMOL/L — SIGNIFICANT CHANGE UP (ref 3.5–5.3)
POTASSIUM SERPL-SCNC: 5.1 MMOL/L — SIGNIFICANT CHANGE UP (ref 3.5–5.3)
RBC # BLD: 4.07 M/UL — LOW (ref 4.2–5.8)
RBC # FLD: 12.7 % — SIGNIFICANT CHANGE UP (ref 10.3–14.5)
SODIUM SERPL-SCNC: 131 MMOL/L — LOW (ref 135–145)
WBC # BLD: 13.23 K/UL — HIGH (ref 3.8–10.5)
WBC # FLD AUTO: 13.23 K/UL — HIGH (ref 3.8–10.5)

## 2022-05-13 PROCEDURE — 71045 X-RAY EXAM CHEST 1 VIEW: CPT | Mod: 26

## 2022-05-13 PROCEDURE — 99232 SBSQ HOSP IP/OBS MODERATE 35: CPT

## 2022-05-13 RX ORDER — OXYCODONE HYDROCHLORIDE 5 MG/1
1 TABLET ORAL
Qty: 25 | Refills: 0
Start: 2022-05-13 | End: 2022-05-17

## 2022-05-13 RX ORDER — ALBUMIN HUMAN 25 %
250 VIAL (ML) INTRAVENOUS ONCE
Refills: 0 | Status: DISCONTINUED | OUTPATIENT
Start: 2022-05-13 | End: 2022-05-13

## 2022-05-13 RX ORDER — ALBUMIN HUMAN 25 %
250 VIAL (ML) INTRAVENOUS ONCE
Refills: 0 | Status: COMPLETED | OUTPATIENT
Start: 2022-05-13 | End: 2022-05-13

## 2022-05-13 RX ORDER — TAMSULOSIN HYDROCHLORIDE 0.4 MG/1
1 CAPSULE ORAL
Qty: 30 | Refills: 0
Start: 2022-05-13 | End: 2022-06-11

## 2022-05-13 RX ADMIN — Medication 125 MILLILITER(S): at 06:13

## 2022-05-13 RX ADMIN — Medication 650 MILLIGRAM(S): at 06:00

## 2022-05-13 RX ADMIN — Medication 650 MILLIGRAM(S): at 05:09

## 2022-05-13 RX ADMIN — HEPARIN SODIUM 2500 UNIT(S): 5000 INJECTION INTRAVENOUS; SUBCUTANEOUS at 05:09

## 2022-05-13 RX ADMIN — AMLODIPINE BESYLATE 5 MILLIGRAM(S): 2.5 TABLET ORAL at 09:10

## 2022-05-13 RX ADMIN — Medication 25 MICROGRAM(S): at 05:09

## 2022-05-13 RX ADMIN — INSULIN GLARGINE 20 UNIT(S): 100 INJECTION, SOLUTION SUBCUTANEOUS at 07:47

## 2022-05-13 RX ADMIN — Medication 2: at 07:48

## 2022-05-13 NOTE — DISCHARGE NOTE PROVIDER - NSDCFUADDINST_GEN_ALL_CORE_FT
Please walk 4-5 x per day, Increase as tolerated. You may climb stairs. Continue to use incentive spirometer.   You may keep all wounds uncovered. Please shower daily. The suture will be removed in office at follow up apt.   See Dr. Kim's office in 2 weeks. Please call [phone] for an apt. Please get an CXR the day before your appointment and bring the CD with you to your follow up appointment.  Take pain pills only as needed. Please take a laxative to help support your bowel movements while on pain medication. Laxatives can be available over the counter at your local pharmacy.  Please call the office at [phone] if you have fever's chills, worsening shortness of breath, chest pain, warmth, redness or purulent discharge from the insertion site.

## 2022-05-13 NOTE — DISCHARGE NOTE NURSING/CASE MANAGEMENT/SOCIAL WORK - NSDCPEFALRISK_GEN_ALL_CORE
For information on Fall & Injury Prevention, visit: [email or web address] OR  [email or web address] OR  https://www.cdc.gov/steadi/patient.html

## 2022-05-13 NOTE — DISCHARGE NOTE PROVIDER - CARE PROVIDER_API CALL
Shar Kim (MD)  Surgery; Thoracic Surgery  [phone] [place] [address]  Phone: [phone]  Fax: [phone]  Follow Up Time:

## 2022-05-13 NOTE — PROGRESS NOTE ADULT - SUBJECTIVE AND OBJECTIVE BOX
LUIS BOOGIE      71y   Male   MRN-[id number]         No Known Allergies             Daily     Daily Drug Dosing Weight  Height (cm): 152.4 (11 May 2022 09:31)  Weight (kg): 52.6 (11 May 2022 09:31)  BMI (kg/m2): 22.6 (11 May 2022 09:31)  BSA (m2): 1.48 (11 May 2022 09:31)    HPI: Pt. is a 72 yo male that is unaware of what surgery he is having and who the Surgeon is.  Pt. is having lung surgery.    Procedure: FB, Robo LVATS, pleural biopsy, LLL wedge resection. 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   GERD  Gout  Tumor of right  parotid gland  Hypothyroidism  LARRY    Postop course:     Patient reports moderate pain at chest wall incision sites which is worse with coughing and deep breathing without associated fever or dyspnea. Pain is improved with use of PCA and  oral pain meds.         Home Medications:  amLODIPine 5 mg oral tablet: 1 tab(s) orally once a day (at bedtime) (11 May 2022 09:59)  aspirin 81 mg oral tablet: 1 tab(s) orally once a day PM (11 May 2022 09:59)  atorvastatin 10 mg oral tablet: 1 tab(s) orally once a day (at bedtime) (11 May 2022 09:59)  Lantus 100 units/mL subcutaneous solution: 20 unit(s) subcutaneous once a day AM (11 May 2022 09:59)  lisinopril 5 mg oral tablet: 1 tab(s) orally once a day AM (11 May 2022 09:59)  montelukast 10 mg oral tablet: 1 tab(s) orally once a day (at bedtime) (11 May 2022 09:59)  Synthroid 25 mcg (0.025 mg) oral tablet: 1 tab(s) orally once a day AM (11 May 2022 09:59)  Ventolin HFA 90 mcg/inh inhalation aerosol: 2 puff(s) inhaled every 6 hours, As Needed (11 May 2022 09:59)  vitamin B complex: 1 cap(s) orally once a day AM (11 May 2022 09:59)  Vitamin C: 1 tab(s) orally once a day PM (11 May 2022 09:59)    PAST MEDICAL & SURGICAL HISTORY:  HTN (hypertension)      HLD (hyperlipidemia)      Kidney disease      DM (diabetes mellitus)  x 16 years ; fs  4/09/19 ;      Gout      Hypertension, unspecified type      DM (diabetes mellitus)      Kidney disease      H/O gastroesophageal reflux (GERD)      Coronary artery disease  s/p angiogram with stent 2011      Cardiac pacemaker  2014      Tumor of parotid gland  right side      History of cholecystectomy  2009      H/O cardiac pacemaker  2014      Cataract  Excisiion right eye      Tumor of parotid gland  s/p resection        Vital Signs Last 24 Hrs  T(C): 37.1 (13 May 2022 08:00), Max: 37.3 (12 May 2022 20:00)  T(F): 98.7 (13 May 2022 08:00), Max: 99.1 (12 May 2022 20:00)  HR: 77 (13 May 2022 08:00) (71 - 97)  BP: 131/56 (13 May 2022 08:00) (103/83 - 168/79)  BP(mean): 78 (13 May 2022 08:00) (68 - 103)  RR: 15 (13 May 2022 08:00) (14 - 18)  SpO2: 95% (13 May 2022 08:00) (94% - 98%)  I&O's Detail    12 May 2022 07:01  -  13 May 2022 07:00  --------------------------------------------------------  IN:    IV PiggyBack: 600 mL    Lactated Ringers: 150 mL    Oral Fluid: 200 mL  Total IN: 950 mL    OUT:    Chest Tube (mL): 0 mL    Voided (mL): 1650 mL  Total OUT: 1650 mL    Total NET: -700 mL        CAPILLARY BLOOD GLUCOSE      POCT Blood Glucose.: 174 mg/dL (13 May 2022 07:41)  POCT Blood Glucose.: 154 mg/dL (12 May 2022 21:20)  POCT Blood Glucose.: 186 mg/dL (12 May 2022 15:45)  POCT Blood Glucose.: 206 mg/dL (12 May 2022 11:44)    Home Medications:  amLODIPine 5 mg oral tablet: 1 tab(s) orally once a day (at bedtime) (11 May 2022 09:59)  aspirin 81 mg oral tablet: 1 tab(s) orally once a day PM (11 May 2022 09:59)  atorvastatin 10 mg oral tablet: 1 tab(s) orally once a day (at bedtime) (11 May 2022 09:59)  Lantus 100 units/mL subcutaneous solution: 20 unit(s) subcutaneous once a day AM (11 May 2022 09:59)  lisinopril 5 mg oral tablet: 1 tab(s) orally once a day AM (11 May 2022 09:59)  montelukast 10 mg oral tablet: 1 tab(s) orally once a day (at bedtime) (11 May 2022 09:59)  Synthroid 25 mcg (0.025 mg) oral tablet: 1 tab(s) orally once a day AM (11 May 2022 09:59)  Ventolin HFA 90 mcg/inh inhalation aerosol: 2 puff(s) inhaled every 6 hours, As Needed (11 May 2022 09:59)  vitamin B complex: 1 cap(s) orally once a day AM (11 May 2022 09:59)  Vitamin C: 1 tab(s) orally once a day PM (11 May 2022 09:59)    MEDICATIONS  (STANDING):  acetaminophen     Tablet .. 650 milliGRAM(s) Oral every 6 hours  acetaminophen   IVPB .. 750 milliGRAM(s) IV Intermittent once  albumin human  5% IVPB 250 milliLiter(s) IV Intermittent once  amLODIPine   Tablet 5 milliGRAM(s) Oral daily  aspirin enteric coated 81 milliGRAM(s) Oral daily  atorvastatin 10 milliGRAM(s) Oral at bedtime  dextrose 50% Injectable 25 Gram(s) IV Push once  heparin   Injectable 2500 Unit(s) SubCutaneous every 12 hours  insulin glargine Injectable (LANTUS) 20 Unit(s) SubCutaneous every morning  insulin lispro (ADMELOG) corrective regimen sliding scale   SubCutaneous at bedtime  insulin lispro (ADMELOG) corrective regimen sliding scale   SubCutaneous three times a day before meals  insulin lispro (ADMELOG) corrective regimen sliding scale   SubCutaneous at bedtime  levothyroxine 25 MICROGram(s) Oral daily  montelukast 10 milliGRAM(s) Oral at bedtime  tamsulosin 0.4 milliGRAM(s) Oral at bedtime    MEDICATIONS  (PRN):  ALBUTerol    90 MICROgram(s) HFA Inhaler 2 Puff(s) Inhalation every 6 hours PRN Shortness of Breath and/or Wheezing  naloxone Injectable 0.1 milliGRAM(s) IV Push every 3 minutes PRN For ANY of the following changes in patient status:  A. RR LESS THAN 10 breaths per minute, B. Oxygen saturation LESS THAN 90%, C. Sedation score of 6  ondansetron Injectable 4 milliGRAM(s) IV Push every 6 hours PRN Nausea  oxyCODONE    IR 5 milliGRAM(s) Oral every 3 hours PRN Moderate and Severe Pain (4- 10)        Physical exam:     General:               Pt is awake, alert,  appears to be in pain but not in distress                                              Neuro:                  Nonfocal                             Psych:                   A&Ox3                          Cardiovascular:   S1 & S2, regular                           Respiratory:         Air entry is fair and equal on both sides, has bilateral conducted sounds                           GI:                          Soft, nondistended and nontender, Bowel sounds active                            Ext:                        No cyanosis or edema                          Labs:                                                                           12.4   13.23 )-----------( 160      ( 13 May 2022 04:20 )             37.1             05-13    131<L>  |  94<L>  |  38<H>  ----------------------------<  174<H>  5.1   |  24  |  1.27    Ca    10.1      13 May 2022 04:20  Phos  3.2     05-13  Mg     2.00     05-13      CXR:    < from: Xray Chest 1 View- PORTABLE-Urgent (Xray Chest 1 View- PORTABLE-Urgent .) (05.12.22 @ 11:08) >  Left-sided chest tube has been removed. Lungs are clear aside from linear   atelectasis in the right midlung field. Small amount of subcutaneous   emphysema in the left chest. Tiny apical pneumothorax on the left.      COMPARISON:  May 12 at 4:49 AM      IMPRESSION:  Follow-up study post chest tube removal.      Plan:  General: 71yMale s/p  FB, Robo LVATS, pleural biopsy, LLL wedge resection. Frozen- c/w metastatic disease 5/11/2022, experiencing  pain with deep breathing.                             Neuro:                                         Pain control with Oxy  /  Tylenol PRN                            Cardiovascular:                                          Telemetry (medical test) - Reviewed by me today independently. Normal sinus rhythm with some PVCs.                                          Continue hemodynamic monitoring to prevent decompensation    HTN: Monitor hemodynamic status and continue  Norvasc    HLD: On Lipitor    CAD: Continue ASA                            Respiratory:                                         On RA, SATURATING IN MID-HIGH 90'S                                             CXR - Lungs are clear.                                                   Chest PT and frequent suctioning. Continue bronchodilators, Pulmozyme and inhaled 3% saline                                                      OOB to chair & ambulate w/ assistance.                                                           Continuous pulse oximetry for support & to prevent decompensation.                                                              Chest tube d/cd.                                                                                            GI                                         On  DASH / diabetic  diet as tolerated                                         Continue Zofran / Reglan for nausea - PRN                                         Continue bowel regimen. 	                                                                 Renal:                                         LARRY: Received D50/Insulin, Lokelma for Hyperkalemia.  K is 5.1 this morning  and Cr is trending down                                                                                               Hem/ Onc:                                         DVT prophylaxis with SQ Heparin and SCDs                                         Monitor for  signs of bleeding                           Infectious disease:                                            Monitor for fever / leukocytosis.                                          All surgical incision / chest tube  sites look clean                            Endocrine                                             DM-2 / Hyperglycemia: Continue  Lantus 20u daily, and Continue Accu-Checks with coverage.                                            Hypothyroidism: Continue Synthroid       Pertinent clinical, laboratory, radiographic, hemodynamic, echocardiographic, respiratory data, microbiologic data and chart were reviewed and analyzed frequently throughout the course of the day and night.     Patient seen, examined and plan discussed with CT Surgeon  / CTICU team during rounds.  OOB to chair and ambulate as tolerated.   Status discussed with patient  and updated plan of care  I have spent 40 minutes with this patient including 20 minutes of time coordinating care in the ICU.           Eduin Knutson MD

## 2022-05-13 NOTE — DIETITIAN INITIAL EVALUATION ADULT - PERTINENT MEDS FT
MEDICATIONS  (STANDING):  acetaminophen     Tablet .. 650 milliGRAM(s) Oral every 6 hours  acetaminophen   IVPB .. 750 milliGRAM(s) IV Intermittent once  albumin human  5% IVPB 250 milliLiter(s) IV Intermittent once  amLODIPine   Tablet 5 milliGRAM(s) Oral daily  aspirin enteric coated 81 milliGRAM(s) Oral daily  atorvastatin 10 milliGRAM(s) Oral at bedtime  dextrose 50% Injectable 25 Gram(s) IV Push once  heparin   Injectable 2500 Unit(s) SubCutaneous every 12 hours  insulin glargine Injectable (LANTUS) 20 Unit(s) SubCutaneous every morning  insulin lispro (ADMELOG) corrective regimen sliding scale   SubCutaneous at bedtime  insulin lispro (ADMELOG) corrective regimen sliding scale   SubCutaneous three times a day before meals  insulin lispro (ADMELOG) corrective regimen sliding scale   SubCutaneous at bedtime  levothyroxine 25 MICROGram(s) Oral daily  montelukast 10 milliGRAM(s) Oral at bedtime  tamsulosin 0.4 milliGRAM(s) Oral at bedtime    MEDICATIONS  (PRN):  ALBUTerol    90 MICROgram(s) HFA Inhaler 2 Puff(s) Inhalation every 6 hours PRN Shortness of Breath and/or Wheezing  naloxone Injectable 0.1 milliGRAM(s) IV Push every 3 minutes PRN For ANY of the following changes in patient status:  A. RR LESS THAN 10 breaths per minute, B. Oxygen saturation LESS THAN 90%, C. Sedation score of 6  ondansetron Injectable 4 milliGRAM(s) IV Push every 6 hours PRN Nausea  oxyCODONE    IR 5 milliGRAM(s) Oral every 3 hours PRN Moderate and Severe Pain (4- 10)

## 2022-05-13 NOTE — DIETITIAN INITIAL EVALUATION ADULT - PERTINENT LABORATORY DATA
05-13    131<L>  |  94<L>  |  38<H>  ----------------------------<  174<H>  5.1   |  24  |  1.27    Ca    10.1      13 May 2022 04:20  Phos  3.2     05-13  Mg     2.00     05-13    POCT Blood Glucose.: 174 mg/dL (05-13-22 @ 07:41)  A1C with Estimated Average Glucose Result: 6.8 % (05-04-22 @ 13:28)

## 2022-05-13 NOTE — DISCHARGE NOTE NURSING/CASE MANAGEMENT/SOCIAL WORK - PATIENT PORTAL LINK FT
You can access the FollowMyHealth Patient Portal offered by [facility] by registering at the following website: [email or web address]. By joining [employer]’s FollowMyHealth portal, you will also be able to view your health information using other applications (apps) compatible with our system.

## 2022-05-13 NOTE — DISCHARGE NOTE PROVIDER - NSDCMRMEDTOKEN_GEN_ALL_CORE_FT
amLODIPine 5 mg oral tablet: 1 tab(s) orally once a day (at bedtime)  aspirin 81 mg oral tablet: 1 tab(s) orally once a day PM  atorvastatin 10 mg oral tablet: 1 tab(s) orally once a day (at bedtime)  Lantus 100 units/mL subcutaneous solution: 20 unit(s) subcutaneous once a day AM  lisinopril 5 mg oral tablet: 1 tab(s) orally once a day AM  montelukast 10 mg oral tablet: 1 tab(s) orally once a day (at bedtime)  Synthroid 25 mcg (0.025 mg) oral tablet: 1 tab(s) orally once a day AM  Ventolin HFA 90 mcg/inh inhalation aerosol: 2 puff(s) inhaled every 6 hours, As Needed  vitamin B complex: 1 cap(s) orally once a day AM  Vitamin C: 1 tab(s) orally once a day PM   amLODIPine 5 mg oral tablet: 1 tab(s) orally once a day (at bedtime)  aspirin 81 mg oral tablet: 1 tab(s) orally once a day PM  atorvastatin 10 mg oral tablet: 1 tab(s) orally once a day (at bedtime)  CXR: CXR PA and Lateral Dx: Lung nodule Please fax results to [phone].   Lantus 100 units/mL subcutaneous solution: 20 unit(s) subcutaneous once a day AM  lisinopril 5 mg oral tablet: 1 tab(s) orally once a day AM  montelukast 10 mg oral tablet: 1 tab(s) orally once a day (at bedtime)  oxyCODONE 5 mg oral tablet: 1 tab(s) orally every 4 hours (5 times/day), As Needed -Moderate and Severe Pain (4- 10) MDD:5  Synthroid 25 mcg (0.025 mg) oral tablet: 1 tab(s) orally once a day AM  tamsulosin 0.4 mg oral capsule: 1 cap(s) orally once a day (at bedtime)  Ventolin HFA 90 mcg/inh inhalation aerosol: 2 puff(s) inhaled every 6 hours, As Needed  vitamin B complex: 1 cap(s) orally once a day AM  Vitamin C: 1 tab(s) orally once a day PM

## 2022-05-13 NOTE — DIETITIAN INITIAL EVALUATION ADULT - OTHER INFO
70 y/o male with lung nodule s/p LVATS and LLL wedge resection 5/11. Pt not eating well due to dislike of hospital food. No food preferences offered. Pt had some nausea yesterday but no other GI distress reported. Offered ONS of [employer] Therapeutic Nutrition Shake 240mls 3x daily (660kcals, 30g protein) but pt declined at this time.  - 312 mg/dl with Lantus and Admelog insulins ordered for glycemic coverage. Recommend multivitamin for micronutrient supplementation as pt has suboptimal oral intake. Encourage PO as tolerated. Pt reports following diabetic diet at home. Hb A1C 6.8% indicating good glycemic control PTA. RDN services to remain available as needed.

## 2022-05-13 NOTE — DIETITIAN INITIAL EVALUATION ADULT - NSICDXPASTSURGICALHX_GEN_ALL_CORE_FT
PAST SURGICAL HISTORY:  Cataract Excisiion right eye    H/O cardiac pacemaker 2014    History of cholecystectomy 2009    Tumor of parotid gland s/p resection    
English

## 2022-05-13 NOTE — DISCHARGE NOTE PROVIDER - HOSPITAL COURSE
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 Pt underwent a FB, Robo LVATS, LLL wedge, Plueral biopsy on 5/11/22. Pt had some LARRY on POD1, which resolved with IV fluids. Pt had his CT removed on POD1, CXR on POD2 stable. Pt cleared for DC on 5/13/22

## 2022-05-13 NOTE — DIETITIAN INITIAL EVALUATION ADULT - NSFNSGIIOFT_GEN_A_CORE
05-12-22 @ 07:01  -  05-13-22 @ 07:00  --------------------------------------------------------  OUT:    Chest Tube (mL): 0 mL  Total OUT: 0 mL    Total NET: 0 mL

## 2022-05-19 LAB — SURGICAL PATHOLOGY STUDY: SIGNIFICANT CHANGE UP

## 2022-05-26 ENCOUNTER — APPOINTMENT (OUTPATIENT)
Dept: RADIOLOGY | Facility: HOSPITAL | Age: 72
End: 2022-05-26

## 2022-05-26 ENCOUNTER — RESULT REVIEW (OUTPATIENT)
Age: 72
End: 2022-05-26

## 2022-05-26 ENCOUNTER — APPOINTMENT (OUTPATIENT)
Dept: THORACIC SURGERY | Facility: CLINIC | Age: 72
End: 2022-05-26
Payer: COMMERCIAL

## 2022-05-26 ENCOUNTER — OUTPATIENT (OUTPATIENT)
Dept: OUTPATIENT SERVICES | Facility: HOSPITAL | Age: 72
LOS: 1 days | End: 2022-05-26
Payer: COMMERCIAL

## 2022-05-26 VITALS
SYSTOLIC BLOOD PRESSURE: 117 MMHG | RESPIRATION RATE: 16 BRPM | BODY MASS INDEX: 21.35 KG/M2 | OXYGEN SATURATION: 98 % | WEIGHT: 116 LBS | HEART RATE: 62 BPM | DIASTOLIC BLOOD PRESSURE: 71 MMHG | HEIGHT: 62 IN

## 2022-05-26 DIAGNOSIS — H26.9 UNSPECIFIED CATARACT: Chronic | ICD-10-CM

## 2022-05-26 DIAGNOSIS — Z95.0 PRESENCE OF CARDIAC PACEMAKER: Chronic | ICD-10-CM

## 2022-05-26 DIAGNOSIS — Z90.49 ACQUIRED ABSENCE OF OTHER SPECIFIED PARTS OF DIGESTIVE TRACT: Chronic | ICD-10-CM

## 2022-05-26 DIAGNOSIS — D49.0 NEOPLASM OF UNSPECIFIED BEHAVIOR OF DIGESTIVE SYSTEM: Chronic | ICD-10-CM

## 2022-05-26 DIAGNOSIS — C78.00 SECONDARY MALIGNANT NEOPLASM OF UNSPECIFIED LUNG: ICD-10-CM

## 2022-05-26 PROCEDURE — 71046 X-RAY EXAM CHEST 2 VIEWS: CPT | Mod: 26

## 2022-05-26 PROCEDURE — 99024 POSTOP FOLLOW-UP VISIT: CPT

## 2022-05-27 ENCOUNTER — OUTPATIENT (OUTPATIENT)
Dept: OUTPATIENT SERVICES | Facility: HOSPITAL | Age: 72
LOS: 1 days | Discharge: HOME | End: 2022-05-27

## 2022-05-27 DIAGNOSIS — Z95.0 PRESENCE OF CARDIAC PACEMAKER: Chronic | ICD-10-CM

## 2022-05-27 DIAGNOSIS — D02.20 CARCINOMA IN SITU OF UNSPECIFIED BRONCHUS AND LUNG: ICD-10-CM

## 2022-05-27 DIAGNOSIS — Z90.49 ACQUIRED ABSENCE OF OTHER SPECIFIED PARTS OF DIGESTIVE TRACT: Chronic | ICD-10-CM

## 2022-05-27 DIAGNOSIS — D49.0 NEOPLASM OF UNSPECIFIED BEHAVIOR OF DIGESTIVE SYSTEM: Chronic | ICD-10-CM

## 2022-05-27 DIAGNOSIS — H26.9 UNSPECIFIED CATARACT: Chronic | ICD-10-CM

## 2022-06-01 ENCOUNTER — APPOINTMENT (OUTPATIENT)
Dept: HEMATOLOGY ONCOLOGY | Facility: CLINIC | Age: 72
End: 2022-06-01
Payer: COMMERCIAL

## 2022-06-01 VITALS
SYSTOLIC BLOOD PRESSURE: 122 MMHG | DIASTOLIC BLOOD PRESSURE: 70 MMHG | OXYGEN SATURATION: 99 % | BODY MASS INDEX: 21.79 KG/M2 | RESPIRATION RATE: 16 BRPM | TEMPERATURE: 97.2 F | HEIGHT: 61.97 IN | HEART RATE: 63 BPM | WEIGHT: 118.39 LBS

## 2022-06-01 DIAGNOSIS — Z78.9 OTHER SPECIFIED HEALTH STATUS: ICD-10-CM

## 2022-06-01 PROCEDURE — 99072 ADDL SUPL MATRL&STAF TM PHE: CPT

## 2022-06-01 PROCEDURE — 99205 OFFICE O/P NEW HI 60 MIN: CPT

## 2022-06-01 PROCEDURE — G2212 PROLONG OUTPT/OFFICE VIS: CPT

## 2022-06-01 NOTE — RESULTS/DATA
[FreeTextEntry1] : Images Reviewed/Interpreted:\par \par -CT Neck 11/3/21:  Stable follow-up CT examination without evidence of residual or recurrent tumor in the left parotidectomy bed.  Unchanged lytic lucency within the left side of the mandible of uncertain etiology. It demonstrated FDG uptake in the past. Additional carious left-sided maxillary second molar tooth. Correlate with dental examination.  No new or enlarging cervical lymph nodes.  Worsening left-sided mastoid air cell effusion.\par \par -CT Chest 11/3/21:  New and enlarging left pulmonary metastases.  Enlarging right axillary chest wall metastasis.\par \par -PET/CT 12/15/21:  Compared to FDG-PET/CT scan dated 5/28/2019:\par 1. Non-FDG-avid postsurgical changes of left sided parotidectomy and flap reconstruction and left neck dissection, unchanged. Resolution of ill-defined, mild FDG activity in parotid bed.\par 2. New, intense FDG activity in left greater than right temporalis muscle and right greater than left masseter and pterygoid muscles likely is related to bruxism. Please correlate clinically. A new focus of increased FDG activity inferior, and medial to left mastoid (SUV 6.6; image 47 of dedicated head and neck series), without definite corresponding abnormality on CT, may be physiologic activity.\par 3. Resolution of FDG activity in molar region of left mandible. Previously seen corresponding lucency in left mandibular alveolus is decreased in size (image 61).\par 4. New small FDG-avid left lower lobe pulmonary nodule, better delineated on recent diagnostic CT, is indeterminate. Comparison with recent diagnostic CT is limited due to differences in technique. Differential diagnosis includes inflammatory/infectious and metastatic disease. Additional subcentimeter nodule seen on recent diagnostic CT are not well evaluated due to CT technique, and are too small to characterize with PET. Nonspecific, tiny focus of mild FDG activity in left upper lobe is unchanged.\par 5. Focus of mild FDG activity in left perihilar region is significantly decreased in metabolism (SUV 2.9; image 86; previous SUV 6.0). Resolution of additional previously seen FDG-avid foci in bilateral hilar regions.\par 6. Nonspecific small, mildly FDG-avid focus of skin thickening in right axillary region, new as compared to prior PET/CT, and corresponding to location of 2 cm peripherally enhancing hypodense cutaneous lesion seen on CT dated 11/3/2021 (SUV 1.7; image 70), likely resolving inflammatory process.\par \par -CT Chest 4/2/22:  Stable pulmonary nodules.\par \par

## 2022-06-01 NOTE — HISTORY OF PRESENT ILLNESS
[Disease: _____________________] : Disease: [unfilled] [AJCC Stage: ____] : AJCC Stage: [unfilled] [de-identified] : Patient is status post left parotidectomy and neck dissection with reconstruction in April 2019 for a salivary duct carcinoma.  He was treated with adjuvant RT completed August 2019.  He has been followed with surveillance scans.  A CT chest in November 2021 revealed left-sided pulmonary nodules concerning for metastases.  PET/CT performed in December 2021 revealed the lung nodules to be indeterminant based on size; a right axillary focus was felt to be related to an inflammatory process.  Patient's family notes a right axillary abscess that was treated and improved following antibiotic therapy around this time.  Based on the scan findings, he was referred to thoracic surgery.  A follow-up CT chest in April 2022 revealed stable lung nodules however they were concerning for metastases.  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  Appetite is decreased.  He has a dry cough.  He spends most of his day resting.  He does walk around a little bit however his family takes care of most of his needs.  He reports that his functional status has not changed over the past year or more.  The family does report that he was treated for a right axillary abscess last year that improved with antibiotics.\par \par All other ROS otherwise as outlined or noncontributory. [de-identified] : – Lung LLL wedge resection/pleural biopsy/diaphragmatic nodule biopsy 5/11/2022: Adenocarcinoma consistent with salivary duct carcinoma.\par IHC is positive for AR and HER2 positive at 3+.

## 2022-06-01 NOTE — PHYSICAL EXAM
[Ambulatory and capable of all self care but unable to carry out any work activities] : Status 2- Ambulatory and capable of all self care but unable to carry out any work activities. Up and about more than 50% of waking hours [Thin] : thin [Normal] : affect appropriate [de-identified] : NO icterus [de-identified] : MMM O/P Clear [de-identified] : Post-treatment changes to Left Neck [de-identified] : Clear [de-identified] : S1 S2 [de-identified] : NO edema [de-identified] : Soft [de-identified] : No palp Right axillary LAD [de-identified] : No spine/CVA tenderness [de-identified] : Ambulatory Handoff

## 2022-06-01 NOTE — ASSESSMENT
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  Pathologically proven metastatic disease with pleural and lung metastases in May 2022.  I had an extensive discussion with the patient and his family regarding the management of his disease.  Discussed that his disease represents metastatic disease and is not curable.  The patient's functional status is borderline and appears to be unchanged over some time.  He does not appear to be particularly symptomatic from his disease.  Discussed the role of systemic therapy in the setting of metastatic salivary duct cancer would be indicated for symptomatic disease or rapidly progressive disease.  Of note, between December 2021 and April 2022, radiologically, his disease remained stable which gives some sense of the indolency of his malignancy.  The right axillary finding appears to be an inflammatory/infectious process that is not evident on physical exam.  Recommend obtaining a restaging CT neck/chest/abdomen/pelvis in July 2022 and follow-up to review the results.  Recommend monitoring him off systemic therapy at this time given that there is not a survival benefit to starting systemic therapy without a clear indication.  Should systemic therapy be indicated, I would be inclined to treat the patient with leuprolide/bicalutamide targeting his AR receptor positivity given his borderline functional status.  Alternate systemic therapy options would include HER2 directed therapy such as trastuzumab in combination with chemotherapy (carboplatin and paclitaxel).  We will have the patient follow-up to review his restaging scan results in July and assess need for systemic therapy versus continued observation.  Check-out form provided to patient with instructions for making appointments.  All questions answered to their apparent satisfaction.   [Palliative] : Goals of care discussed with patient: Palliative [Palliative Care Plan] : not applicable at this time

## 2022-07-11 ENCOUNTER — OUTPATIENT (OUTPATIENT)
Dept: OUTPATIENT SERVICES | Facility: HOSPITAL | Age: 72
LOS: 1 days | Discharge: HOME | End: 2022-07-11

## 2022-07-11 DIAGNOSIS — D49.0 NEOPLASM OF UNSPECIFIED BEHAVIOR OF DIGESTIVE SYSTEM: Chronic | ICD-10-CM

## 2022-07-11 DIAGNOSIS — H26.9 UNSPECIFIED CATARACT: Chronic | ICD-10-CM

## 2022-07-11 DIAGNOSIS — Z90.49 ACQUIRED ABSENCE OF OTHER SPECIFIED PARTS OF DIGESTIVE TRACT: Chronic | ICD-10-CM

## 2022-07-11 DIAGNOSIS — Z95.0 PRESENCE OF CARDIAC PACEMAKER: Chronic | ICD-10-CM

## 2022-07-11 DIAGNOSIS — D02.3: ICD-10-CM

## 2022-07-12 ENCOUNTER — APPOINTMENT (OUTPATIENT)
Dept: CT IMAGING | Facility: IMAGING CENTER | Age: 72
End: 2022-07-12

## 2022-07-12 ENCOUNTER — OUTPATIENT (OUTPATIENT)
Dept: OUTPATIENT SERVICES | Facility: HOSPITAL | Age: 72
LOS: 1 days | End: 2022-07-12
Payer: MEDICARE

## 2022-07-12 DIAGNOSIS — D49.0 NEOPLASM OF UNSPECIFIED BEHAVIOR OF DIGESTIVE SYSTEM: Chronic | ICD-10-CM

## 2022-07-12 DIAGNOSIS — Z90.49 ACQUIRED ABSENCE OF OTHER SPECIFIED PARTS OF DIGESTIVE TRACT: Chronic | ICD-10-CM

## 2022-07-12 DIAGNOSIS — H26.9 UNSPECIFIED CATARACT: Chronic | ICD-10-CM

## 2022-07-12 DIAGNOSIS — C78.2 SECONDARY MALIGNANT NEOPLASM OF PLEURA: ICD-10-CM

## 2022-07-12 DIAGNOSIS — Z95.0 PRESENCE OF CARDIAC PACEMAKER: Chronic | ICD-10-CM

## 2022-07-12 PROCEDURE — 74177 CT ABD & PELVIS W/CONTRAST: CPT | Mod: 26,MH

## 2022-07-12 PROCEDURE — 70491 CT SOFT TISSUE NECK W/DYE: CPT

## 2022-07-12 PROCEDURE — 71260 CT THORAX DX C+: CPT

## 2022-07-12 PROCEDURE — 74177 CT ABD & PELVIS W/CONTRAST: CPT

## 2022-07-12 PROCEDURE — 70491 CT SOFT TISSUE NECK W/DYE: CPT | Mod: 26,MH

## 2022-07-12 PROCEDURE — 71260 CT THORAX DX C+: CPT | Mod: 26,MH

## 2022-07-22 ENCOUNTER — APPOINTMENT (OUTPATIENT)
Dept: HEMATOLOGY ONCOLOGY | Facility: CLINIC | Age: 72
End: 2022-07-22

## 2022-07-22 VITALS
BODY MASS INDEX: 21.79 KG/M2 | TEMPERATURE: 98.1 F | SYSTOLIC BLOOD PRESSURE: 135 MMHG | HEIGHT: 61.97 IN | WEIGHT: 118.39 LBS | RESPIRATION RATE: 16 BRPM | OXYGEN SATURATION: 97 % | HEART RATE: 70 BPM | DIASTOLIC BLOOD PRESSURE: 72 MMHG

## 2022-07-22 PROCEDURE — 99214 OFFICE O/P EST MOD 30 MIN: CPT

## 2022-07-28 NOTE — PHYSICAL EXAM
[Ambulatory and capable of all self care but unable to carry out any work activities] : Status 2- Ambulatory and capable of all self care but unable to carry out any work activities. Up and about more than 50% of waking hours [Thin] : thin [Normal] : affect appropriate [de-identified] : NO icterus [de-identified] : MMM O/P Clear [de-identified] : Post-treatment changes to Left Neck [de-identified] : Clear [de-identified] : S1 S2 [de-identified] : NO edema [de-identified] : Soft [de-identified] : No palp Right axillary LAD [de-identified] : No spine/CVA tenderness [de-identified] : Ambulatory

## 2022-07-28 NOTE — HISTORY OF PRESENT ILLNESS
[Disease: _____________________] : Disease: [unfilled] [AJCC Stage: ____] : AJCC Stage: [unfilled] [de-identified] : Patient is status post left parotidectomy and neck dissection with reconstruction in April 2019 for a salivary duct carcinoma.  He was treated with adjuvant RT completed August 2019.  He has been followed with surveillance scans.  A CT chest in November 2021 revealed left-sided pulmonary nodules concerning for metastases.  PET/CT performed in December 2021 revealed the lung nodules to be indeterminant based on size; a right axillary focus was felt to be related to an inflammatory process.  Patient's family notes a right axillary abscess that was treated and improved following antibiotic therapy around this time.  Based on the scan findings, he was referred to thoracic surgery.  A follow-up CT chest in April 2022 revealed stable lung nodules however they were concerning for metastases.  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   [de-identified] : – Lung LLL wedge resection/pleural biopsy/diaphragmatic nodule biopsy 5/11/2022: Adenocarcinoma consistent with salivary duct carcinoma.\par IHC is positive for AR and HER2 positive at 3+. [de-identified] : *Patient's spouse and son are present; included patient's cousin on speaker phone, who is a doctor at [employer].\par \par He spends most of his day resting.  He does walk around a little bit however his family takes care of most of his needs.  Has dry cough.  Symptoms have not significantly changed since his initial visit with me.  \par \par Restaging CT neck is negative for local recurrence of disease.\par Restaging CT C/A/P with minor change in left lung/pleural metastases which have very mildly enlarged by 1-2 mm since April 2022.

## 2022-07-28 NOTE — RESULTS/DATA
[FreeTextEntry1] : -CT Neck 11/3/21:  Stable follow-up CT examination without evidence of residual or recurrent tumor in the left parotidectomy bed.  Unchanged lytic lucency within the left side of the mandible of uncertain etiology. It demonstrated FDG uptake in the past. Additional carious left-sided maxillary second molar tooth. Correlate with dental examination.  No new or enlarging cervical lymph nodes.  Worsening left-sided mastoid air cell effusion.\par \par -CT Chest 11/3/21:  New and enlarging left pulmonary metastases.  Enlarging right axillary chest wall metastasis.\par \par -PET/CT 12/15/21:  Compared to FDG-PET/CT scan dated 5/28/2019:\par 1. Non-FDG-avid postsurgical changes of left sided parotidectomy and flap reconstruction and left neck dissection, unchanged. Resolution of ill-defined, mild FDG activity in parotid bed.\par 2. New, intense FDG activity in left greater than right temporalis muscle and right greater than left masseter and pterygoid muscles likely is related to bruxism. Please correlate clinically. A new focus of increased FDG activity inferior, and medial to left mastoid (SUV 6.6; image 47 of dedicated head and neck series), without definite corresponding abnormality on CT, may be physiologic activity.\par 3. Resolution of FDG activity in molar region of left mandible. Previously seen corresponding lucency in left mandibular alveolus is decreased in size (image 61).\par 4. New small FDG-avid left lower lobe pulmonary nodule, better delineated on recent diagnostic CT, is indeterminate. Comparison with recent diagnostic CT is limited due to differences in technique. Differential diagnosis includes inflammatory/infectious and metastatic disease. Additional subcentimeter nodule seen on recent diagnostic CT are not well evaluated due to CT technique, and are too small to characterize with PET. Nonspecific, tiny focus of mild FDG activity in left upper lobe is unchanged.\par 5. Focus of mild FDG activity in left perihilar region is significantly decreased in metabolism (SUV 2.9; image 86; previous SUV 6.0). Resolution of additional previously seen FDG-avid foci in bilateral hilar regions.\par 6. Nonspecific small, mildly FDG-avid focus of skin thickening in right axillary region, new as compared to prior PET/CT, and corresponding to location of 2 cm peripherally enhancing hypodense cutaneous lesion seen on CT dated 11/3/2021 (SUV 1.7; image 70), likely resolving inflammatory process.\par \par -CT Chest 4/2/22:  Stable pulmonary nodules.\par \par Images Reviewed/Interpreted:\par \par -Outside labs from 6/19/22 reviewed and scanned into chart. \par \par – CT Neck 7/12/22:  Status post left parotidectomy. No recurrent mass or abnormal enhancement is identified. No cervical lymphadenopathy.  \par \par -CT C/A/P 7/12/22:  Numerous left lung/pleural metastases, several of which have mildly enlarged (by 1-2 mm) since April 2022.  No metastatic disease in the abdomen or pelvis.\par \par

## 2022-07-28 NOTE — ASSESSMENT
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  Pathologically proven metastatic disease with pleural and lung metastases in May 2022.  The patient's functional status is borderline and appears to be unchanged over some time.  He is not particularly symptomatic from his disease.  The role of systemic therapy in the setting of metastatic salivary duct cancer would be for symptomatic disease or rapidly progressive disease.  His cancer has been rather indolent.  \par Restaging CT July 2022 with overall stable disease by RECIST criteria.  \par Recommend continuing to monitor him off systemic therapy.  given that there is not a survival benefit to starting systemic therapy without a clear indication.  Should systemic therapy be indicated, I would be inclined to treat the patient with leuprolide/bicalutamide targeting his AR receptor positivity given his borderline functional status.  Alternate systemic therapy options would include HER2 directed therapy such as trastuzumab in combination with chemotherapy (carboplatin and paclitaxel).  \par Recommend obtaining a restaging CT Chest in 3 months (Oct) and follow-up to review the results.  Check-out form provided to patient with instructions for making appointments.  He plans to travel to [facility] in November for 2 months.  All questions answered to their apparent satisfaction.

## 2022-09-18 NOTE — ASU PATIENT PROFILE, ADULT - NS PRO PASSIVE SMOKE EXP
No patient presents  c/o back pain radiating to L leg, L arm numbness x 7 AM, vomiting and L sided abdominal pain

## 2022-10-03 ENCOUNTER — APPOINTMENT (OUTPATIENT)
Dept: CT IMAGING | Facility: IMAGING CENTER | Age: 72
End: 2022-10-03

## 2022-10-03 ENCOUNTER — OUTPATIENT (OUTPATIENT)
Dept: OUTPATIENT SERVICES | Facility: HOSPITAL | Age: 72
LOS: 1 days | End: 2022-10-03
Payer: MEDICARE

## 2022-10-03 DIAGNOSIS — D49.0 NEOPLASM OF UNSPECIFIED BEHAVIOR OF DIGESTIVE SYSTEM: Chronic | ICD-10-CM

## 2022-10-03 DIAGNOSIS — C08.9 MALIGNANT NEOPLASM OF MAJOR SALIVARY GLAND, UNSPECIFIED: ICD-10-CM

## 2022-10-03 DIAGNOSIS — Z90.49 ACQUIRED ABSENCE OF OTHER SPECIFIED PARTS OF DIGESTIVE TRACT: Chronic | ICD-10-CM

## 2022-10-03 DIAGNOSIS — Z95.0 PRESENCE OF CARDIAC PACEMAKER: Chronic | ICD-10-CM

## 2022-10-03 DIAGNOSIS — C78.00 SECONDARY MALIGNANT NEOPLASM OF UNSPECIFIED LUNG: ICD-10-CM

## 2022-10-03 DIAGNOSIS — H26.9 UNSPECIFIED CATARACT: Chronic | ICD-10-CM

## 2022-10-03 PROCEDURE — 71250 CT THORAX DX C-: CPT

## 2022-10-03 PROCEDURE — 71250 CT THORAX DX C-: CPT | Mod: 26,MH

## 2022-10-06 ENCOUNTER — OUTPATIENT (OUTPATIENT)
Dept: OUTPATIENT SERVICES | Facility: HOSPITAL | Age: 72
LOS: 1 days | Discharge: HOME | End: 2022-10-06

## 2022-10-06 DIAGNOSIS — H26.9 UNSPECIFIED CATARACT: Chronic | ICD-10-CM

## 2022-10-06 DIAGNOSIS — D02.20 CARCINOMA IN SITU OF UNSPECIFIED BRONCHUS AND LUNG: ICD-10-CM

## 2022-10-06 DIAGNOSIS — D49.0 NEOPLASM OF UNSPECIFIED BEHAVIOR OF DIGESTIVE SYSTEM: Chronic | ICD-10-CM

## 2022-10-06 DIAGNOSIS — Z90.49 ACQUIRED ABSENCE OF OTHER SPECIFIED PARTS OF DIGESTIVE TRACT: Chronic | ICD-10-CM

## 2022-10-06 DIAGNOSIS — Z95.0 PRESENCE OF CARDIAC PACEMAKER: Chronic | ICD-10-CM

## 2022-10-12 ENCOUNTER — APPOINTMENT (OUTPATIENT)
Dept: HEMATOLOGY ONCOLOGY | Facility: CLINIC | Age: 72
End: 2022-10-12

## 2022-10-12 VITALS
HEART RATE: 66 BPM | HEIGHT: 62 IN | OXYGEN SATURATION: 100 % | WEIGHT: 123.24 LBS | RESPIRATION RATE: 16 BRPM | SYSTOLIC BLOOD PRESSURE: 160 MMHG | TEMPERATURE: 97.4 F | BODY MASS INDEX: 22.68 KG/M2 | DIASTOLIC BLOOD PRESSURE: 72 MMHG

## 2022-10-12 PROCEDURE — 99215 OFFICE O/P EST HI 40 MIN: CPT

## 2022-10-12 NOTE — HISTORY OF PRESENT ILLNESS
[Disease: _____________________] : Disease: [unfilled] [AJCC Stage: ____] : AJCC Stage: [unfilled] [de-identified] : Patient is status post left parotidectomy and neck dissection with reconstruction in April 2019 for a salivary duct carcinoma.  He was treated with adjuvant RT completed August 2019.  He has been followed with surveillance scans.  A CT chest in November 2021 revealed left-sided pulmonary nodules concerning for metastases.  PET/CT performed in December 2021 revealed the lung nodules to be indeterminant based on size; a right axillary focus was felt to be related to an inflammatory process.  Patient's family notes a right axillary abscess that was treated and improved following antibiotic therapy around this time.  Based on the scan findings, he was referred to thoracic surgery.  A follow-up CT chest in April 2022 revealed stable lung nodules however they were concerning for metastases.  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  Followed with surveillance scans.   [de-identified] : – Lung LLL wedge resection/pleural biopsy/diaphragmatic nodule biopsy 5/11/2022: Adenocarcinoma consistent with salivary duct carcinoma.\par IHC is positive for AR and HER2 positive at 3+. [de-identified] : *[employer]  #[id number]\par *Patient's spouse present; included patient's son on speaker phone.\par \par He reports feeling overall stable since his last visit.  He remains very sedentary.  He describes fatigue.  He reports a cough in the mornings productive of sputum, which is possibly occasionally blood tinged.  He reports this only occurs in the mornings and has been going on for many months.  Patient's wife reports that his stomach is distended.  He complains of chronic constipation.  His PCP referred him to a GI doctor for colonoscopy as he never had one; this is currently scheduled for late December.  He plans to travel to [facility] in December for at least 1 month.\par \par Restaging CT chest with minor change in the left lung and pleural metastases since the prior study.

## 2022-10-12 NOTE — ASSESSMENT
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  Pathologically proven metastatic disease with pleural and lung metastases in May 2022.  The patient's functional status is borderline and appears to be unchanged over some time.  He is not particularly symptomatic from his disease.  The role of systemic therapy in the setting of metastatic salivary duct cancer would be for symptomatic disease or rapidly progressive disease.  His cancer has behaved rather indolently.  \par Restaging CT Oct 2022 with overall stable disease by RECIST criteria.  \par Recommend continuing to monitor him off systemic therapy given that there is not a survival benefit to starting systemic therapy without a clear indication.  Should systemic therapy be indicated, I would be inclined to treat the patient with leuprolide/bicalutamide targeting his AR receptor positivity given his borderline functional status.  Alternate systemic therapy options would include HER2 directed therapy such as trastuzumab in combination with chemotherapy (carboplatin and paclitaxel).  He plans to travel to [facility] in December and I also would be hesitant to start him on any systemic therapy prior to a trip such as that without a clear indication.  \par They inquired whether it was safe/permissible for him to travel and I discussed that this was a judgment call on their part.  The patient and his wife expressed confusion regarding his disease status; I again reviewed with them at length the status of his disease and the rationale for the chosen course utilizing the .  Recommend obtaining a restaging CT chest in roughly 3 months (January) upon their return from [facility] and to follow-up to review the results.  Should there be further change in the disease, could consider initiating systemic therapy targeting the AR receptor.  \par All questions answered to their apparent satisfaction.

## 2022-10-12 NOTE — RESULTS/DATA
[FreeTextEntry1] : -CT Neck 11/3/21:  Stable follow-up CT examination without evidence of residual or recurrent tumor in the left parotidectomy bed.  Unchanged lytic lucency within the left side of the mandible of uncertain etiology. It demonstrated FDG uptake in the past. Additional carious left-sided maxillary second molar tooth. Correlate with dental examination.  No new or enlarging cervical lymph nodes.  Worsening left-sided mastoid air cell effusion.\par \par -CT Chest 11/3/21:  New and enlarging left pulmonary metastases.  Enlarging right axillary chest wall metastasis.\par \par -PET/CT 12/15/21:  Compared to FDG-PET/CT scan dated 5/28/2019:\par 1. Non-FDG-avid postsurgical changes of left sided parotidectomy and flap reconstruction and left neck dissection, unchanged. Resolution of ill-defined, mild FDG activity in parotid bed.\par 2. New, intense FDG activity in left greater than right temporalis muscle and right greater than left masseter and pterygoid muscles likely is related to bruxism. Please correlate clinically. A new focus of increased FDG activity inferior, and medial to left mastoid (SUV 6.6; image 47 of dedicated head and neck series), without definite corresponding abnormality on CT, may be physiologic activity.\par 3. Resolution of FDG activity in molar region of left mandible. Previously seen corresponding lucency in left mandibular alveolus is decreased in size (image 61).\par 4. New small FDG-avid left lower lobe pulmonary nodule, better delineated on recent diagnostic CT, is indeterminate. Comparison with recent diagnostic CT is limited due to differences in technique. Differential diagnosis includes inflammatory/infectious and metastatic disease. Additional subcentimeter nodule seen on recent diagnostic CT are not well evaluated due to CT technique, and are too small to characterize with PET. Nonspecific, tiny focus of mild FDG activity in left upper lobe is unchanged.\par 5. Focus of mild FDG activity in left perihilar region is significantly decreased in metabolism (SUV 2.9; image 86; previous SUV 6.0). Resolution of additional previously seen FDG-avid foci in bilateral hilar regions.\par 6. Nonspecific small, mildly FDG-avid focus of skin thickening in right axillary region, new as compared to prior PET/CT, and corresponding to location of 2 cm peripherally enhancing hypodense cutaneous lesion seen on CT dated 11/3/2021 (SUV 1.7; image 70), likely resolving inflammatory process.\par \par -CT Chest 4/2/22:  Stable pulmonary nodules.\par \par – CT Neck 7/12/22:  Status post left parotidectomy. No recurrent mass or abnormal enhancement is identified. No cervical lymphadenopathy.  \par \par -CT C/A/P 7/12/22:  Numerous left lung/pleural metastases, several of which have mildly enlarged (by 1-2 mm) since April 2022.  No metastatic disease in the abdomen or pelvis.\par \par Images Reviewed/Interpreted:\par \par –CT Chest 10/3/22:  Several left lung and pleural metastases, some unchanged and others slightly larger.\par \par -Outside labs from 10/4/22 reviewed and scanned into chart. \par \par

## 2022-10-12 NOTE — PHYSICAL EXAM
[Ambulatory and capable of all self care but unable to carry out any work activities] : Status 2- Ambulatory and capable of all self care but unable to carry out any work activities. Up and about more than 50% of waking hours [Thin] : thin [Normal] : affect appropriate [de-identified] : No icterus [de-identified] : JEANNETTE  [de-identified] : Post-treatment changes to Left Neck [de-identified] : Clear [de-identified] : S1 S2 [de-identified] : NO edema [de-identified] : Soft NT No Masses  [de-identified] : No spine/CVA tenderness [de-identified] : Ambulatory

## 2022-11-29 NOTE — CONSULT LETTER
Quality 226: Preventive Care And Screening: Tobacco Use: Screening And Cessation Intervention: Patient screened for tobacco use and is an ex/non-smoker Detail Level: Detailed [DrJerod  ___] : Dr. MCGOWAN Quality 130: Documentation Of Current Medications In The Medical Record: Current Medications Documented [DrJerod ___] : Dr. MCGOWAN Quality 431: Preventive Care And Screening: Unhealthy Alcohol Use - Screening: Patient not identified as an unhealthy alcohol user when screened for unhealthy alcohol use using a systematic screening method Quality 402: Tobacco Use And Help With Quitting Among Adolescents: Patient screened for tobacco and never smoked

## 2023-01-23 NOTE — PROGRESS NOTE ADULT - REASON FOR ADMISSION
"Patient was seen 23 by PCP Clara Ambriz, I see 50 mg hydrochlorothiazide is now \"\", has \"end date\" of 23 (same day she was seen).    Was she supposed to stop the hydrochlorothiazide?   No documentation regarding that med in recent visit.    Routed to PCP to address plan for hydrochlorothiazide.    Claudia Potts RN  [facility]      " s/p L VATS wedge resection

## 2023-03-09 ENCOUNTER — OUTPATIENT (OUTPATIENT)
Dept: OUTPATIENT SERVICES | Facility: HOSPITAL | Age: 73
LOS: 1 days | End: 2023-03-09
Payer: COMMERCIAL

## 2023-03-09 ENCOUNTER — APPOINTMENT (OUTPATIENT)
Dept: CT IMAGING | Facility: IMAGING CENTER | Age: 73
End: 2023-03-09
Payer: MEDICARE

## 2023-03-09 DIAGNOSIS — H26.9 UNSPECIFIED CATARACT: Chronic | ICD-10-CM

## 2023-03-09 DIAGNOSIS — Z95.0 PRESENCE OF CARDIAC PACEMAKER: Chronic | ICD-10-CM

## 2023-03-09 DIAGNOSIS — C08.9 MALIGNANT NEOPLASM OF MAJOR SALIVARY GLAND, UNSPECIFIED: ICD-10-CM

## 2023-03-09 DIAGNOSIS — Z90.49 ACQUIRED ABSENCE OF OTHER SPECIFIED PARTS OF DIGESTIVE TRACT: Chronic | ICD-10-CM

## 2023-03-09 DIAGNOSIS — D49.0 NEOPLASM OF UNSPECIFIED BEHAVIOR OF DIGESTIVE SYSTEM: Chronic | ICD-10-CM

## 2023-03-09 PROCEDURE — 71250 CT THORAX DX C-: CPT

## 2023-03-09 PROCEDURE — 71250 CT THORAX DX C-: CPT | Mod: 26

## 2023-03-18 NOTE — ED ADULT NURSE NOTE - TEMPLATE
General Patient presented with complaints of abdominal pain.  Required meds, labs and imaging.  No acute findings.  Will be discharged with outpatient follow-up. 111

## 2023-04-02 ENCOUNTER — OUTPATIENT (OUTPATIENT)
Dept: OUTPATIENT SERVICES | Facility: HOSPITAL | Age: 73
LOS: 1 days | Discharge: HOME | End: 2023-04-02

## 2023-04-02 DIAGNOSIS — Z90.49 ACQUIRED ABSENCE OF OTHER SPECIFIED PARTS OF DIGESTIVE TRACT: Chronic | ICD-10-CM

## 2023-04-02 DIAGNOSIS — Z95.0 PRESENCE OF CARDIAC PACEMAKER: Chronic | ICD-10-CM

## 2023-04-02 DIAGNOSIS — D02.20 CARCINOMA IN SITU OF UNSPECIFIED BRONCHUS AND LUNG: ICD-10-CM

## 2023-04-02 DIAGNOSIS — D49.0 NEOPLASM OF UNSPECIFIED BEHAVIOR OF DIGESTIVE SYSTEM: Chronic | ICD-10-CM

## 2023-04-02 DIAGNOSIS — H26.9 UNSPECIFIED CATARACT: Chronic | ICD-10-CM

## 2023-04-05 ENCOUNTER — APPOINTMENT (OUTPATIENT)
Dept: HEMATOLOGY ONCOLOGY | Facility: CLINIC | Age: 73
End: 2023-04-05
Payer: MEDICARE

## 2023-04-05 ENCOUNTER — NON-APPOINTMENT (OUTPATIENT)
Age: 73
End: 2023-04-05

## 2023-04-05 VITALS
OXYGEN SATURATION: 100 % | RESPIRATION RATE: 17 BRPM | DIASTOLIC BLOOD PRESSURE: 82 MMHG | TEMPERATURE: 97.2 F | SYSTOLIC BLOOD PRESSURE: 145 MMHG | WEIGHT: 116.84 LBS | HEART RATE: 72 BPM | BODY MASS INDEX: 21.37 KG/M2

## 2023-04-05 PROCEDURE — 99215 OFFICE O/P EST HI 40 MIN: CPT

## 2023-04-05 RX ORDER — DICLOFENAC SODIUM 1 %
1 KIT TOPICAL
Refills: 0 | Status: ACTIVE | COMMUNITY

## 2023-04-05 RX ORDER — CALCIUM CARBONATE/VITAMIN D3 500MG-5MCG
500-200 TABLET ORAL
Refills: 0 | Status: ACTIVE | COMMUNITY

## 2023-04-05 RX ORDER — LACTULOSE 10 G/10G
10 SOLUTION ORAL
Refills: 0 | Status: ACTIVE | COMMUNITY

## 2023-04-05 RX ORDER — MONTELUKAST SODIUM 10 MG/1
10 TABLET, FILM COATED ORAL
Refills: 0 | Status: ACTIVE | COMMUNITY

## 2023-04-05 RX ORDER — ELECTROLYTES/DEXTROSE
SOLUTION, ORAL ORAL
Refills: 0 | Status: ACTIVE | COMMUNITY

## 2023-04-06 NOTE — RESULTS/DATA
[FreeTextEntry1] : -CT Neck 11/3/21:  Stable follow-up CT examination without evidence of residual or recurrent tumor in the left parotidectomy bed.  Unchanged lytic lucency within the left side of the mandible of uncertain etiology. It demonstrated FDG uptake in the past. Additional carious left-sided maxillary second molar tooth. Correlate with dental examination.  No new or enlarging cervical lymph nodes.  Worsening left-sided mastoid air cell effusion.\par \par -CT Chest 11/3/21:  New and enlarging left pulmonary metastases.  Enlarging right axillary chest wall metastasis.\par \par -PET/CT 12/15/21:  Compared to FDG-PET/CT scan dated 5/28/2019:\par 1. Non-FDG-avid postsurgical changes of left sided parotidectomy and flap reconstruction and left neck dissection, unchanged. Resolution of ill-defined, mild FDG activity in parotid bed.\par 2. New, intense FDG activity in left greater than right temporalis muscle and right greater than left masseter and pterygoid muscles likely is related to bruxism. Please correlate clinically. A new focus of increased FDG activity inferior, and medial to left mastoid (SUV 6.6; image 47 of dedicated head and neck series), without definite corresponding abnormality on CT, may be physiologic activity.\par 3. Resolution of FDG activity in molar region of left mandible. Previously seen corresponding lucency in left mandibular alveolus is decreased in size (image 61).\par 4. New small FDG-avid left lower lobe pulmonary nodule, better delineated on recent diagnostic CT, is indeterminate. Comparison with recent diagnostic CT is limited due to differences in technique. Differential diagnosis includes inflammatory/infectious and metastatic disease. Additional subcentimeter nodule seen on recent diagnostic CT are not well evaluated due to CT technique, and are too small to characterize with PET. Nonspecific, tiny focus of mild FDG activity in left upper lobe is unchanged.\par 5. Focus of mild FDG activity in left perihilar region is significantly decreased in metabolism (SUV 2.9; image 86; previous SUV 6.0). Resolution of additional previously seen FDG-avid foci in bilateral hilar regions.\par 6. Nonspecific small, mildly FDG-avid focus of skin thickening in right axillary region, new as compared to prior PET/CT, and corresponding to location of 2 cm peripherally enhancing hypodense cutaneous lesion seen on CT dated 11/3/2021 (SUV 1.7; image 70), likely resolving inflammatory process.\par \par -CT Chest 4/2/22:  Stable pulmonary nodules.\par \par – CT Neck 7/12/22:  Status post left parotidectomy. No recurrent mass or abnormal enhancement is identified. No cervical lymphadenopathy.  \par \par -CT C/A/P 7/12/22:  Numerous left lung/pleural metastases, several of which have mildly enlarged (by 1-2 mm) since April 2022.  No metastatic disease in the abdomen or pelvis.\par \par –CT Chest 10/3/22:  Several left lung and pleural metastases, some unchanged and others slightly larger.\par \par Images Reviewed/Interpreted:\par \par –CT Chest 3/9/23:  Status post wedge resection in the left lower lobe.  Multiple nodules are noted in the left lower lobe as well as involving the left major fissure. They have increased in size when compared to previous exam.\par \par – Outside labs from 3/13/2023 reviewed and scanned into chart

## 2023-04-06 NOTE — PHYSICAL EXAM
[Ambulatory and capable of all self care but unable to carry out any work activities] : Status 2- Ambulatory and capable of all self care but unable to carry out any work activities. Up and about more than 50% of waking hours [Thin] : thin [Normal] : affect appropriate [de-identified] : No icterus [de-identified] : JEANNETTE  [de-identified] : Post-treatment changes to Left Neck [de-identified] : Clear [de-identified] : S1 S2 [de-identified] : NO edema [de-identified] : Soft NT No Masses  [de-identified] : No spine/CVA tenderness [de-identified] : Ambulatory

## 2023-04-06 NOTE — HISTORY OF PRESENT ILLNESS
[Disease: _____________________] : Disease: [unfilled] [AJCC Stage: ____] : AJCC Stage: [unfilled] [de-identified] : Patient is status post left parotidectomy and neck dissection with reconstruction in April 2019 for a salivary duct carcinoma.  He was treated with adjuvant RT completed August 2019.  He has been followed with surveillance scans.  A CT chest in November 2021 revealed left-sided pulmonary nodules concerning for metastases.  PET/CT performed in December 2021 revealed the lung nodules to be indeterminant based on size; a right axillary focus was felt to be related to an inflammatory process.  Patient's family notes a right axillary abscess that was treated and improved following antibiotic therapy around this time.  Based on the scan findings, he was referred to thoracic surgery.  A follow-up CT chest in April 2022 revealed stable lung nodules however they were concerning for metastases.  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  Followed with surveillance scans.   [de-identified] : – Lung LLL wedge resection/pleural biopsy/diaphragmatic nodule biopsy 5/11/2022: Adenocarcinoma consistent with salivary duct carcinoma.\par IHC is positive for AR and HER2 positive at 3+. [de-identified] : *[employer]  #[id number]\par *Patient's spouse present; included patient's son on speaker phone.\par \par He went to [facility] in December since his last visit and stayed for significant period of time.  Reports that he "felt sick while there"; he was having leg swelling and pain that has now improved.  Denies significant cough.  Reports weakness and dyspnea, particularly at night.  He remains very sedentary.  He has significant fatigue.\par \par Restaging CT chest this month with increase in size of lung metastases compared to the prior exam.

## 2023-04-06 NOTE — ASSESSMENT
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  Pathologically proven metastatic disease with pleural and lung metastases in May 2022.  The patient's functional status is borderline and appears to be unchanged over some time.  He is not particularly symptomatic from his disease.  The role of systemic therapy in the setting of metastatic salivary duct cancer would be for symptomatic disease or rapidly progressive disease.  His cancer has behaved rather indolently.  \par Restaging CT March 2023 with continued increase in lung metastases.    I had an extensive discussion with the patient and his wife via the  and included his son on speaker phone.  Given the continued progression of his lung metastases and given the AR positivity of his tumor, recommend starting hormonal manipulation with a combination of leuprolide 11.25 mg IM every 3 months with bicalutamide 50 mg p.o. daily.  Risk, benefits and side effects of treatment discussed with the patient who agreed to proceed and signed the consent form; drug information sheets provided.  He has a borderline functional status and therefore therapy targeting his AR receptor positivity was chosen.  Alternate systemic therapy options would include HER2 directed therapy such as trastuzumab in combination with chemotherapy (carboplatin and paclitaxel).  Patient can start the bicalutamide now and we will schedule him for the first leuprolide injection next week.  We will have the patient follow-up in 3-4 weeks to assess his clinical status and plan to repeat labs at that time.  Would plan to obtain a restaging CT following 3-4 months of therapy.\par All questions answered to their apparent satisfaction.

## 2023-04-12 ENCOUNTER — APPOINTMENT (OUTPATIENT)
Dept: INFUSION THERAPY | Facility: HOSPITAL | Age: 73
End: 2023-04-12

## 2023-04-13 DIAGNOSIS — C08.9 MALIGNANT NEOPLASM OF MAJOR SALIVARY GLAND, UNSPECIFIED: ICD-10-CM

## 2023-05-03 ENCOUNTER — RESULT REVIEW (OUTPATIENT)
Age: 73
End: 2023-05-03

## 2023-05-03 ENCOUNTER — APPOINTMENT (OUTPATIENT)
Dept: HEMATOLOGY ONCOLOGY | Facility: CLINIC | Age: 73
End: 2023-05-03
Payer: MEDICARE

## 2023-05-03 VITALS
BODY MASS INDEX: 21.77 KG/M2 | DIASTOLIC BLOOD PRESSURE: 74 MMHG | SYSTOLIC BLOOD PRESSURE: 153 MMHG | HEART RATE: 74 BPM | WEIGHT: 119.05 LBS | OXYGEN SATURATION: 100 % | TEMPERATURE: 97.2 F | RESPIRATION RATE: 17 BRPM

## 2023-05-03 LAB
BASOPHILS # BLD AUTO: 0.03 K/UL — SIGNIFICANT CHANGE UP (ref 0–0.2)
BASOPHILS NFR BLD AUTO: 0.8 % — SIGNIFICANT CHANGE UP (ref 0–2)
EOSINOPHIL # BLD AUTO: 0.27 K/UL — SIGNIFICANT CHANGE UP (ref 0–0.5)
EOSINOPHIL NFR BLD AUTO: 6.9 % — HIGH (ref 0–6)
HCT VFR BLD CALC: 41.5 % — SIGNIFICANT CHANGE UP (ref 39–50)
HGB BLD-MCNC: 14 G/DL — SIGNIFICANT CHANGE UP (ref 13–17)
IMM GRANULOCYTES NFR BLD AUTO: 0.3 % — SIGNIFICANT CHANGE UP (ref 0–0.9)
LYMPHOCYTES # BLD AUTO: 0.77 K/UL — LOW (ref 1–3.3)
LYMPHOCYTES # BLD AUTO: 19.7 % — SIGNIFICANT CHANGE UP (ref 13–44)
MCHC RBC-ENTMCNC: 29.4 PG — SIGNIFICANT CHANGE UP (ref 27–34)
MCHC RBC-ENTMCNC: 33.7 G/DL — SIGNIFICANT CHANGE UP (ref 32–36)
MCV RBC AUTO: 87.2 FL — SIGNIFICANT CHANGE UP (ref 80–100)
MONOCYTES # BLD AUTO: 0.51 K/UL — SIGNIFICANT CHANGE UP (ref 0–0.9)
MONOCYTES NFR BLD AUTO: 13 % — SIGNIFICANT CHANGE UP (ref 2–14)
NEUTROPHILS # BLD AUTO: 2.32 K/UL — SIGNIFICANT CHANGE UP (ref 1.8–7.4)
NEUTROPHILS NFR BLD AUTO: 59.3 % — SIGNIFICANT CHANGE UP (ref 43–77)
NRBC # BLD: 0 /100 WBCS — SIGNIFICANT CHANGE UP (ref 0–0)
PLATELET # BLD AUTO: 196 K/UL — SIGNIFICANT CHANGE UP (ref 150–400)
RBC # BLD: 4.76 M/UL — SIGNIFICANT CHANGE UP (ref 4.2–5.8)
RBC # FLD: 13.8 % — SIGNIFICANT CHANGE UP (ref 10.3–14.5)
WBC # BLD: 3.91 K/UL — SIGNIFICANT CHANGE UP (ref 3.8–10.5)
WBC # FLD AUTO: 3.91 K/UL — SIGNIFICANT CHANGE UP (ref 3.8–10.5)

## 2023-05-03 PROCEDURE — 99214 OFFICE O/P EST MOD 30 MIN: CPT

## 2023-05-03 RX ORDER — TAMSULOSIN HYDROCHLORIDE 0.4 MG/1
CAPSULE ORAL
Refills: 0 | Status: ACTIVE | COMMUNITY

## 2023-05-03 NOTE — PHYSICAL EXAM
[Ambulatory and capable of all self care but unable to carry out any work activities] : Status 2- Ambulatory and capable of all self care but unable to carry out any work activities. Up and about more than 50% of waking hours [Thin] : thin [Normal] : affect appropriate [de-identified] : No icterus [de-identified] : JEANNETTE  [de-identified] : Post-treatment changes to Left Neck [de-identified] : Clear [de-identified] : S1 S2 [de-identified] : No edema [de-identified] : Soft NT  [de-identified] : No spine/CVA tenderness [de-identified] : Ambulatory

## 2023-05-03 NOTE — RESULTS/DATA
[FreeTextEntry1] : -CT Neck 11/3/21:  Stable follow-up CT examination without evidence of residual or recurrent tumor in the left parotidectomy bed.  Unchanged lytic lucency within the left side of the mandible of uncertain etiology. It demonstrated FDG uptake in the past. Additional carious left-sided maxillary second molar tooth. Correlate with dental examination.  No new or enlarging cervical lymph nodes.  Worsening left-sided mastoid air cell effusion.\par \par -CT Chest 11/3/21:  New and enlarging left pulmonary metastases.  Enlarging right axillary chest wall metastasis.\par \par -PET/CT 12/15/21:  Compared to FDG-PET/CT scan dated 5/28/2019:\par 1. Non-FDG-avid postsurgical changes of left sided parotidectomy and flap reconstruction and left neck dissection, unchanged. Resolution of ill-defined, mild FDG activity in parotid bed.\par 2. New, intense FDG activity in left greater than right temporalis muscle and right greater than left masseter and pterygoid muscles likely is related to bruxism. Please correlate clinically. A new focus of increased FDG activity inferior, and medial to left mastoid (SUV 6.6; image 47 of dedicated head and neck series), without definite corresponding abnormality on CT, may be physiologic activity.\par 3. Resolution of FDG activity in molar region of left mandible. Previously seen corresponding lucency in left mandibular alveolus is decreased in size (image 61).\par 4. New small FDG-avid left lower lobe pulmonary nodule, better delineated on recent diagnostic CT, is indeterminate. Comparison with recent diagnostic CT is limited due to differences in technique. Differential diagnosis includes inflammatory/infectious and metastatic disease. Additional subcentimeter nodule seen on recent diagnostic CT are not well evaluated due to CT technique, and are too small to characterize with PET. Nonspecific, tiny focus of mild FDG activity in left upper lobe is unchanged.\par 5. Focus of mild FDG activity in left perihilar region is significantly decreased in metabolism (SUV 2.9; image 86; previous SUV 6.0). Resolution of additional previously seen FDG-avid foci in bilateral hilar regions.\par 6. Nonspecific small, mildly FDG-avid focus of skin thickening in right axillary region, new as compared to prior PET/CT, and corresponding to location of 2 cm peripherally enhancing hypodense cutaneous lesion seen on CT dated 11/3/2021 (SUV 1.7; image 70), likely resolving inflammatory process.\par \par -CT Chest 4/2/22:  Stable pulmonary nodules.\par \par – CT Neck 7/12/22:  Status post left parotidectomy. No recurrent mass or abnormal enhancement is identified. No cervical lymphadenopathy.  \par \par -CT C/A/P 7/12/22:  Numerous left lung/pleural metastases, several of which have mildly enlarged (by 1-2 mm) since April 2022.  No metastatic disease in the abdomen or pelvis.\par \par –CT Chest 10/3/22:  Several left lung and pleural metastases, some unchanged and others slightly larger.\par \par –CT Chest 3/9/23:  Status post wedge resection in the left lower lobe.  Multiple nodules are noted in the left lower lobe as well as involving the left major fissure. They have increased in size when compared to previous exam.\par \par

## 2023-05-03 NOTE — ASSESSMENT
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  Pathologically proven metastatic disease with pleural and lung metastases in May 2022.  The patient's functional status has been borderline.  Restaging CT March 2023 with continued increase in lung metastases.    Given the continued progression of his lung metastases and given the AR positivity of his tumor, started hormonal manipulation with a combination of leuprolide 11.25 mg IM every 3 months with bicalutamide 50 mg p.o. daily in April 2023.  He has a borderline functional status and therefore therapy targeting his AR receptor positivity was chosen.  \par Recommend: \par -Repeat labs today\par -Continue with planned first line therapy\par -Plan to obtain a restaging scan after about 4 months of therapy to assess response\par -Alternate systemic therapy options would include HER2 directed therapy such as trastuzumab in combination with chemotherapy (carboplatin and paclitaxel).  He is not a great candidate for chemotherapy given his functional status and there is now a global shortage of Carboplatin, further supporting choice of first line therapy \par -F/U prior to Leuprolide Dose #2 in early July or sooner should problems arise

## 2023-05-03 NOTE — HISTORY OF PRESENT ILLNESS
[Disease: _____________________] : Disease: [unfilled] [AJCC Stage: ____] : AJCC Stage: [unfilled] [de-identified] : Patient is status post left parotidectomy and neck dissection with reconstruction in April 2019 for a salivary duct carcinoma.  He was treated with adjuvant RT completed August 2019.  He has been followed with surveillance scans.  A CT chest in November 2021 revealed left-sided pulmonary nodules concerning for metastases.  PET/CT performed in December 2021 revealed the lung nodules to be indeterminant based on size; a right axillary focus was felt to be related to an inflammatory process.  Patient's family notes a right axillary abscess that was treated and improved following antibiotic therapy around this time.  Based on the scan findings, he was referred to thoracic surgery.  A follow-up CT chest in April 2022 revealed stable lung nodules however they were concerning for metastases.  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  Followed with surveillance scans.   [de-identified] : – Lung LLL wedge resection/pleural biopsy/diaphragmatic nodule biopsy 5/11/2022: Adenocarcinoma consistent with salivary duct carcinoma.\par IHC is positive for AR and HER2 positive at 3+. [de-identified] : *[employer]  utilized.  \par *Patient's spouse present.  \par \par Restaging CT from March 2023 revealed further progression in his lung metastases.  Patient was started on the combination of bicalutamide and is status post leuprolide IM 3-month depot on 4/12/2023.  He reports compliance with the bicalutamide.\par He reports a URI over the past week; reports he is gradually feeling better.  He reports fatigue and malaise.  Experienced a cough.  He has chronic dyspnea.

## 2023-05-04 LAB
ALBUMIN SERPL ELPH-MCNC: 4.8 G/DL
ALP BLD-CCNC: 113 U/L
ALT SERPL-CCNC: 29 U/L
ANION GAP SERPL CALC-SCNC: 14 MMOL/L
AST SERPL-CCNC: 28 U/L
BILIRUB SERPL-MCNC: 0.2 MG/DL
BUN SERPL-MCNC: 26 MG/DL
CALCIUM SERPL-MCNC: 9.5 MG/DL
CHLORIDE SERPL-SCNC: 101 MMOL/L
CO2 SERPL-SCNC: 21 MMOL/L
CREAT SERPL-MCNC: 1.21 MG/DL
EGFR: 63 ML/MIN/1.73M2
GLUCOSE SERPL-MCNC: 209 MG/DL
POTASSIUM SERPL-SCNC: 4.7 MMOL/L
PROT SERPL-MCNC: 7.5 G/DL
SODIUM SERPL-SCNC: 135 MMOL/L

## 2023-06-22 ENCOUNTER — OUTPATIENT (OUTPATIENT)
Dept: OUTPATIENT SERVICES | Facility: HOSPITAL | Age: 73
LOS: 1 days | Discharge: HOME | End: 2023-06-22

## 2023-06-22 DIAGNOSIS — D49.0 NEOPLASM OF UNSPECIFIED BEHAVIOR OF DIGESTIVE SYSTEM: Chronic | ICD-10-CM

## 2023-06-22 DIAGNOSIS — Z90.49 ACQUIRED ABSENCE OF OTHER SPECIFIED PARTS OF DIGESTIVE TRACT: Chronic | ICD-10-CM

## 2023-06-22 DIAGNOSIS — H26.9 UNSPECIFIED CATARACT: Chronic | ICD-10-CM

## 2023-06-22 DIAGNOSIS — Z95.0 PRESENCE OF CARDIAC PACEMAKER: Chronic | ICD-10-CM

## 2023-06-22 DIAGNOSIS — D02.20 CARCINOMA IN SITU OF UNSPECIFIED BRONCHUS AND LUNG: ICD-10-CM

## 2023-07-05 ENCOUNTER — RESULT REVIEW (OUTPATIENT)
Age: 73
End: 2023-07-05

## 2023-07-05 ENCOUNTER — APPOINTMENT (OUTPATIENT)
Dept: INFUSION THERAPY | Facility: HOSPITAL | Age: 73
End: 2023-07-05

## 2023-07-05 ENCOUNTER — APPOINTMENT (OUTPATIENT)
Dept: HEMATOLOGY ONCOLOGY | Facility: CLINIC | Age: 73
End: 2023-07-05
Payer: MEDICARE

## 2023-07-05 VITALS
RESPIRATION RATE: 16 BRPM | TEMPERATURE: 97.5 F | OXYGEN SATURATION: 99 % | WEIGHT: 123.46 LBS | SYSTOLIC BLOOD PRESSURE: 137 MMHG | HEART RATE: 80 BPM | DIASTOLIC BLOOD PRESSURE: 74 MMHG | BODY MASS INDEX: 22.58 KG/M2

## 2023-07-05 LAB
BASOPHILS # BLD AUTO: 0.07 K/UL — SIGNIFICANT CHANGE UP (ref 0–0.2)
BASOPHILS NFR BLD AUTO: 1.3 % — SIGNIFICANT CHANGE UP (ref 0–2)
EOSINOPHIL # BLD AUTO: 0.3 K/UL — SIGNIFICANT CHANGE UP (ref 0–0.5)
EOSINOPHIL NFR BLD AUTO: 5.7 % — SIGNIFICANT CHANGE UP (ref 0–6)
HCT VFR BLD CALC: 33.5 % — LOW (ref 39–50)
HGB BLD-MCNC: 11.7 G/DL — LOW (ref 13–17)
IMM GRANULOCYTES NFR BLD AUTO: 0.2 % — SIGNIFICANT CHANGE UP (ref 0–0.9)
LYMPHOCYTES # BLD AUTO: 0.71 K/UL — LOW (ref 1–3.3)
LYMPHOCYTES # BLD AUTO: 13.5 % — SIGNIFICANT CHANGE UP (ref 13–44)
MCHC RBC-ENTMCNC: 30.5 PG — SIGNIFICANT CHANGE UP (ref 27–34)
MCHC RBC-ENTMCNC: 34.9 G/DL — SIGNIFICANT CHANGE UP (ref 32–36)
MCV RBC AUTO: 87.5 FL — SIGNIFICANT CHANGE UP (ref 80–100)
MONOCYTES # BLD AUTO: 0.59 K/UL — SIGNIFICANT CHANGE UP (ref 0–0.9)
MONOCYTES NFR BLD AUTO: 11.3 % — SIGNIFICANT CHANGE UP (ref 2–14)
NEUTROPHILS # BLD AUTO: 3.56 K/UL — SIGNIFICANT CHANGE UP (ref 1.8–7.4)
NEUTROPHILS NFR BLD AUTO: 68 % — SIGNIFICANT CHANGE UP (ref 43–77)
NRBC # BLD: 0 /100 WBCS — SIGNIFICANT CHANGE UP (ref 0–0)
PLATELET # BLD AUTO: 195 K/UL — SIGNIFICANT CHANGE UP (ref 150–400)
RBC # BLD: 3.83 M/UL — LOW (ref 4.2–5.8)
RBC # FLD: 12.6 % — SIGNIFICANT CHANGE UP (ref 10.3–14.5)
WBC # BLD: 5.24 K/UL — SIGNIFICANT CHANGE UP (ref 3.8–10.5)
WBC # FLD AUTO: 5.24 K/UL — SIGNIFICANT CHANGE UP (ref 3.8–10.5)

## 2023-07-05 PROCEDURE — 99214 OFFICE O/P EST MOD 30 MIN: CPT

## 2023-07-06 ENCOUNTER — NON-APPOINTMENT (OUTPATIENT)
Age: 73
End: 2023-07-06

## 2023-07-06 DIAGNOSIS — C08.9 MALIGNANT NEOPLASM OF MAJOR SALIVARY GLAND, UNSPECIFIED: ICD-10-CM

## 2023-07-06 DIAGNOSIS — Z51.11 ENCOUNTER FOR ANTINEOPLASTIC CHEMOTHERAPY: ICD-10-CM

## 2023-07-06 LAB
ALBUMIN SERPL ELPH-MCNC: 4.9 G/DL
ALP BLD-CCNC: 88 U/L
ALT SERPL-CCNC: 24 U/L
ANION GAP SERPL CALC-SCNC: 15 MMOL/L
AST SERPL-CCNC: 29 U/L
BILIRUB SERPL-MCNC: 0.5 MG/DL
BUN SERPL-MCNC: 27 MG/DL
CALCIUM SERPL-MCNC: 9.8 MG/DL
CHLORIDE SERPL-SCNC: 102 MMOL/L
CO2 SERPL-SCNC: 20 MMOL/L
CREAT SERPL-MCNC: 1.18 MG/DL
EGFR: 65 ML/MIN/1.73M2
GLUCOSE SERPL-MCNC: 165 MG/DL
POTASSIUM SERPL-SCNC: 5 MMOL/L
PROT SERPL-MCNC: 6.9 G/DL
SODIUM SERPL-SCNC: 138 MMOL/L

## 2023-07-06 NOTE — PHYSICAL EXAM
[Ambulatory and capable of all self care but unable to carry out any work activities] : Status 2- Ambulatory and capable of all self care but unable to carry out any work activities. Up and about more than 50% of waking hours [Thin] : thin [Normal] : affect appropriate [de-identified] : No icterus [de-identified] : JEANNETTE  [de-identified] : Post-treatment changes to Left Neck [de-identified] : Clear [de-identified] : S1 S2 [de-identified] : No edema [de-identified] : Softly distended NT, No masses  [de-identified] : No spine/CVA tenderness [de-identified] : Ambulatory

## 2023-07-06 NOTE — ASSESSMENT
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  Pathologically proven metastatic disease with pleural and lung metastases in May 2022.  The patient's functional status has been borderline.  Restaging CT March 2023 with continued increase in lung metastases.    Given the continued progression of his lung metastases and given the AR positivity of his tumor, started hormonal manipulation with a combination of leuprolide 11.25 mg IM every 3 months with bicalutamide 50 mg p.o. daily in April 2023.  He has a borderline functional status and therefore therapy targeting his AR receptor positivity was chosen.  \par Recommend: \par -Repeat labs today\par -Continue with planned first line therapy:  daily bicalutimide and q3 month IM leuprolide\par -F/U in mid-August with restaging CT Chest obtained beforehand to assess response\par -Alternate systemic therapy options would include HER2 directed therapy such as trastuzumab in combination with chemotherapy (carboplatin and paclitaxel).  He is not a great candidate for chemotherapy given his functional status and there is now a global shortage of Carboplatin, further supporting choice of first line therapy \par – Check out form given to patient with instructions for making appointments

## 2023-07-06 NOTE — HISTORY OF PRESENT ILLNESS
[Disease: _____________________] : Disease: [unfilled] [AJCC Stage: ____] : AJCC Stage: [unfilled] [de-identified] : Patient is status post left parotidectomy and neck dissection with reconstruction in April 2019 for a salivary duct carcinoma.  He was treated with adjuvant RT completed August 2019.  He has been followed with surveillance scans.  A CT chest in November 2021 revealed left-sided pulmonary nodules concerning for metastases.  PET/CT performed in December 2021 revealed the lung nodules to be indeterminant based on size; a right axillary focus was felt to be related to an inflammatory process.  Patient's family notes a right axillary abscess that was treated and improved following antibiotic therapy around this time.  Based on the scan findings, he was referred to thoracic surgery.  A follow-up CT chest in April 2022 revealed stable lung nodules however they were concerning for metastases.  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  Followed with surveillance scans.  Restaging CT March 2023 with continued increase in lung metastases.    Given the continued progression of his lung metastases and given the AR positivity of his tumor, started hormonal manipulation with a combination of leuprolide 11.25 mg IM every 3 months with bicalutamide 50 mg p.o. daily in April 2023.  He has a borderline functional status and therefore therapy targeting his AR receptor positivity was chosen.   [de-identified] : – Lung LLL wedge resection/pleural biopsy/diaphragmatic nodule biopsy 5/11/2022: Adenocarcinoma consistent with salivary duct carcinoma.\par IHC is positive for AR and HER2 positive at 3+. [de-identified] : *[employer]  #[id number] utilized.  \par *Patient's spouse present.  \par \par Patient started first-line combination leuprolide and bicalutamide targeting his AR receptor positivity in April 2023.\par He overall reports feeling weakness.  He spends much of his day resting and has a borderline functional status.  He reports continued malaise and fatigue.  Denies hot flashes.  He has a baseline cough and chronic dyspnea.\par Patient's wife informs me that his PCP would like to speak with me and was reportedly concerned that he is on medications that are utilized for prostate cancer; reassurance provided and discussed that these therapies are also used in the appropriate situation for salivary gland cancers as well.

## 2023-08-11 ENCOUNTER — OUTPATIENT (OUTPATIENT)
Dept: OUTPATIENT SERVICES | Facility: HOSPITAL | Age: 73
LOS: 1 days | End: 2023-08-11
Payer: COMMERCIAL

## 2023-08-11 ENCOUNTER — APPOINTMENT (OUTPATIENT)
Dept: CT IMAGING | Facility: IMAGING CENTER | Age: 73
End: 2023-08-11
Payer: MEDICARE

## 2023-08-11 DIAGNOSIS — H26.9 UNSPECIFIED CATARACT: Chronic | ICD-10-CM

## 2023-08-11 DIAGNOSIS — D49.0 NEOPLASM OF UNSPECIFIED BEHAVIOR OF DIGESTIVE SYSTEM: Chronic | ICD-10-CM

## 2023-08-11 DIAGNOSIS — Z90.49 ACQUIRED ABSENCE OF OTHER SPECIFIED PARTS OF DIGESTIVE TRACT: Chronic | ICD-10-CM

## 2023-08-11 DIAGNOSIS — C08.9 MALIGNANT NEOPLASM OF MAJOR SALIVARY GLAND, UNSPECIFIED: ICD-10-CM

## 2023-08-11 DIAGNOSIS — Z95.0 PRESENCE OF CARDIAC PACEMAKER: Chronic | ICD-10-CM

## 2023-08-11 PROCEDURE — 71250 CT THORAX DX C-: CPT

## 2023-08-11 PROCEDURE — 71250 CT THORAX DX C-: CPT | Mod: 26

## 2023-08-15 ENCOUNTER — APPOINTMENT (OUTPATIENT)
Dept: HEMATOLOGY ONCOLOGY | Facility: CLINIC | Age: 73
End: 2023-08-15
Payer: MEDICARE

## 2023-08-15 ENCOUNTER — RESULT REVIEW (OUTPATIENT)
Age: 73
End: 2023-08-15

## 2023-08-15 VITALS
SYSTOLIC BLOOD PRESSURE: 134 MMHG | BODY MASS INDEX: 22.18 KG/M2 | WEIGHT: 121.25 LBS | RESPIRATION RATE: 16 BRPM | TEMPERATURE: 97.2 F | OXYGEN SATURATION: 99 % | HEART RATE: 74 BPM | DIASTOLIC BLOOD PRESSURE: 75 MMHG

## 2023-08-15 LAB
BASOPHILS # BLD AUTO: 0.08 K/UL — SIGNIFICANT CHANGE UP (ref 0–0.2)
BASOPHILS NFR BLD AUTO: 1.3 % — SIGNIFICANT CHANGE UP (ref 0–2)
EOSINOPHIL # BLD AUTO: 0.57 K/UL — HIGH (ref 0–0.5)
EOSINOPHIL NFR BLD AUTO: 8.9 % — HIGH (ref 0–6)
HCT VFR BLD CALC: 37.1 % — LOW (ref 39–50)
HGB BLD-MCNC: 12.8 G/DL — LOW (ref 13–17)
IMM GRANULOCYTES NFR BLD AUTO: 0.6 % — SIGNIFICANT CHANGE UP (ref 0–0.9)
LYMPHOCYTES # BLD AUTO: 0.8 K/UL — LOW (ref 1–3.3)
LYMPHOCYTES # BLD AUTO: 12.6 % — LOW (ref 13–44)
MCHC RBC-ENTMCNC: 30.7 PG — SIGNIFICANT CHANGE UP (ref 27–34)
MCHC RBC-ENTMCNC: 34.5 G/DL — SIGNIFICANT CHANGE UP (ref 32–36)
MCV RBC AUTO: 89 FL — SIGNIFICANT CHANGE UP (ref 80–100)
MONOCYTES # BLD AUTO: 0.67 K/UL — SIGNIFICANT CHANGE UP (ref 0–0.9)
MONOCYTES NFR BLD AUTO: 10.5 % — SIGNIFICANT CHANGE UP (ref 2–14)
NEUTROPHILS # BLD AUTO: 4.21 K/UL — SIGNIFICANT CHANGE UP (ref 1.8–7.4)
NEUTROPHILS NFR BLD AUTO: 66.1 % — SIGNIFICANT CHANGE UP (ref 43–77)
NRBC # BLD: 0 /100 WBCS — SIGNIFICANT CHANGE UP (ref 0–0)
PLATELET # BLD AUTO: 208 K/UL — SIGNIFICANT CHANGE UP (ref 150–400)
RBC # BLD: 4.17 M/UL — LOW (ref 4.2–5.8)
RBC # FLD: 12.3 % — SIGNIFICANT CHANGE UP (ref 10.3–14.5)
WBC # BLD: 6.37 K/UL — SIGNIFICANT CHANGE UP (ref 3.8–10.5)
WBC # FLD AUTO: 6.37 K/UL — SIGNIFICANT CHANGE UP (ref 3.8–10.5)

## 2023-08-15 PROCEDURE — 99214 OFFICE O/P EST MOD 30 MIN: CPT

## 2023-08-16 LAB
ALBUMIN SERPL ELPH-MCNC: 5.2 G/DL
ALP BLD-CCNC: 110 U/L
ALT SERPL-CCNC: 21 U/L
ANION GAP SERPL CALC-SCNC: 13 MMOL/L
AST SERPL-CCNC: 23 U/L
BILIRUB SERPL-MCNC: 0.4 MG/DL
BUN SERPL-MCNC: 25 MG/DL
CALCIUM SERPL-MCNC: 10.4 MG/DL
CHLORIDE SERPL-SCNC: 97 MMOL/L
CO2 SERPL-SCNC: 22 MMOL/L
CREAT SERPL-MCNC: 1.24 MG/DL
EGFR: 61 ML/MIN/1.73M2
GLUCOSE SERPL-MCNC: 225 MG/DL
POTASSIUM SERPL-SCNC: 5.7 MMOL/L
PROT SERPL-MCNC: 8 G/DL
SODIUM SERPL-SCNC: 132 MMOL/L

## 2023-08-17 NOTE — PHYSICAL EXAM
[Ambulatory and capable of all self care but unable to carry out any work activities] : Status 2- Ambulatory and capable of all self care but unable to carry out any work activities. Up and about more than 50% of waking hours [Thin] : thin [Normal] : affect appropriate [de-identified] : No icterus [de-identified] : JEANNETTE  [de-identified] : Post-treatment changes to Left Neck [de-identified] : S1 S2 [de-identified] : Clear [de-identified] : No edema [de-identified] : Softly distended NT, No masses  [de-identified] : No spine/CVA tenderness [de-identified] : Ambulatory

## 2023-08-17 NOTE — HISTORY OF PRESENT ILLNESS
[Disease: _____________________] : Disease: [unfilled] [AJCC Stage: ____] : AJCC Stage: [unfilled] [de-identified] : Patient is status post left parotidectomy and neck dissection with reconstruction in April 2019 for a salivary duct carcinoma.  He was treated with adjuvant RT completed August 2019.  He has been followed with surveillance scans.  A CT chest in November 2021 revealed left-sided pulmonary nodules concerning for metastases.  PET/CT performed in December 2021 revealed the lung nodules to be indeterminant based on size; a right axillary focus was felt to be related to an inflammatory process.  Patient's family notes a right axillary abscess that was treated and improved following antibiotic therapy around this time.  Based on the scan findings, he was referred to thoracic surgery.  A follow-up CT chest in April 2022 revealed stable lung nodules however they were concerning for metastases.  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  Followed with surveillance scans.  Restaging CT March 2023 with continued increase in lung metastases.    Given the continued progression of his lung metastases and given the AR positivity of his tumor, started hormonal manipulation with a combination of leuprolide 11.25 mg IM every 3 months with bicalutamide 50 mg p.o. daily in April 2023.  He has a borderline functional status and therefore therapy targeting his AR receptor positivity was chosen.  Achieved ID.  [de-identified] : - Lung LLL wedge resection/pleural biopsy/diaphragmatic nodule biopsy 5/11/2022: Adenocarcinoma consistent with salivary duct carcinoma.\par  IHC is positive for AR and HER2 positive at 3+. [de-identified] : *[employer] Video  #[id number] utilized.   *Patient's spouse present.    Patient started first-line combination leuprolide and bicalutamide targeting his AR receptor positivity in April 2023. He continues to report feeling weakness.  He spends much of his day resting and has a borderline functional status.  He has overall malaise and fatigue.  He complains of constipation; utilizing Colace and Dulcolax.  Recommended incorporating OTC MiraLAX.  He complains of his abdomen feeling bloated.  Restaging CT chest with [facility] to interval therapy.

## 2023-08-17 NOTE — ASSESSMENT
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  Pathologically proven metastatic disease with pleural and lung metastases in May 2022.  The patient's functional status has been borderline.  Restaging CT March 2023 with continued increase in lung metastases.    Given the continued progression of his lung metastases and given the AR positivity of his tumor, started hormonal manipulation with a combination of leuprolide 11.25 mg IM every 3 months with bicalutamide 50 mg p.o. daily in April 2023.  He has a borderline functional status and therefore therapy targeting his AR receptor positivity was chosen.   Restaging CT Chest Aug 2023 with AL to therapy.   Recommend:  -Repeat labs today -Continue with first line therapy for as long as it benefits the patient:  daily bicalutimide and q3 month IM leuprolide -F/U on day of Leuprolide injections going forward with next visit in late Sept  -Alternate systemic therapy options would include HER2 directed therapy such as trastuzumab in combination with chemotherapy (carboplatin and paclitaxel).  He is not a great candidate for chemotherapy given his functional status  -Can plan on obtaining his next restaging scan prior to his December f/u visit:  this can be a CT Chest/Abdomen/Pelvis   - Check out form given to patient with instructions for making appointments

## 2023-09-19 ENCOUNTER — OUTPATIENT (OUTPATIENT)
Dept: OUTPATIENT SERVICES | Facility: HOSPITAL | Age: 73
LOS: 1 days | Discharge: HOME | End: 2023-09-19

## 2023-09-19 DIAGNOSIS — D02.20 CARCINOMA IN SITU OF UNSPECIFIED BRONCHUS AND LUNG: ICD-10-CM

## 2023-09-19 DIAGNOSIS — Z95.0 PRESENCE OF CARDIAC PACEMAKER: Chronic | ICD-10-CM

## 2023-09-19 DIAGNOSIS — D49.0 NEOPLASM OF UNSPECIFIED BEHAVIOR OF DIGESTIVE SYSTEM: Chronic | ICD-10-CM

## 2023-09-19 DIAGNOSIS — H26.9 UNSPECIFIED CATARACT: Chronic | ICD-10-CM

## 2023-09-19 DIAGNOSIS — Z90.49 ACQUIRED ABSENCE OF OTHER SPECIFIED PARTS OF DIGESTIVE TRACT: Chronic | ICD-10-CM

## 2023-09-27 ENCOUNTER — APPOINTMENT (OUTPATIENT)
Dept: HEMATOLOGY ONCOLOGY | Facility: CLINIC | Age: 73
End: 2023-09-27
Payer: MEDICARE

## 2023-09-27 ENCOUNTER — RESULT REVIEW (OUTPATIENT)
Age: 73
End: 2023-09-27

## 2023-09-27 ENCOUNTER — APPOINTMENT (OUTPATIENT)
Dept: INFUSION THERAPY | Facility: HOSPITAL | Age: 73
End: 2023-09-27

## 2023-09-27 VITALS
RESPIRATION RATE: 16 BRPM | HEART RATE: 63 BPM | TEMPERATURE: 96.6 F | SYSTOLIC BLOOD PRESSURE: 126 MMHG | WEIGHT: 123.46 LBS | DIASTOLIC BLOOD PRESSURE: 74 MMHG | BODY MASS INDEX: 22.58 KG/M2 | OXYGEN SATURATION: 99 %

## 2023-09-27 LAB
BASOPHILS # BLD AUTO: 0.06 K/UL — SIGNIFICANT CHANGE UP (ref 0–0.2)
BASOPHILS NFR BLD AUTO: 1 % — SIGNIFICANT CHANGE UP (ref 0–2)
EOSINOPHIL # BLD AUTO: 0.34 K/UL — SIGNIFICANT CHANGE UP (ref 0–0.5)
EOSINOPHIL NFR BLD AUTO: 5.8 % — SIGNIFICANT CHANGE UP (ref 0–6)
HCT VFR BLD CALC: 35.5 % — LOW (ref 39–50)
HGB BLD-MCNC: 12.6 G/DL — LOW (ref 13–17)
IMM GRANULOCYTES NFR BLD AUTO: 0.3 % — SIGNIFICANT CHANGE UP (ref 0–0.9)
LYMPHOCYTES # BLD AUTO: 0.76 K/UL — LOW (ref 1–3.3)
LYMPHOCYTES # BLD AUTO: 13 % — SIGNIFICANT CHANGE UP (ref 13–44)
MCHC RBC-ENTMCNC: 31.2 PG — SIGNIFICANT CHANGE UP (ref 27–34)
MCHC RBC-ENTMCNC: 35.5 G/DL — SIGNIFICANT CHANGE UP (ref 32–36)
MCV RBC AUTO: 87.9 FL — SIGNIFICANT CHANGE UP (ref 80–100)
MONOCYTES # BLD AUTO: 0.71 K/UL — SIGNIFICANT CHANGE UP (ref 0–0.9)
MONOCYTES NFR BLD AUTO: 12.2 % — SIGNIFICANT CHANGE UP (ref 2–14)
NEUTROPHILS # BLD AUTO: 3.94 K/UL — SIGNIFICANT CHANGE UP (ref 1.8–7.4)
NEUTROPHILS NFR BLD AUTO: 67.7 % — SIGNIFICANT CHANGE UP (ref 43–77)
NRBC # BLD: 0 /100 WBCS — SIGNIFICANT CHANGE UP (ref 0–0)
PLATELET # BLD AUTO: 205 K/UL — SIGNIFICANT CHANGE UP (ref 150–400)
RBC # BLD: 4.04 M/UL — LOW (ref 4.2–5.8)
RBC # FLD: 11.6 % — SIGNIFICANT CHANGE UP (ref 10.3–14.5)
WBC # BLD: 5.83 K/UL — SIGNIFICANT CHANGE UP (ref 3.8–10.5)
WBC # FLD AUTO: 5.83 K/UL — SIGNIFICANT CHANGE UP (ref 3.8–10.5)

## 2023-09-27 PROCEDURE — 99214 OFFICE O/P EST MOD 30 MIN: CPT

## 2023-09-28 DIAGNOSIS — C08.9 MALIGNANT NEOPLASM OF MAJOR SALIVARY GLAND, UNSPECIFIED: ICD-10-CM

## 2023-09-28 LAB
ALBUMIN SERPL ELPH-MCNC: 4.9 G/DL
ALP BLD-CCNC: 115 U/L
ALT SERPL-CCNC: 21 U/L
ANION GAP SERPL CALC-SCNC: 14 MMOL/L
AST SERPL-CCNC: 23 U/L
BILIRUB SERPL-MCNC: 0.4 MG/DL
BUN SERPL-MCNC: 22 MG/DL
CALCIUM SERPL-MCNC: 9.9 MG/DL
CHLORIDE SERPL-SCNC: 100 MMOL/L
CO2 SERPL-SCNC: 22 MMOL/L
CREAT SERPL-MCNC: 1.14 MG/DL
EGFR: 67 ML/MIN/1.73M2
GLUCOSE SERPL-MCNC: 153 MG/DL
POTASSIUM SERPL-SCNC: 4.9 MMOL/L
PROT SERPL-MCNC: 7.5 G/DL
SODIUM SERPL-SCNC: 136 MMOL/L

## 2023-10-06 NOTE — PHYSICAL EXAM
Erin Mattson has been opened with insurance to return to [facility]. PLAN:  Return to [facility] once insurance approval is received as well as medical clearance. / to remain available for support and/or discharge planning.       Austin Farah MBA BSN RN CRRN   RN Case Manager  [phone] [Midline] : trachea located in midline position [Normal] : no rashes [de-identified] : INcisions C/D/I. Flap viable. No signs of infection.

## 2023-11-27 ENCOUNTER — OUTPATIENT (OUTPATIENT)
Dept: OUTPATIENT SERVICES | Facility: HOSPITAL | Age: 73
LOS: 1 days | Discharge: HOME | End: 2023-11-27

## 2023-11-27 DIAGNOSIS — D49.0 NEOPLASM OF UNSPECIFIED BEHAVIOR OF DIGESTIVE SYSTEM: Chronic | ICD-10-CM

## 2023-11-27 DIAGNOSIS — H26.9 UNSPECIFIED CATARACT: Chronic | ICD-10-CM

## 2023-11-27 DIAGNOSIS — Z95.0 PRESENCE OF CARDIAC PACEMAKER: Chronic | ICD-10-CM

## 2023-11-27 DIAGNOSIS — C08.9 MALIGNANT NEOPLASM OF MAJOR SALIVARY GLAND, UNSPECIFIED: ICD-10-CM

## 2023-11-27 DIAGNOSIS — Z90.49 ACQUIRED ABSENCE OF OTHER SPECIFIED PARTS OF DIGESTIVE TRACT: Chronic | ICD-10-CM

## 2023-12-04 ENCOUNTER — RESULT REVIEW (OUTPATIENT)
Age: 73
End: 2023-12-04

## 2023-12-08 ENCOUNTER — OUTPATIENT (OUTPATIENT)
Dept: OUTPATIENT SERVICES | Facility: HOSPITAL | Age: 73
LOS: 1 days | End: 2023-12-08
Payer: COMMERCIAL

## 2023-12-08 ENCOUNTER — APPOINTMENT (OUTPATIENT)
Dept: CT IMAGING | Facility: IMAGING CENTER | Age: 73
End: 2023-12-08
Payer: MEDICARE

## 2023-12-08 DIAGNOSIS — D49.0 NEOPLASM OF UNSPECIFIED BEHAVIOR OF DIGESTIVE SYSTEM: Chronic | ICD-10-CM

## 2023-12-08 DIAGNOSIS — Z90.49 ACQUIRED ABSENCE OF OTHER SPECIFIED PARTS OF DIGESTIVE TRACT: Chronic | ICD-10-CM

## 2023-12-08 DIAGNOSIS — Z95.0 PRESENCE OF CARDIAC PACEMAKER: Chronic | ICD-10-CM

## 2023-12-08 DIAGNOSIS — H26.9 UNSPECIFIED CATARACT: Chronic | ICD-10-CM

## 2023-12-08 DIAGNOSIS — C08.9 MALIGNANT NEOPLASM OF MAJOR SALIVARY GLAND, UNSPECIFIED: ICD-10-CM

## 2023-12-08 PROCEDURE — 74177 CT ABD & PELVIS W/CONTRAST: CPT | Mod: 26

## 2023-12-08 PROCEDURE — 74177 CT ABD & PELVIS W/CONTRAST: CPT

## 2023-12-08 PROCEDURE — 71260 CT THORAX DX C+: CPT | Mod: 26

## 2023-12-08 PROCEDURE — 71260 CT THORAX DX C+: CPT

## 2023-12-13 ENCOUNTER — RESULT REVIEW (OUTPATIENT)
Age: 73
End: 2023-12-13

## 2023-12-13 ENCOUNTER — APPOINTMENT (OUTPATIENT)
Dept: INFUSION THERAPY | Facility: HOSPITAL | Age: 73
End: 2023-12-13

## 2023-12-13 ENCOUNTER — APPOINTMENT (OUTPATIENT)
Dept: HEMATOLOGY ONCOLOGY | Facility: CLINIC | Age: 73
End: 2023-12-13
Payer: MEDICARE

## 2023-12-13 ENCOUNTER — NON-APPOINTMENT (OUTPATIENT)
Age: 73
End: 2023-12-13

## 2023-12-13 VITALS
OXYGEN SATURATION: 99 % | DIASTOLIC BLOOD PRESSURE: 77 MMHG | TEMPERATURE: 97.2 F | HEART RATE: 73 BPM | SYSTOLIC BLOOD PRESSURE: 152 MMHG | RESPIRATION RATE: 16 BRPM | BODY MASS INDEX: 22.22 KG/M2 | WEIGHT: 121.47 LBS

## 2023-12-13 LAB
BASOPHILS # BLD AUTO: 0.07 K/UL — SIGNIFICANT CHANGE UP (ref 0–0.2)
BASOPHILS # BLD AUTO: 0.07 K/UL — SIGNIFICANT CHANGE UP (ref 0–0.2)
BASOPHILS NFR BLD AUTO: 0.9 % — SIGNIFICANT CHANGE UP (ref 0–2)
BASOPHILS NFR BLD AUTO: 0.9 % — SIGNIFICANT CHANGE UP (ref 0–2)
EOSINOPHIL # BLD AUTO: 0.24 K/UL — SIGNIFICANT CHANGE UP (ref 0–0.5)
EOSINOPHIL # BLD AUTO: 0.24 K/UL — SIGNIFICANT CHANGE UP (ref 0–0.5)
EOSINOPHIL NFR BLD AUTO: 3 % — SIGNIFICANT CHANGE UP (ref 0–6)
EOSINOPHIL NFR BLD AUTO: 3 % — SIGNIFICANT CHANGE UP (ref 0–6)
HCT VFR BLD CALC: 36.7 % — LOW (ref 39–50)
HCT VFR BLD CALC: 36.7 % — LOW (ref 39–50)
HGB BLD-MCNC: 12.8 G/DL — LOW (ref 13–17)
HGB BLD-MCNC: 12.8 G/DL — LOW (ref 13–17)
IMM GRANULOCYTES NFR BLD AUTO: 0.4 % — SIGNIFICANT CHANGE UP (ref 0–0.9)
IMM GRANULOCYTES NFR BLD AUTO: 0.4 % — SIGNIFICANT CHANGE UP (ref 0–0.9)
LYMPHOCYTES # BLD AUTO: 0.99 K/UL — LOW (ref 1–3.3)
LYMPHOCYTES # BLD AUTO: 0.99 K/UL — LOW (ref 1–3.3)
LYMPHOCYTES # BLD AUTO: 12.5 % — LOW (ref 13–44)
LYMPHOCYTES # BLD AUTO: 12.5 % — LOW (ref 13–44)
MCHC RBC-ENTMCNC: 30.8 PG — SIGNIFICANT CHANGE UP (ref 27–34)
MCHC RBC-ENTMCNC: 30.8 PG — SIGNIFICANT CHANGE UP (ref 27–34)
MCHC RBC-ENTMCNC: 34.9 G/DL — SIGNIFICANT CHANGE UP (ref 32–36)
MCHC RBC-ENTMCNC: 34.9 G/DL — SIGNIFICANT CHANGE UP (ref 32–36)
MCV RBC AUTO: 88.2 FL — SIGNIFICANT CHANGE UP (ref 80–100)
MCV RBC AUTO: 88.2 FL — SIGNIFICANT CHANGE UP (ref 80–100)
MONOCYTES # BLD AUTO: 0.67 K/UL — SIGNIFICANT CHANGE UP (ref 0–0.9)
MONOCYTES # BLD AUTO: 0.67 K/UL — SIGNIFICANT CHANGE UP (ref 0–0.9)
MONOCYTES NFR BLD AUTO: 8.4 % — SIGNIFICANT CHANGE UP (ref 2–14)
MONOCYTES NFR BLD AUTO: 8.4 % — SIGNIFICANT CHANGE UP (ref 2–14)
NEUTROPHILS # BLD AUTO: 5.94 K/UL — SIGNIFICANT CHANGE UP (ref 1.8–7.4)
NEUTROPHILS # BLD AUTO: 5.94 K/UL — SIGNIFICANT CHANGE UP (ref 1.8–7.4)
NEUTROPHILS NFR BLD AUTO: 74.8 % — SIGNIFICANT CHANGE UP (ref 43–77)
NEUTROPHILS NFR BLD AUTO: 74.8 % — SIGNIFICANT CHANGE UP (ref 43–77)
NRBC # BLD: 0 /100 WBCS — SIGNIFICANT CHANGE UP (ref 0–0)
NRBC # BLD: 0 /100 WBCS — SIGNIFICANT CHANGE UP (ref 0–0)
PLATELET # BLD AUTO: 243 K/UL — SIGNIFICANT CHANGE UP (ref 150–400)
PLATELET # BLD AUTO: 243 K/UL — SIGNIFICANT CHANGE UP (ref 150–400)
RBC # BLD: 4.16 M/UL — LOW (ref 4.2–5.8)
RBC # BLD: 4.16 M/UL — LOW (ref 4.2–5.8)
RBC # FLD: 11.8 % — SIGNIFICANT CHANGE UP (ref 10.3–14.5)
RBC # FLD: 11.8 % — SIGNIFICANT CHANGE UP (ref 10.3–14.5)
WBC # BLD: 7.94 K/UL — SIGNIFICANT CHANGE UP (ref 3.8–10.5)
WBC # BLD: 7.94 K/UL — SIGNIFICANT CHANGE UP (ref 3.8–10.5)
WBC # FLD AUTO: 7.94 K/UL — SIGNIFICANT CHANGE UP (ref 3.8–10.5)
WBC # FLD AUTO: 7.94 K/UL — SIGNIFICANT CHANGE UP (ref 3.8–10.5)

## 2023-12-13 PROCEDURE — 99214 OFFICE O/P EST MOD 30 MIN: CPT

## 2023-12-14 DIAGNOSIS — Z51.11 ENCOUNTER FOR ANTINEOPLASTIC CHEMOTHERAPY: ICD-10-CM

## 2023-12-14 LAB
ALBUMIN SERPL ELPH-MCNC: 5 G/DL
ALP BLD-CCNC: 111 U/L
ALT SERPL-CCNC: 26 U/L
ANION GAP SERPL CALC-SCNC: 15 MMOL/L
AST SERPL-CCNC: 25 U/L
BILIRUB SERPL-MCNC: 0.4 MG/DL
BUN SERPL-MCNC: 19 MG/DL
CALCIUM SERPL-MCNC: 10.2 MG/DL
CHLORIDE SERPL-SCNC: 99 MMOL/L
CO2 SERPL-SCNC: 22 MMOL/L
CREAT SERPL-MCNC: 1.05 MG/DL
EGFR: 74 ML/MIN/1.73M2
GLUCOSE SERPL-MCNC: 194 MG/DL
POTASSIUM SERPL-SCNC: 4.7 MMOL/L
PROT SERPL-MCNC: 7.6 G/DL
SODIUM SERPL-SCNC: 137 MMOL/L

## 2023-12-14 NOTE — HISTORY OF PRESENT ILLNESS
[Disease: _____________________] : Disease: [unfilled] [AJCC Stage: ____] : AJCC Stage: [unfilled] [de-identified] : Patient is status post left parotidectomy and neck dissection with reconstruction in April 2019 for a salivary duct carcinoma.  He was treated with adjuvant RT completed August 2019.  He has been followed with surveillance scans.  A CT chest in November 2021 revealed left-sided pulmonary nodules concerning for metastases.  PET/CT performed in December 2021 revealed the lung nodules to be indeterminant based on size; a right axillary focus was felt to be related to an inflammatory process.  Patient's family notes a right axillary abscess that was treated and improved following antibiotic therapy around this time.  Based on the scan findings, he was referred to thoracic surgery.  A follow-up CT chest in April 2022 revealed stable lung nodules however they were concerning for metastases.  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  Followed with surveillance scans.  Restaging CT March 2023 with continued increase in lung metastases.    Given the continued progression of his lung metastases and given the AR positivity of his tumor, started hormonal manipulation with a combination of leuprolide 11.25 mg IM every 3 months with bicalutamide 50 mg p.o. daily in April 2023.  He has a borderline functional status and therefore therapy targeting his AR receptor positivity was chosen.  Achieved AL.  [de-identified] : - Lung LLL wedge resection/pleural biopsy/diaphragmatic nodule biopsy 5/11/2022: Adenocarcinoma consistent with salivary duct carcinoma.\par  IHC is positive for AR and HER2 positive at 3+. [de-identified] : *[facility] Video  utilized.   *Patient's spouse present.   Patient started first-line combination leuprolide and bicalutamide targeting his AR receptor positivity in April 2023.  Achieved AR.   Patient has ongoing fatigue and weakness.  He is able to ambulate and carry out ADLs.  He does spend a significant mount of his day resting.  Constipation is managed with Colace and Dulcolax.  He has not gone for a colonoscopy that was recommended by his PCP.  Restaging CT with sustained response.

## 2023-12-14 NOTE — PHYSICAL EXAM
[Ambulatory and capable of all self care but unable to carry out any work activities] : Status 2- Ambulatory and capable of all self care but unable to carry out any work activities. Up and about more than 50% of waking hours [Thin] : thin [Normal] : affect appropriate [de-identified] : No icterus [de-identified] : JEANNETTE  [de-identified] : Post-treatment changes to Left Neck [de-identified] : Clear [de-identified] : S1 S2 [de-identified] : No edema [de-identified] : Softly distended NT, No masses  [de-identified] : No spine/CVA tenderness [de-identified] : Ambulatory

## 2023-12-14 NOTE — ASSESSMENT
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 Pathologically proven metastatic disease with pleural and lung metastases in May 2022. The patient's functional status has been borderline. Restaging CT March 2023 with continued increase in lung metastases. Given the continued progression of his lung metastases and given the AR positivity of his tumor, started hormonal manipulation with a combination of leuprolide 11.25 mg IM every 3 months with bicalutamide 50 mg p.o. daily in April 2023. He has a borderline functional status and therefore therapy targeting his AR receptor positivity was chosen.  Achieved NH.   Restaging CT C/A/P Dec 2023 with sustained response.   Recommend: -Repeat labs today -Continue with first line therapy for as long as it benefits the patient: daily bicalutimide and q3 month IM leuprolide -F/U on day of Leuprolide injections  -Alternate systemic therapy options would include HER2 directed therapy such as trastuzumab in combination with chemotherapy (carboplatin and paclitaxel). He is not a great candidate for chemotherapy given his functional status -Can plan on obtaining his next restaging scan in June if he remains clinically stable with CT Neck and Chest - Check out form given to patient with instructions for making appointments.

## 2024-02-22 ENCOUNTER — OUTPATIENT (OUTPATIENT)
Dept: OUTPATIENT SERVICES | Facility: HOSPITAL | Age: 74
LOS: 1 days | Discharge: HOME | End: 2024-02-22

## 2024-02-22 DIAGNOSIS — D49.0 NEOPLASM OF UNSPECIFIED BEHAVIOR OF DIGESTIVE SYSTEM: Chronic | ICD-10-CM

## 2024-02-22 DIAGNOSIS — Z90.49 ACQUIRED ABSENCE OF OTHER SPECIFIED PARTS OF DIGESTIVE TRACT: Chronic | ICD-10-CM

## 2024-02-22 DIAGNOSIS — Z95.0 PRESENCE OF CARDIAC PACEMAKER: Chronic | ICD-10-CM

## 2024-02-22 DIAGNOSIS — H26.9 UNSPECIFIED CATARACT: Chronic | ICD-10-CM

## 2024-02-22 DIAGNOSIS — C08.9 MALIGNANT NEOPLASM OF MAJOR SALIVARY GLAND, UNSPECIFIED: ICD-10-CM

## 2024-03-06 ENCOUNTER — RESULT REVIEW (OUTPATIENT)
Age: 74
End: 2024-03-06

## 2024-03-06 ENCOUNTER — APPOINTMENT (OUTPATIENT)
Dept: HEMATOLOGY ONCOLOGY | Facility: CLINIC | Age: 74
End: 2024-03-06
Payer: MEDICARE

## 2024-03-06 ENCOUNTER — APPOINTMENT (OUTPATIENT)
Dept: INFUSION THERAPY | Facility: HOSPITAL | Age: 74
End: 2024-03-06

## 2024-03-06 VITALS
DIASTOLIC BLOOD PRESSURE: 72 MMHG | WEIGHT: 121.69 LBS | RESPIRATION RATE: 16 BRPM | BODY MASS INDEX: 22.26 KG/M2 | TEMPERATURE: 96.6 F | SYSTOLIC BLOOD PRESSURE: 157 MMHG | OXYGEN SATURATION: 99 % | HEART RATE: 76 BPM

## 2024-03-06 LAB
BASOPHILS # BLD AUTO: 0.07 K/UL — SIGNIFICANT CHANGE UP (ref 0–0.2)
BASOPHILS NFR BLD AUTO: 1 % — SIGNIFICANT CHANGE UP (ref 0–2)
EOSINOPHIL # BLD AUTO: 0.42 K/UL — SIGNIFICANT CHANGE UP (ref 0–0.5)
EOSINOPHIL NFR BLD AUTO: 5.8 % — SIGNIFICANT CHANGE UP (ref 0–6)
HCT VFR BLD CALC: 38.1 % — LOW (ref 39–50)
HGB BLD-MCNC: 13.4 G/DL — SIGNIFICANT CHANGE UP (ref 13–17)
IMM GRANULOCYTES NFR BLD AUTO: 0.3 % — SIGNIFICANT CHANGE UP (ref 0–0.9)
LYMPHOCYTES # BLD AUTO: 0.78 K/UL — LOW (ref 1–3.3)
LYMPHOCYTES # BLD AUTO: 10.7 % — LOW (ref 13–44)
MCHC RBC-ENTMCNC: 31.4 PG — SIGNIFICANT CHANGE UP (ref 27–34)
MCHC RBC-ENTMCNC: 35.2 G/DL — SIGNIFICANT CHANGE UP (ref 32–36)
MCV RBC AUTO: 89.2 FL — SIGNIFICANT CHANGE UP (ref 80–100)
MONOCYTES # BLD AUTO: 0.7 K/UL — SIGNIFICANT CHANGE UP (ref 0–0.9)
MONOCYTES NFR BLD AUTO: 9.6 % — SIGNIFICANT CHANGE UP (ref 2–14)
NEUTROPHILS # BLD AUTO: 5.3 K/UL — SIGNIFICANT CHANGE UP (ref 1.8–7.4)
NEUTROPHILS NFR BLD AUTO: 72.6 % — SIGNIFICANT CHANGE UP (ref 43–77)
NRBC # BLD: 0 /100 WBCS — SIGNIFICANT CHANGE UP (ref 0–0)
PLATELET # BLD AUTO: 227 K/UL — SIGNIFICANT CHANGE UP (ref 150–400)
RBC # BLD: 4.27 M/UL — SIGNIFICANT CHANGE UP (ref 4.2–5.8)
RBC # FLD: 11.9 % — SIGNIFICANT CHANGE UP (ref 10.3–14.5)
WBC # BLD: 7.29 K/UL — SIGNIFICANT CHANGE UP (ref 3.8–10.5)
WBC # FLD AUTO: 7.29 K/UL — SIGNIFICANT CHANGE UP (ref 3.8–10.5)

## 2024-03-06 PROCEDURE — 99214 OFFICE O/P EST MOD 30 MIN: CPT

## 2024-03-06 PROCEDURE — G2211 COMPLEX E/M VISIT ADD ON: CPT

## 2024-03-06 RX ORDER — AMLODIPINE BESYLATE 10 MG/1
10 TABLET ORAL
Refills: 0 | Status: ACTIVE | COMMUNITY

## 2024-03-06 RX ORDER — BICALUTAMIDE 50 MG/1
50 TABLET ORAL DAILY
Qty: 30 | Refills: 5 | Status: ACTIVE | COMMUNITY
Start: 2023-04-05 | End: 1900-01-01

## 2024-03-07 DIAGNOSIS — Z51.11 ENCOUNTER FOR ANTINEOPLASTIC CHEMOTHERAPY: ICD-10-CM

## 2024-03-07 LAB
ALBUMIN SERPL ELPH-MCNC: 5.1 G/DL
ALP BLD-CCNC: 127 U/L
ALT SERPL-CCNC: 22 U/L
ANION GAP SERPL CALC-SCNC: 15 MMOL/L
AST SERPL-CCNC: 25 U/L
BILIRUB SERPL-MCNC: 0.3 MG/DL
BUN SERPL-MCNC: 21 MG/DL
CALCIUM SERPL-MCNC: 10.4 MG/DL
CHLORIDE SERPL-SCNC: 98 MMOL/L
CO2 SERPL-SCNC: 22 MMOL/L
CREAT SERPL-MCNC: 1.16 MG/DL
EGFR: 66 ML/MIN/1.73M2
GLUCOSE SERPL-MCNC: 155 MG/DL
POTASSIUM SERPL-SCNC: 4.7 MMOL/L
PROT SERPL-MCNC: 7.6 G/DL
SODIUM SERPL-SCNC: 135 MMOL/L

## 2024-03-07 NOTE — GOALS
[FreeTextEntry1] : Mahdi Villa [FreeTextEntry2] : [facility] : [phone] [FreeTextEntry6] : Foreign Gutierres [FreeTextEntry5] : Spouse [FreeTextEntry7] :  Form provided.  Copy to be provided once completed / Copy in chart  Explained via  # [id number]

## 2024-03-08 NOTE — PHYSICAL EXAM
[Ambulatory and capable of all self care but unable to carry out any work activities] : Status 2- Ambulatory and capable of all self care but unable to carry out any work activities. Up and about more than 50% of waking hours [Thin] : thin [Normal] : affect appropriate [de-identified] : JEANNETTE  [de-identified] : No icterus [de-identified] : Clear [de-identified] : Post-treatment changes to Left Neck [de-identified] : S1 S2 [de-identified] : Softly distended NT, No masses  [de-identified] : No edema [de-identified] : No spine/CVA tenderness [de-identified] : Ambulatory

## 2024-03-08 NOTE — RESULTS/DATA
[FreeTextEntry1] : -CT Neck 11/3/21:  Stable follow-up CT examination without evidence of residual or recurrent tumor in the left parotidectomy bed.  Unchanged lytic lucency within the left side of the mandible of uncertain etiology. It demonstrated FDG uptake in the past. Additional carious left-sided maxillary second molar tooth. Correlate with dental examination.  No new or enlarging cervical lymph nodes.  Worsening left-sided mastoid air cell effusion.  -CT Chest 11/3/21:  New and enlarging left pulmonary metastases.  Enlarging right axillary chest wall metastasis.  -PET/CT 12/15/21:  Compared to FDG-PET/CT scan dated 5/28/2019: 1. Non-FDG-avid postsurgical changes of left sided parotidectomy and flap reconstruction and left neck dissection, unchanged. Resolution of ill-defined, mild FDG activity in parotid bed. 2. New, intense FDG activity in left greater than right temporalis muscle and right greater than left masseter and pterygoid muscles likely is related to bruxism. Please correlate clinically. A new focus of increased FDG activity inferior, and medial to left mastoid (SUV 6.6; image 47 of dedicated head and neck series), without definite corresponding abnormality on CT, may be physiologic activity. 3. Resolution of FDG activity in molar region of left mandible. Previously seen corresponding lucency in left mandibular alveolus is decreased in size (image 61). 4. New small FDG-avid left lower lobe pulmonary nodule, better delineated on recent diagnostic CT, is indeterminate. Comparison with recent diagnostic CT is limited due to differences in technique. Differential diagnosis includes inflammatory/infectious and metastatic disease. Additional subcentimeter nodule seen on recent diagnostic CT are not well evaluated due to CT technique, and are too small to characterize with PET. Nonspecific, tiny focus of mild FDG activity in left upper lobe is unchanged. 5. Focus of mild FDG activity in left perihilar region is significantly decreased in metabolism (SUV 2.9; image 86; previous SUV 6.0). Resolution of additional previously seen FDG-avid foci in bilateral hilar regions. 6. Nonspecific small, mildly FDG-avid focus of skin thickening in right axillary region, new as compared to prior PET/CT, and corresponding to location of 2 cm peripherally enhancing hypodense cutaneous lesion seen on CT dated 11/3/2021 (SUV 1.7; image 70), likely resolving inflammatory process.  -CT Chest 4/2/22:  Stable pulmonary nodules.  - CT Neck 7/12/22:  Status post left parotidectomy. No recurrent mass or abnormal enhancement is identified. No cervical lymphadenopathy.    -CT C/A/P 7/12/22:  Numerous left lung/pleural metastases, several of which have mildly enlarged (by 1-2 mm) since April 2022.  No metastatic disease in the abdomen or pelvis.  -CT Chest 10/3/22:  Several left lung and pleural metastases, some unchanged and others slightly larger.  -CT Chest 3/9/23:  Status post wedge resection in the left lower lobe.  Multiple nodules are noted in the left lower lobe as well as involving the left major fissure. They have increased in size when compared to previous exam.  -CT Chest 8/11/23:  Since 3/9/2023: Decreased size of left lower lobe and left pleural metastases. No new lung nodule.  -CT C/A/P 12/8/23:  Stable pulmonary and pleural-based left lower lobe metastatic pulmonary nodules compared with CT 8/11/2023 and substantially decreased compared with CT 3/9/2023.  No metastatic disease in the abdomen or pelvis.

## 2024-03-08 NOTE — HISTORY OF PRESENT ILLNESS
[Disease: _____________________] : Disease: [unfilled] [AJCC Stage: ____] : AJCC Stage: [unfilled] [de-identified] : - Lung LLL wedge resection/pleural biopsy/diaphragmatic nodule biopsy 5/11/2022: Adenocarcinoma consistent with salivary duct carcinoma.\par  IHC is positive for AR and HER2 positive at 3+. [de-identified] : Patient is status post left parotidectomy and neck dissection with reconstruction in April 2019 for a salivary duct carcinoma.  He was treated with adjuvant RT completed August 2019.  He has been followed with surveillance scans.  A CT chest in November 2021 revealed left-sided pulmonary nodules concerning for metastases.  PET/CT performed in December 2021 revealed the lung nodules to be indeterminant based on size; a right axillary focus was felt to be related to an inflammatory process.  Patient's family notes a right axillary abscess that was treated and improved following antibiotic therapy around this time.  Based on the scan findings, he was referred to thoracic surgery.  A follow-up CT chest in April 2022 revealed stable lung nodules however they were concerning for metastases.  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  Followed with surveillance scans.  Restaging CT March 2023 with continued increase in lung metastases.    Given the continued progression of his lung metastases and given the AR positivity of his tumor, started hormonal manipulation with a combination of leuprolide 11.25 mg IM every 3 months with bicalutamide 50 mg p.o. daily in April 2023.  He has a borderline functional status and therefore therapy targeting his AR receptor positivity was chosen.  Achieved DC.  [de-identified] : *[facility] Video  utilized.   *Patient's spouse present.   Patient started first-line combination leuprolide and bicalutamide targeting his AR receptor positivity in April 2023.  Achieved NH.   Patient reports ongoing weakness; he denies experiencing fatigue.  He is able to ambulate and carry out ADLs.  He does spend a significant amount of his day resting.  He manages constipation with Colace and Dulcolax.  He reports compliance with the bicalutamide medication. He overall reports feeling stable from his prior visit.

## 2024-03-08 NOTE — ASSESSMENT
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 Pathologically proven metastatic disease with pleural and lung metastases in May 2022. The patient's functional status has been borderline. Restaging CT March 2023 with continued increase in lung metastases. Given the continued progression of his lung metastases and given the AR positivity of his tumor, started hormonal manipulation with a combination of leuprolide 11.25 mg IM every 3 months with bicalutamide 50 mg p.o. daily in April 2023. He has a borderline functional status and therefore therapy targeting his AR receptor positivity was chosen.  Achieved [place].   Restaging CT C/A/P Dec 2023 with sustained response.   Recommend: -Continue with first line therapy for as long as it benefits the patient: daily bicalutimide and q3 month IM leuprolide -Repeat labs today -F/U on day of Leuprolide injections  -Alternate systemic therapy options would include HER2 directed therapy such as trastuzumab in combination with chemotherapy (carboplatin and paclitaxel). He is not a great candidate for chemotherapy given his functional status -- Follow-up prior to his next leuprolide injection in late May with restaging CT chest/neck obtained beforehand. - Check out form given to patient with instructions for making appointments.

## 2024-03-21 NOTE — PROGRESS NOTE ADULT - PROBLEM SELECTOR PROBLEM 2
Dyspnea Chronic. Gets monthly Actemra infusions (last infusion 2weeks ago)   - Continue home prednisone 2 mg daily  - Management per ICU

## 2024-05-21 ENCOUNTER — OUTPATIENT (OUTPATIENT)
Dept: OUTPATIENT SERVICES | Facility: HOSPITAL | Age: 74
LOS: 1 days | Discharge: HOME | End: 2024-05-21

## 2024-05-21 DIAGNOSIS — C08.9 MALIGNANT NEOPLASM OF MAJOR SALIVARY GLAND, UNSPECIFIED: ICD-10-CM

## 2024-05-21 DIAGNOSIS — Z90.49 ACQUIRED ABSENCE OF OTHER SPECIFIED PARTS OF DIGESTIVE TRACT: Chronic | ICD-10-CM

## 2024-05-21 DIAGNOSIS — H26.9 UNSPECIFIED CATARACT: Chronic | ICD-10-CM

## 2024-05-21 DIAGNOSIS — D49.0 NEOPLASM OF UNSPECIFIED BEHAVIOR OF DIGESTIVE SYSTEM: Chronic | ICD-10-CM

## 2024-05-21 DIAGNOSIS — Z95.0 PRESENCE OF CARDIAC PACEMAKER: Chronic | ICD-10-CM

## 2024-05-22 ENCOUNTER — APPOINTMENT (OUTPATIENT)
Dept: CT IMAGING | Facility: IMAGING CENTER | Age: 74
End: 2024-05-22
Payer: MEDICARE

## 2024-05-22 ENCOUNTER — OUTPATIENT (OUTPATIENT)
Dept: OUTPATIENT SERVICES | Facility: HOSPITAL | Age: 74
LOS: 1 days | End: 2024-05-22
Payer: COMMERCIAL

## 2024-05-22 DIAGNOSIS — D49.0 NEOPLASM OF UNSPECIFIED BEHAVIOR OF DIGESTIVE SYSTEM: Chronic | ICD-10-CM

## 2024-05-22 DIAGNOSIS — Z95.0 PRESENCE OF CARDIAC PACEMAKER: Chronic | ICD-10-CM

## 2024-05-22 DIAGNOSIS — H26.9 UNSPECIFIED CATARACT: Chronic | ICD-10-CM

## 2024-05-22 DIAGNOSIS — Z90.49 ACQUIRED ABSENCE OF OTHER SPECIFIED PARTS OF DIGESTIVE TRACT: Chronic | ICD-10-CM

## 2024-05-22 DIAGNOSIS — C08.9 MALIGNANT NEOPLASM OF MAJOR SALIVARY GLAND, UNSPECIFIED: ICD-10-CM

## 2024-05-22 PROCEDURE — 70491 CT SOFT TISSUE NECK W/DYE: CPT

## 2024-05-22 PROCEDURE — 70491 CT SOFT TISSUE NECK W/DYE: CPT | Mod: 26

## 2024-05-22 PROCEDURE — 71260 CT THORAX DX C+: CPT

## 2024-05-22 PROCEDURE — 71260 CT THORAX DX C+: CPT | Mod: 26

## 2024-05-29 ENCOUNTER — RESULT REVIEW (OUTPATIENT)
Age: 74
End: 2024-05-29

## 2024-05-29 ENCOUNTER — APPOINTMENT (OUTPATIENT)
Dept: HEMATOLOGY ONCOLOGY | Facility: CLINIC | Age: 74
End: 2024-05-29
Payer: MEDICARE

## 2024-05-29 ENCOUNTER — APPOINTMENT (OUTPATIENT)
Dept: INFUSION THERAPY | Facility: HOSPITAL | Age: 74
End: 2024-05-29

## 2024-05-29 VITALS
OXYGEN SATURATION: 99 % | WEIGHT: 124.78 LBS | DIASTOLIC BLOOD PRESSURE: 74 MMHG | RESPIRATION RATE: 16 BRPM | SYSTOLIC BLOOD PRESSURE: 149 MMHG | HEART RATE: 74 BPM | TEMPERATURE: 97.1 F | BODY MASS INDEX: 22.82 KG/M2

## 2024-05-29 DIAGNOSIS — C08.9 MALIGNANT NEOPLASM OF MAJOR SALIVARY GLAND, UNSPECIFIED: ICD-10-CM

## 2024-05-29 DIAGNOSIS — C78.2 SECONDARY MALIGNANT NEOPLASM OF PLEURA: ICD-10-CM

## 2024-05-29 DIAGNOSIS — C78.00 SECONDARY MALIGNANT NEOPLASM OF UNSPECIFIED LUNG: ICD-10-CM

## 2024-05-29 LAB
BASOPHILS # BLD AUTO: 0.06 K/UL — SIGNIFICANT CHANGE UP (ref 0–0.2)
BASOPHILS NFR BLD AUTO: 0.9 % — SIGNIFICANT CHANGE UP (ref 0–2)
EOSINOPHIL # BLD AUTO: 0.21 K/UL — SIGNIFICANT CHANGE UP (ref 0–0.5)
EOSINOPHIL NFR BLD AUTO: 3.1 % — SIGNIFICANT CHANGE UP (ref 0–6)
HCT VFR BLD CALC: 33.5 % — LOW (ref 39–50)
HGB BLD-MCNC: 11.7 G/DL — LOW (ref 13–17)
IMM GRANULOCYTES NFR BLD AUTO: 0.6 % — SIGNIFICANT CHANGE UP (ref 0–0.9)
LYMPHOCYTES # BLD AUTO: 0.8 K/UL — LOW (ref 1–3.3)
LYMPHOCYTES # BLD AUTO: 11.9 % — LOW (ref 13–44)
MCHC RBC-ENTMCNC: 31.2 PG — SIGNIFICANT CHANGE UP (ref 27–34)
MCHC RBC-ENTMCNC: 34.9 G/DL — SIGNIFICANT CHANGE UP (ref 32–36)
MCV RBC AUTO: 89.3 FL — SIGNIFICANT CHANGE UP (ref 80–100)
MONOCYTES # BLD AUTO: 0.65 K/UL — SIGNIFICANT CHANGE UP (ref 0–0.9)
MONOCYTES NFR BLD AUTO: 9.7 % — SIGNIFICANT CHANGE UP (ref 2–14)
NEUTROPHILS # BLD AUTO: 4.97 K/UL — SIGNIFICANT CHANGE UP (ref 1.8–7.4)
NEUTROPHILS NFR BLD AUTO: 73.8 % — SIGNIFICANT CHANGE UP (ref 43–77)
NRBC # BLD: 0 /100 WBCS — SIGNIFICANT CHANGE UP (ref 0–0)
PLATELET # BLD AUTO: 207 K/UL — SIGNIFICANT CHANGE UP (ref 150–400)
RBC # BLD: 3.75 M/UL — LOW (ref 4.2–5.8)
RBC # FLD: 12.4 % — SIGNIFICANT CHANGE UP (ref 10.3–14.5)
WBC # BLD: 6.73 K/UL — SIGNIFICANT CHANGE UP (ref 3.8–10.5)
WBC # FLD AUTO: 6.73 K/UL — SIGNIFICANT CHANGE UP (ref 3.8–10.5)

## 2024-05-29 PROCEDURE — 99214 OFFICE O/P EST MOD 30 MIN: CPT

## 2024-05-29 PROCEDURE — G2211 COMPLEX E/M VISIT ADD ON: CPT

## 2024-05-30 ENCOUNTER — NON-APPOINTMENT (OUTPATIENT)
Age: 74
End: 2024-05-30

## 2024-05-30 DIAGNOSIS — Z51.11 ENCOUNTER FOR ANTINEOPLASTIC CHEMOTHERAPY: ICD-10-CM

## 2024-05-30 LAB
ALBUMIN SERPL ELPH-MCNC: 4.8 G/DL
ALP BLD-CCNC: 136 U/L
ALT SERPL-CCNC: 24 U/L
ANION GAP SERPL CALC-SCNC: 17 MMOL/L
AST SERPL-CCNC: 21 U/L
BILIRUB SERPL-MCNC: 0.2 MG/DL
BUN SERPL-MCNC: 23 MG/DL
CALCIUM SERPL-MCNC: 10.2 MG/DL
CHLORIDE SERPL-SCNC: 98 MMOL/L
CO2 SERPL-SCNC: 20 MMOL/L
CREAT SERPL-MCNC: 1.15 MG/DL
EGFR: 66 ML/MIN/1.73M2
GLUCOSE SERPL-MCNC: 291 MG/DL
POTASSIUM SERPL-SCNC: 4.7 MMOL/L
PROT SERPL-MCNC: 7.2 G/DL
SODIUM SERPL-SCNC: 135 MMOL/L

## 2024-06-04 NOTE — RESULTS/DATA
[FreeTextEntry1] : -CT Neck 11/3/21:  Stable follow-up CT examination without evidence of residual or recurrent tumor in the left parotidectomy bed.  Unchanged lytic lucency within the left side of the mandible of uncertain etiology. It demonstrated FDG uptake in the past. Additional carious left-sided maxillary second molar tooth. Correlate with dental examination.  No new or enlarging cervical lymph nodes.  Worsening left-sided mastoid air cell effusion.  -CT Chest 11/3/21:  New and enlarging left pulmonary metastases.  Enlarging right axillary chest wall metastasis.  -PET/CT 12/15/21:  Compared to FDG-PET/CT scan dated 5/28/2019: 1. Non-FDG-avid postsurgical changes of left sided parotidectomy and flap reconstruction and left neck dissection, unchanged. Resolution of ill-defined, mild FDG activity in parotid bed. 2. New, intense FDG activity in left greater than right temporalis muscle and right greater than left masseter and pterygoid muscles likely is related to bruxism. Please correlate clinically. A new focus of increased FDG activity inferior, and medial to left mastoid (SUV 6.6; image 47 of dedicated head and neck series), without definite corresponding abnormality on CT, may be physiologic activity. 3. Resolution of FDG activity in molar region of left mandible. Previously seen corresponding lucency in left mandibular alveolus is decreased in size (image 61). 4. New small FDG-avid left lower lobe pulmonary nodule, better delineated on recent diagnostic CT, is indeterminate. Comparison with recent diagnostic CT is limited due to differences in technique. Differential diagnosis includes inflammatory/infectious and metastatic disease. Additional subcentimeter nodule seen on recent diagnostic CT are not well evaluated due to CT technique, and are too small to characterize with PET. Nonspecific, tiny focus of mild FDG activity in left upper lobe is unchanged. 5. Focus of mild FDG activity in left perihilar region is significantly decreased in metabolism (SUV 2.9; image 86; previous SUV 6.0). Resolution of additional previously seen FDG-avid foci in bilateral hilar regions. 6. Nonspecific small, mildly FDG-avid focus of skin thickening in right axillary region, new as compared to prior PET/CT, and corresponding to location of 2 cm peripherally enhancing hypodense cutaneous lesion seen on CT dated 11/3/2021 (SUV 1.7; image 70), likely resolving inflammatory process.  -CT Chest 4/2/22:  Stable pulmonary nodules.  - CT Neck 7/12/22:  Status post left parotidectomy. No recurrent mass or abnormal enhancement is identified. No cervical lymphadenopathy.    -CT C/A/P 7/12/22:  Numerous left lung/pleural metastases, several of which have mildly enlarged (by 1-2 mm) since April 2022.  No metastatic disease in the abdomen or pelvis.  -CT Chest 10/3/22:  Several left lung and pleural metastases, some unchanged and others slightly larger.  -CT Chest 3/9/23:  Status post wedge resection in the left lower lobe.  Multiple nodules are noted in the left lower lobe as well as involving the left major fissure. They have increased in size when compared to previous exam.  -CT Chest 8/11/23:  Since 3/9/2023: Decreased size of left lower lobe and left pleural metastases. No new lung nodule.  -CT C/A/P 12/8/23:  Stable pulmonary and pleural-based left lower lobe metastatic pulmonary nodules compared with CT 8/11/2023 and substantially decreased compared with CT 3/9/2023.  No metastatic disease in the abdomen or pelvis.  Images Reviewed/Interpreted:  -CT Chest 5/22/24:  Stable 1.2 cm elongated opacity and 0.6 cm subpleural nodule within basilar left lower lobe. No new or enlarging nodule.  -CT Neck 5/22/24:  Left parotidectomy. No recurrent mass or lymphadenopathy identified.

## 2024-06-04 NOTE — PHYSICAL EXAM
[Ambulatory and capable of all self care but unable to carry out any work activities] : Status 2- Ambulatory and capable of all self care but unable to carry out any work activities. Up and about more than 50% of waking hours [Thin] : thin [Normal] : affect appropriate [de-identified] : No icterus [de-identified] : JEANNETTE  [de-identified] : Post-treatment changes to Left Neck [de-identified] : Clear [de-identified] : S1 S2 [de-identified] : No edema [de-identified] : Softly distended NT, No masses  [de-identified] : No spine/CVA tenderness [de-identified] : Ambulatory [de-identified] : small sub-q palp nodule in right axillary region

## 2024-06-04 NOTE — HISTORY OF PRESENT ILLNESS
[Disease: _____________________] : Disease: [unfilled] [AJCC Stage: ____] : AJCC Stage: [unfilled] [de-identified] : Patient is status post left parotidectomy and neck dissection with reconstruction in April 2019 for a salivary duct carcinoma.  He was treated with adjuvant RT completed August 2019.  He has been followed with surveillance scans.  A CT chest in November 2021 revealed left-sided pulmonary nodules concerning for metastases.  PET/CT performed in December 2021 revealed the lung nodules to be indeterminant based on size; a right axillary focus was felt to be related to an inflammatory process.  Patient's family notes a right axillary abscess that was treated and improved following antibiotic therapy around this time.  Based on the scan findings, he was referred to thoracic surgery.  A follow-up CT chest in April 2022 revealed stable lung nodules however they were concerning for metastases.  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  Followed with surveillance scans.  Restaging CT March 2023 with continued increase in lung metastases.    Given the continued progression of his lung metastases and given the AR positivity of his tumor, started hormonal manipulation with a combination of leuprolide 11.25 mg IM every 3 months with bicalutamide 50 mg p.o. daily in April 2023.  He has a borderline functional status and therefore therapy targeting his AR receptor positivity was chosen.  Achieved [place].  [de-identified] : - Lung LLL wedge resection/pleural biopsy/diaphragmatic nodule biopsy 5/11/2022: Adenocarcinoma consistent with salivary duct carcinoma.\par  IHC is positive for AR and HER2 positive at 3+. [de-identified] : *[employer]  #[id number] utilized.   *Patient's spouse present.   Patient started first-line combination leuprolide and bicalutamide targeting his AR receptor positivity in April 2023.  Achieved CT.   Reports an improved clinical status.  He has been more active and walking more.  He does report some continued weakness.  Appetite improved and he gained some weight.  He reports compliance with the bicalutamide medication. Reports undergoing a colonoscopy in April with a few polyps removed and was otherwise negative. He reports some sputum production in the mornings that is brownish in color. He reports a right axillary nodule on the skin which was in an area of a prior abscess in 2023; discussed follow-up with his PCP or dermatology.  Restaging CT chest/neck with overall sustained response.

## 2024-06-04 NOTE — ASSESSMENT
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 Pathologically proven metastatic disease with pleural and lung metastases in May 2022. The patient's functional status has been borderline. Restaging CT March 2023 with continued increase in lung metastases. Given the continued progression of his lung metastases and given the AR positivity of his tumor, started hormonal manipulation with a combination of leuprolide 11.25 mg IM every 3 months with bicalutamide 50 mg p.o. daily in April 2023. He has a borderline functional status and therefore therapy targeting his AR receptor positivity was chosen.  Achieved ND.   Restaging CT Chest/Neck May 2024 with sustained response.   Recommend: -Continue with first line therapy for as long as it benefits the patient: daily bicalutimide and q3 month IM leuprolide.  For leuprolide injection today.   -Repeat labs today -F/U on day of Leuprolide injections  -Alternate systemic therapy options would include HER2 directed therapy such as trastuzumab in combination with chemotherapy (carboplatin and paclitaxel). He is not a great candidate for chemotherapy given his functional status -- Follow-up prior to his next leuprolide injection in 12 weeks or sooner should problems arise -Can plan on obtaining his next restaging scans in November if he remains clinically stable  -Recommended f/u with Derm or PCP for right axillary complaints  - Check out form given to patient with instructions for making appointments.

## 2024-06-27 ENCOUNTER — APPOINTMENT (OUTPATIENT)
Dept: ELECTROPHYSIOLOGY | Facility: CLINIC | Age: 74
End: 2024-06-27
Payer: MEDICARE

## 2024-06-27 ENCOUNTER — NON-APPOINTMENT (OUTPATIENT)
Age: 74
End: 2024-06-27

## 2024-06-27 VITALS
HEART RATE: 65 BPM | DIASTOLIC BLOOD PRESSURE: 70 MMHG | WEIGHT: 120 LBS | HEIGHT: 62 IN | BODY MASS INDEX: 22.08 KG/M2 | OXYGEN SATURATION: 99 % | SYSTOLIC BLOOD PRESSURE: 146 MMHG

## 2024-06-27 DIAGNOSIS — I49.5 SICK SINUS SYNDROME: ICD-10-CM

## 2024-06-27 DIAGNOSIS — Z95.0 PRESENCE OF CARDIAC PACEMAKER: ICD-10-CM

## 2024-06-27 DIAGNOSIS — I10 ESSENTIAL (PRIMARY) HYPERTENSION: ICD-10-CM

## 2024-06-27 DIAGNOSIS — E78.5 HYPERLIPIDEMIA, UNSPECIFIED: ICD-10-CM

## 2024-06-27 PROCEDURE — 93000 ELECTROCARDIOGRAM COMPLETE: CPT

## 2024-06-27 PROCEDURE — 99205 OFFICE O/P NEW HI 60 MIN: CPT | Mod: 25

## 2024-08-12 ENCOUNTER — OUTPATIENT (OUTPATIENT)
Dept: OUTPATIENT SERVICES | Facility: HOSPITAL | Age: 74
LOS: 1 days | Discharge: HOME | End: 2024-08-12

## 2024-08-12 DIAGNOSIS — D49.0 NEOPLASM OF UNSPECIFIED BEHAVIOR OF DIGESTIVE SYSTEM: Chronic | ICD-10-CM

## 2024-08-12 DIAGNOSIS — Z90.49 ACQUIRED ABSENCE OF OTHER SPECIFIED PARTS OF DIGESTIVE TRACT: Chronic | ICD-10-CM

## 2024-08-12 DIAGNOSIS — C08.9 MALIGNANT NEOPLASM OF MAJOR SALIVARY GLAND, UNSPECIFIED: ICD-10-CM

## 2024-08-12 DIAGNOSIS — H26.9 UNSPECIFIED CATARACT: Chronic | ICD-10-CM

## 2024-08-12 DIAGNOSIS — Z95.0 PRESENCE OF CARDIAC PACEMAKER: Chronic | ICD-10-CM

## 2024-08-15 ENCOUNTER — NON-APPOINTMENT (OUTPATIENT)
Age: 74
End: 2024-08-15

## 2024-08-21 ENCOUNTER — APPOINTMENT (OUTPATIENT)
Dept: HEMATOLOGY ONCOLOGY | Facility: CLINIC | Age: 74
End: 2024-08-21
Payer: MEDICARE

## 2024-08-21 ENCOUNTER — APPOINTMENT (OUTPATIENT)
Dept: INFUSION THERAPY | Facility: HOSPITAL | Age: 74
End: 2024-08-21

## 2024-08-21 ENCOUNTER — RESULT REVIEW (OUTPATIENT)
Age: 74
End: 2024-08-21

## 2024-08-21 VITALS
HEART RATE: 80 BPM | BODY MASS INDEX: 22.58 KG/M2 | SYSTOLIC BLOOD PRESSURE: 145 MMHG | OXYGEN SATURATION: 99 % | WEIGHT: 123.46 LBS | TEMPERATURE: 98.3 F | RESPIRATION RATE: 16 BRPM | DIASTOLIC BLOOD PRESSURE: 68 MMHG

## 2024-08-21 LAB
BASOPHILS # BLD AUTO: 0.07 K/UL — SIGNIFICANT CHANGE UP (ref 0–0.2)
BASOPHILS NFR BLD AUTO: 0.9 % — SIGNIFICANT CHANGE UP (ref 0–2)
EOSINOPHIL # BLD AUTO: 0.19 K/UL — SIGNIFICANT CHANGE UP (ref 0–0.5)
EOSINOPHIL NFR BLD AUTO: 2.4 % — SIGNIFICANT CHANGE UP (ref 0–6)
HCT VFR BLD CALC: 39.4 % — SIGNIFICANT CHANGE UP (ref 39–50)
HGB BLD-MCNC: 13.8 G/DL — SIGNIFICANT CHANGE UP (ref 13–17)
IMM GRANULOCYTES NFR BLD AUTO: 0.4 % — SIGNIFICANT CHANGE UP (ref 0–0.9)
LYMPHOCYTES # BLD AUTO: 0.91 K/UL — LOW (ref 1–3.3)
LYMPHOCYTES # BLD AUTO: 11.3 % — LOW (ref 13–44)
MCHC RBC-ENTMCNC: 30.8 PG — SIGNIFICANT CHANGE UP (ref 27–34)
MCHC RBC-ENTMCNC: 35 G/DL — SIGNIFICANT CHANGE UP (ref 32–36)
MCV RBC AUTO: 87.9 FL — SIGNIFICANT CHANGE UP (ref 80–100)
MONOCYTES # BLD AUTO: 0.58 K/UL — SIGNIFICANT CHANGE UP (ref 0–0.9)
MONOCYTES NFR BLD AUTO: 7.2 % — SIGNIFICANT CHANGE UP (ref 2–14)
NEUTROPHILS # BLD AUTO: 6.26 K/UL — SIGNIFICANT CHANGE UP (ref 1.8–7.4)
NEUTROPHILS NFR BLD AUTO: 77.8 % — HIGH (ref 43–77)
NRBC # BLD: 0 /100 WBCS — SIGNIFICANT CHANGE UP (ref 0–0)
PLATELET # BLD AUTO: 243 K/UL — SIGNIFICANT CHANGE UP (ref 150–400)
RBC # BLD: 4.48 M/UL — SIGNIFICANT CHANGE UP (ref 4.2–5.8)
RBC # FLD: 12.2 % — SIGNIFICANT CHANGE UP (ref 10.3–14.5)
WBC # BLD: 8.04 K/UL — SIGNIFICANT CHANGE UP (ref 3.8–10.5)
WBC # FLD AUTO: 8.04 K/UL — SIGNIFICANT CHANGE UP (ref 3.8–10.5)

## 2024-08-21 PROCEDURE — 99214 OFFICE O/P EST MOD 30 MIN: CPT

## 2024-08-21 NOTE — PHYSICAL EXAM
[Ambulatory and capable of all self care but unable to carry out any work activities] : Status 2- Ambulatory and capable of all self care but unable to carry out any work activities. Up and about more than 50% of waking hours [Thin] : thin [Normal] : affect appropriate [de-identified] : No icterus [de-identified] : JEANNETTE  [de-identified] : Post-treatment changes to Left Neck [de-identified] : Clear [de-identified] : S1 S2 [de-identified] : No edema [de-identified] : Softly distended NT, No masses  [de-identified] : No spine/CVA tenderness [de-identified] : Ambulatory [de-identified] : small sub-q palp nodule in right axillary region

## 2024-08-21 NOTE — ASSESSMENT
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 Pathologically proven metastatic disease with pleural and lung metastases in May 2022. The patient's functional status has been borderline. Restaging CT March 2023 with continued increase in lung metastases. Given the continued progression of his lung metastases and given the AR positivity of his tumor, started hormonal manipulation with a combination of leuprolide 11.25 mg IM every 3 months with bicalutamide 50 mg p.o. daily in April 2023. He has a borderline functional status and therefore therapy targeting his AR receptor positivity was chosen.  Achieved MD.   Restaging CT Chest/Neck May 2024 with sustained response.   Recommend: -Continue with first line therapy for as long as it benefits the patient: daily bicalutimide and q3 month IM leuprolide.  For leuprolide injection today.   -Repeat labs today -F/U on day of Leuprolide injections  -Alternate systemic therapy options would include HER2 directed therapy such as trastuzumab in combination with chemotherapy (carboplatin and paclitaxel). He is not a great candidate for chemotherapy given his functional status -- Follow-up prior to his next leuprolide injection in 12 weeks or sooner should problems arise -Can plan on obtaining his next restaging scans in November if he remains clinically stable - Orders placed today for CT imaging  -Recommended f/u with Derm or PCP for right axillary complaints  - Check out form given to patient with instructions for making appointments.  Next appointment to be made with Dr. Dickson to review restaging imaging.

## 2024-08-21 NOTE — HISTORY OF PRESENT ILLNESS
[Disease: _____________________] : Disease: [unfilled] [AJCC Stage: ____] : AJCC Stage: [unfilled] [de-identified] : Patient is status post left parotidectomy and neck dissection with reconstruction in April 2019 for a salivary duct carcinoma.  He was treated with adjuvant RT completed August 2019.  He has been followed with surveillance scans.  A CT chest in November 2021 revealed left-sided pulmonary nodules concerning for metastases.  PET/CT performed in December 2021 revealed the lung nodules to be indeterminant based on size; a right axillary focus was felt to be related to an inflammatory process.  Patient's family notes a right axillary abscess that was treated and improved following antibiotic therapy around this time.  Based on the scan findings, he was referred to thoracic surgery.  A follow-up CT chest in April 2022 revealed stable lung nodules however they were concerning for metastases.  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  Followed with surveillance scans.  Restaging CT March 2023 with continued increase in lung metastases.    Given the continued progression of his lung metastases and given the AR positivity of his tumor, started hormonal manipulation with a combination of leuprolide 11.25 mg IM every 3 months with bicalutamide 50 mg p.o. daily in April 2023.  He has a borderline functional status and therefore therapy targeting his AR receptor positivity was chosen.  Achieved NC.  [de-identified] : - Lung LLL wedge resection/pleural biopsy/diaphragmatic nodule biopsy 5/11/2022: Adenocarcinoma consistent with salivary duct carcinoma.\par  IHC is positive for AR and HER2 positive at 3+. [de-identified] : *[employer]  #[id number] utilized.   *Patient's spouse present.   Patient started first-line combination leuprolide and bicalutamide targeting his AR receptor positivity in April 2023.  Achieved SC.   Reports an improved clinical status.  He has been more active and walking more.  He does report some continued weakness.  Appetite improved and he gained some weight.  He reports compliance with the bicalutamide medication. Recent skin infection treated with antibiotics Reports undergoing a colonoscopy in April with a few polyps removed and was otherwise negative. Remains active. Appetite waxes and wanes. Weight stable.  He reports a right axillary nodule on the skin which was in an area of a prior abscess in 2023; discussed follow-up with his PCP or dermatology.  Restaging CT chest/neck with overall sustained response.

## 2024-08-22 DIAGNOSIS — Z51.11 ENCOUNTER FOR ANTINEOPLASTIC CHEMOTHERAPY: ICD-10-CM

## 2024-08-22 LAB
ALBUMIN SERPL ELPH-MCNC: 5.3 G/DL
ALP BLD-CCNC: 129 U/L
ALT SERPL-CCNC: 23 U/L
ANION GAP SERPL CALC-SCNC: 19 MMOL/L
AST SERPL-CCNC: 26 U/L
BILIRUB SERPL-MCNC: 0.3 MG/DL
BUN SERPL-MCNC: 20 MG/DL
CALCIUM SERPL-MCNC: 10.4 MG/DL
CHLORIDE SERPL-SCNC: 96 MMOL/L
CO2 SERPL-SCNC: 20 MMOL/L
CREAT SERPL-MCNC: 1.03 MG/DL
EGFR: 76 ML/MIN/1.73M2
GLUCOSE SERPL-MCNC: 202 MG/DL
POTASSIUM SERPL-SCNC: 4.7 MMOL/L
PROT SERPL-MCNC: 8.2 G/DL
SODIUM SERPL-SCNC: 135 MMOL/L

## 2024-11-05 ENCOUNTER — APPOINTMENT (OUTPATIENT)
Dept: CT IMAGING | Facility: IMAGING CENTER | Age: 74
End: 2024-11-05
Payer: MEDICARE

## 2024-11-05 ENCOUNTER — OUTPATIENT (OUTPATIENT)
Dept: OUTPATIENT SERVICES | Facility: HOSPITAL | Age: 74
LOS: 1 days | End: 2024-11-05
Payer: COMMERCIAL

## 2024-11-05 DIAGNOSIS — C78.2 SECONDARY MALIGNANT NEOPLASM OF PLEURA: ICD-10-CM

## 2024-11-05 DIAGNOSIS — D49.0 NEOPLASM OF UNSPECIFIED BEHAVIOR OF DIGESTIVE SYSTEM: Chronic | ICD-10-CM

## 2024-11-05 DIAGNOSIS — C08.9 MALIGNANT NEOPLASM OF MAJOR SALIVARY GLAND, UNSPECIFIED: ICD-10-CM

## 2024-11-05 DIAGNOSIS — Z90.49 ACQUIRED ABSENCE OF OTHER SPECIFIED PARTS OF DIGESTIVE TRACT: Chronic | ICD-10-CM

## 2024-11-05 DIAGNOSIS — C78.00 SECONDARY MALIGNANT NEOPLASM OF UNSPECIFIED LUNG: ICD-10-CM

## 2024-11-05 DIAGNOSIS — H26.9 UNSPECIFIED CATARACT: Chronic | ICD-10-CM

## 2024-11-05 DIAGNOSIS — Z95.0 PRESENCE OF CARDIAC PACEMAKER: Chronic | ICD-10-CM

## 2024-11-05 PROCEDURE — 70491 CT SOFT TISSUE NECK W/DYE: CPT | Mod: 26

## 2024-11-05 PROCEDURE — 71260 CT THORAX DX C+: CPT

## 2024-11-05 PROCEDURE — 71260 CT THORAX DX C+: CPT | Mod: 26

## 2024-11-05 PROCEDURE — 70491 CT SOFT TISSUE NECK W/DYE: CPT

## 2024-11-06 ENCOUNTER — NON-APPOINTMENT (OUTPATIENT)
Age: 74
End: 2024-11-06

## 2024-11-11 ENCOUNTER — OUTPATIENT (OUTPATIENT)
Dept: OUTPATIENT SERVICES | Facility: HOSPITAL | Age: 74
LOS: 1 days | Discharge: HOME | End: 2024-11-11

## 2024-11-11 DIAGNOSIS — H26.9 UNSPECIFIED CATARACT: Chronic | ICD-10-CM

## 2024-11-11 DIAGNOSIS — D49.0 NEOPLASM OF UNSPECIFIED BEHAVIOR OF DIGESTIVE SYSTEM: Chronic | ICD-10-CM

## 2024-11-11 DIAGNOSIS — C08.9 MALIGNANT NEOPLASM OF MAJOR SALIVARY GLAND, UNSPECIFIED: ICD-10-CM

## 2024-11-11 DIAGNOSIS — Z90.49 ACQUIRED ABSENCE OF OTHER SPECIFIED PARTS OF DIGESTIVE TRACT: Chronic | ICD-10-CM

## 2024-11-11 DIAGNOSIS — Z95.0 PRESENCE OF CARDIAC PACEMAKER: Chronic | ICD-10-CM

## 2024-11-12 ENCOUNTER — APPOINTMENT (OUTPATIENT)
Dept: HEMATOLOGY ONCOLOGY | Facility: CLINIC | Age: 74
End: 2024-11-12
Payer: MEDICARE

## 2024-11-12 ENCOUNTER — APPOINTMENT (OUTPATIENT)
Dept: INFUSION THERAPY | Facility: HOSPITAL | Age: 74
End: 2024-11-12

## 2024-11-12 VITALS
RESPIRATION RATE: 16 BRPM | BODY MASS INDEX: 22.34 KG/M2 | HEART RATE: 73 BPM | SYSTOLIC BLOOD PRESSURE: 162 MMHG | WEIGHT: 122.13 LBS | OXYGEN SATURATION: 100 % | TEMPERATURE: 97.1 F | DIASTOLIC BLOOD PRESSURE: 77 MMHG

## 2024-11-12 DIAGNOSIS — C78.00 SECONDARY MALIGNANT NEOPLASM OF UNSPECIFIED LUNG: ICD-10-CM

## 2024-11-12 DIAGNOSIS — C78.2 SECONDARY MALIGNANT NEOPLASM OF PLEURA: ICD-10-CM

## 2024-11-12 DIAGNOSIS — C08.9 MALIGNANT NEOPLASM OF MAJOR SALIVARY GLAND, UNSPECIFIED: ICD-10-CM

## 2024-11-12 PROCEDURE — 99214 OFFICE O/P EST MOD 30 MIN: CPT

## 2024-11-12 PROCEDURE — G2211 COMPLEX E/M VISIT ADD ON: CPT

## 2024-12-26 ENCOUNTER — APPOINTMENT (OUTPATIENT)
Dept: ELECTROPHYSIOLOGY | Facility: CLINIC | Age: 74
End: 2024-12-26

## 2025-01-30 ENCOUNTER — OUTPATIENT (OUTPATIENT)
Dept: OUTPATIENT SERVICES | Facility: HOSPITAL | Age: 75
LOS: 1 days | Discharge: HOME | End: 2025-01-30

## 2025-01-30 DIAGNOSIS — D49.0 NEOPLASM OF UNSPECIFIED BEHAVIOR OF DIGESTIVE SYSTEM: Chronic | ICD-10-CM

## 2025-01-30 DIAGNOSIS — H26.9 UNSPECIFIED CATARACT: Chronic | ICD-10-CM

## 2025-01-30 DIAGNOSIS — Z95.0 PRESENCE OF CARDIAC PACEMAKER: Chronic | ICD-10-CM

## 2025-01-30 DIAGNOSIS — Z90.49 ACQUIRED ABSENCE OF OTHER SPECIFIED PARTS OF DIGESTIVE TRACT: Chronic | ICD-10-CM

## 2025-01-30 DIAGNOSIS — C08.9 MALIGNANT NEOPLASM OF MAJOR SALIVARY GLAND, UNSPECIFIED: ICD-10-CM

## 2025-02-04 ENCOUNTER — RESULT REVIEW (OUTPATIENT)
Age: 75
End: 2025-02-04

## 2025-02-04 ENCOUNTER — APPOINTMENT (OUTPATIENT)
Dept: INFUSION THERAPY | Facility: HOSPITAL | Age: 75
End: 2025-02-04

## 2025-02-04 ENCOUNTER — APPOINTMENT (OUTPATIENT)
Dept: HEMATOLOGY ONCOLOGY | Facility: CLINIC | Age: 75
End: 2025-02-04
Payer: MEDICARE

## 2025-02-04 VITALS
TEMPERATURE: 97.2 F | SYSTOLIC BLOOD PRESSURE: 163 MMHG | HEART RATE: 69 BPM | DIASTOLIC BLOOD PRESSURE: 61 MMHG | OXYGEN SATURATION: 100 % | RESPIRATION RATE: 16 BRPM | BODY MASS INDEX: 22.42 KG/M2 | WEIGHT: 122.57 LBS

## 2025-02-04 DIAGNOSIS — C78.00 SECONDARY MALIGNANT NEOPLASM OF UNSPECIFIED LUNG: ICD-10-CM

## 2025-02-04 DIAGNOSIS — C78.2 SECONDARY MALIGNANT NEOPLASM OF PLEURA: ICD-10-CM

## 2025-02-04 DIAGNOSIS — C08.9 MALIGNANT NEOPLASM OF MAJOR SALIVARY GLAND, UNSPECIFIED: ICD-10-CM

## 2025-02-04 LAB
BASOPHILS # BLD AUTO: 0.08 K/UL — SIGNIFICANT CHANGE UP (ref 0–0.2)
BASOPHILS NFR BLD AUTO: 1 % — SIGNIFICANT CHANGE UP (ref 0–2)
EOSINOPHIL # BLD AUTO: 0.26 K/UL — SIGNIFICANT CHANGE UP (ref 0–0.5)
EOSINOPHIL NFR BLD AUTO: 3.4 % — SIGNIFICANT CHANGE UP (ref 0–6)
HCT VFR BLD CALC: 36.2 % — LOW (ref 39–50)
HGB BLD-MCNC: 12.8 G/DL — LOW (ref 13–17)
IMM GRANULOCYTES NFR BLD AUTO: 0.4 % — SIGNIFICANT CHANGE UP (ref 0–0.9)
LYMPHOCYTES # BLD AUTO: 1.03 K/UL — SIGNIFICANT CHANGE UP (ref 1–3.3)
LYMPHOCYTES # BLD AUTO: 13.4 % — SIGNIFICANT CHANGE UP (ref 13–44)
MCHC RBC-ENTMCNC: 30.5 PG — SIGNIFICANT CHANGE UP (ref 27–34)
MCHC RBC-ENTMCNC: 35.4 G/DL — SIGNIFICANT CHANGE UP (ref 32–36)
MCV RBC AUTO: 86.2 FL — SIGNIFICANT CHANGE UP (ref 80–100)
MONOCYTES # BLD AUTO: 0.7 K/UL — SIGNIFICANT CHANGE UP (ref 0–0.9)
MONOCYTES NFR BLD AUTO: 9.1 % — SIGNIFICANT CHANGE UP (ref 2–14)
NEUTROPHILS # BLD AUTO: 5.6 K/UL — SIGNIFICANT CHANGE UP (ref 1.8–7.4)
NEUTROPHILS NFR BLD AUTO: 72.7 % — SIGNIFICANT CHANGE UP (ref 43–77)
NRBC # BLD: 0 /100 WBCS — SIGNIFICANT CHANGE UP (ref 0–0)
NRBC BLD-RTO: 0 /100 WBCS — SIGNIFICANT CHANGE UP (ref 0–0)
PLATELET # BLD AUTO: 245 K/UL — SIGNIFICANT CHANGE UP (ref 150–400)
RBC # BLD: 4.2 M/UL — SIGNIFICANT CHANGE UP (ref 4.2–5.8)
RBC # FLD: 11.9 % — SIGNIFICANT CHANGE UP (ref 10.3–14.5)
WBC # BLD: 7.7 K/UL — SIGNIFICANT CHANGE UP (ref 3.8–10.5)
WBC # FLD AUTO: 7.7 K/UL — SIGNIFICANT CHANGE UP (ref 3.8–10.5)

## 2025-02-04 PROCEDURE — 99214 OFFICE O/P EST MOD 30 MIN: CPT

## 2025-02-04 RX ORDER — MULTIVITAMIN WITH IRON,LIVER 50MG-0.4MG
TABLET ORAL
Refills: 0 | Status: ACTIVE | COMMUNITY

## 2025-02-05 LAB
ALBUMIN SERPL ELPH-MCNC: 4.7 G/DL
ALP BLD-CCNC: 139 U/L
ALT SERPL-CCNC: 20 U/L
ANION GAP SERPL CALC-SCNC: 14 MMOL/L
AST SERPL-CCNC: 19 U/L
BILIRUB SERPL-MCNC: 0.3 MG/DL
BUN SERPL-MCNC: 21 MG/DL
CALCIUM SERPL-MCNC: 10 MG/DL
CHLORIDE SERPL-SCNC: 99 MMOL/L
CO2 SERPL-SCNC: 21 MMOL/L
CREAT SERPL-MCNC: 1.07 MG/DL
EGFR: 72 ML/MIN/1.73M2
GLUCOSE SERPL-MCNC: 240 MG/DL
POTASSIUM SERPL-SCNC: 4.7 MMOL/L
PROT SERPL-MCNC: 7.3 G/DL
SODIUM SERPL-SCNC: 134 MMOL/L

## 2025-04-21 ENCOUNTER — OUTPATIENT (OUTPATIENT)
Dept: OUTPATIENT SERVICES | Facility: HOSPITAL | Age: 75
LOS: 1 days | End: 2025-04-21
Payer: COMMERCIAL

## 2025-04-21 ENCOUNTER — APPOINTMENT (OUTPATIENT)
Dept: CT IMAGING | Facility: IMAGING CENTER | Age: 75
End: 2025-04-21
Payer: MEDICARE

## 2025-04-21 DIAGNOSIS — C78.2 SECONDARY MALIGNANT NEOPLASM OF PLEURA: ICD-10-CM

## 2025-04-21 DIAGNOSIS — C78.00 SECONDARY MALIGNANT NEOPLASM OF UNSPECIFIED LUNG: ICD-10-CM

## 2025-04-21 DIAGNOSIS — H26.9 UNSPECIFIED CATARACT: Chronic | ICD-10-CM

## 2025-04-21 DIAGNOSIS — D49.0 NEOPLASM OF UNSPECIFIED BEHAVIOR OF DIGESTIVE SYSTEM: Chronic | ICD-10-CM

## 2025-04-21 DIAGNOSIS — Z90.49 ACQUIRED ABSENCE OF OTHER SPECIFIED PARTS OF DIGESTIVE TRACT: Chronic | ICD-10-CM

## 2025-04-21 DIAGNOSIS — C08.9 MALIGNANT NEOPLASM OF MAJOR SALIVARY GLAND, UNSPECIFIED: ICD-10-CM

## 2025-04-21 DIAGNOSIS — Z95.0 PRESENCE OF CARDIAC PACEMAKER: Chronic | ICD-10-CM

## 2025-04-21 PROCEDURE — 70491 CT SOFT TISSUE NECK W/DYE: CPT | Mod: 26

## 2025-04-21 PROCEDURE — 71260 CT THORAX DX C+: CPT | Mod: 26

## 2025-04-21 PROCEDURE — 71260 CT THORAX DX C+: CPT

## 2025-04-21 PROCEDURE — 70491 CT SOFT TISSUE NECK W/DYE: CPT

## 2025-04-24 ENCOUNTER — OUTPATIENT (OUTPATIENT)
Dept: OUTPATIENT SERVICES | Facility: HOSPITAL | Age: 75
LOS: 1 days | Discharge: HOME | End: 2025-04-24

## 2025-04-24 DIAGNOSIS — D49.0 NEOPLASM OF UNSPECIFIED BEHAVIOR OF DIGESTIVE SYSTEM: Chronic | ICD-10-CM

## 2025-04-24 DIAGNOSIS — Z90.49 ACQUIRED ABSENCE OF OTHER SPECIFIED PARTS OF DIGESTIVE TRACT: Chronic | ICD-10-CM

## 2025-04-24 DIAGNOSIS — C08.9 MALIGNANT NEOPLASM OF MAJOR SALIVARY GLAND, UNSPECIFIED: ICD-10-CM

## 2025-04-24 DIAGNOSIS — Z95.0 PRESENCE OF CARDIAC PACEMAKER: Chronic | ICD-10-CM

## 2025-04-24 DIAGNOSIS — H26.9 UNSPECIFIED CATARACT: Chronic | ICD-10-CM

## 2025-04-29 ENCOUNTER — APPOINTMENT (OUTPATIENT)
Dept: HEMATOLOGY ONCOLOGY | Facility: CLINIC | Age: 75
End: 2025-04-29
Payer: MEDICARE

## 2025-04-29 ENCOUNTER — APPOINTMENT (OUTPATIENT)
Dept: INFUSION THERAPY | Facility: HOSPITAL | Age: 75
End: 2025-04-29

## 2025-04-29 VITALS
BODY MASS INDEX: 22.45 KG/M2 | HEART RATE: 69 BPM | DIASTOLIC BLOOD PRESSURE: 82 MMHG | RESPIRATION RATE: 16 BRPM | WEIGHT: 122 LBS | TEMPERATURE: 98.1 F | HEIGHT: 62 IN | SYSTOLIC BLOOD PRESSURE: 147 MMHG | OXYGEN SATURATION: 99 %

## 2025-04-29 DIAGNOSIS — C08.9 MALIGNANT NEOPLASM OF MAJOR SALIVARY GLAND, UNSPECIFIED: ICD-10-CM

## 2025-04-29 DIAGNOSIS — C78.00 SECONDARY MALIGNANT NEOPLASM OF UNSPECIFIED LUNG: ICD-10-CM

## 2025-04-29 DIAGNOSIS — C78.2 SECONDARY MALIGNANT NEOPLASM OF PLEURA: ICD-10-CM

## 2025-04-29 PROCEDURE — G2211 COMPLEX E/M VISIT ADD ON: CPT

## 2025-04-29 PROCEDURE — 99214 OFFICE O/P EST MOD 30 MIN: CPT

## 2025-05-12 NOTE — ED ADULT NURSE NOTE - NS ED NOTE ABUSE SUSPICION NEGLECT YN
-continue taking cefpodoxime every 12 hours for the next 7 days (next dose due before bedtime)  -may alternate Tylenol and Motrin as needed for pain  -continue to monitor for any worsening abdominal pain or obvious blood cotes in your urine    Follow-up with your PCP as well as your oncologist for CT findings/enlarged lymph nodes.  
No

## 2025-07-20 ENCOUNTER — OUTPATIENT (OUTPATIENT)
Dept: OUTPATIENT SERVICES | Facility: HOSPITAL | Age: 75
LOS: 1 days | Discharge: HOME | End: 2025-07-20

## 2025-07-20 DIAGNOSIS — C08.9 MALIGNANT NEOPLASM OF MAJOR SALIVARY GLAND, UNSPECIFIED: ICD-10-CM

## 2025-07-20 DIAGNOSIS — D49.0 NEOPLASM OF UNSPECIFIED BEHAVIOR OF DIGESTIVE SYSTEM: Chronic | ICD-10-CM

## 2025-07-20 DIAGNOSIS — H26.9 UNSPECIFIED CATARACT: Chronic | ICD-10-CM

## 2025-07-20 DIAGNOSIS — Z90.49 ACQUIRED ABSENCE OF OTHER SPECIFIED PARTS OF DIGESTIVE TRACT: Chronic | ICD-10-CM

## 2025-07-20 DIAGNOSIS — Z95.0 PRESENCE OF CARDIAC PACEMAKER: Chronic | ICD-10-CM

## 2025-07-22 ENCOUNTER — APPOINTMENT (OUTPATIENT)
Dept: HEMATOLOGY ONCOLOGY | Facility: CLINIC | Age: 75
End: 2025-07-22
Payer: MEDICARE

## 2025-07-22 ENCOUNTER — APPOINTMENT (OUTPATIENT)
Dept: INFUSION THERAPY | Facility: HOSPITAL | Age: 75
End: 2025-07-22

## 2025-07-22 VITALS
RESPIRATION RATE: 16 BRPM | DIASTOLIC BLOOD PRESSURE: 76 MMHG | OXYGEN SATURATION: 99 % | TEMPERATURE: 97.3 F | BODY MASS INDEX: 22.43 KG/M2 | SYSTOLIC BLOOD PRESSURE: 166 MMHG | WEIGHT: 121.9 LBS | HEART RATE: 65 BPM | HEIGHT: 62 IN

## 2025-07-22 DIAGNOSIS — C08.9 MALIGNANT NEOPLASM OF MAJOR SALIVARY GLAND, UNSPECIFIED: ICD-10-CM

## 2025-07-22 DIAGNOSIS — C78.2 SECONDARY MALIGNANT NEOPLASM OF PLEURA: ICD-10-CM

## 2025-07-22 DIAGNOSIS — M79.18 MYALGIA, OTHER SITE: ICD-10-CM

## 2025-07-22 DIAGNOSIS — C78.00 SECONDARY MALIGNANT NEOPLASM OF UNSPECIFIED LUNG: ICD-10-CM

## 2025-07-22 PROCEDURE — 99214 OFFICE O/P EST MOD 30 MIN: CPT

## 2025-07-23 PROBLEM — M79.18 MUSCULOSKELETAL PAIN: Status: ACTIVE | Noted: 2025-07-23

## (undated) DEVICE — D HELP - CLEARVIEW CLEARIFY SYSTEM

## (undated) DEVICE — ENDOCATCH GENERAL 15MM (PURPLE)

## (undated) DEVICE — TUBING OLYMPUS INSUFFLATION

## (undated) DEVICE — MARKING PEN W RULER

## (undated) DEVICE — SUT SILK 2-0 30" SH

## (undated) DEVICE — TUBING SUCTION 20FT

## (undated) DEVICE — TUBING STRYKEFLOW II SUCTION / IRRIGATOR

## (undated) DEVICE — TROCAR COVIDIEN VERSAONE BLADELESS FIXATION 12MM STANDARD

## (undated) DEVICE — SUT VICRYL 4-0 27" PS-2 UNDYED

## (undated) DEVICE — XI DRAPE ARM

## (undated) DEVICE — CHEST DRAIN OASIS DRY SUCTION WATER SEAL

## (undated) DEVICE — XI OBTURATOR OPTICAL BLADELESS 8MM

## (undated) DEVICE — PACK ROBOTIC LIJ

## (undated) DEVICE — DRSG TEGADERM 4X4.75"

## (undated) DEVICE — VENODYNE/SCD SLEEVE CALF MEDIUM

## (undated) DEVICE — NDL HYPO REGULAR BEVEL 22G X 1.5" (TURQUOISE)

## (undated) DEVICE — TROCAR COVIDIEN VERSASTEP PLUS 15MM STANDARD

## (undated) DEVICE — SYR LUER LOK 20CC

## (undated) DEVICE — XI ARM CLIP APPLIER MEDIUM-LARGE

## (undated) DEVICE — ENDOCATCH GENERAL 10MM (PURPLE)

## (undated) DEVICE — ELCTR BOVIE TIP BLADE INSULATED 2.75" EDGE

## (undated) DEVICE — SOL IRR POUR NS 0.9% 500ML

## (undated) DEVICE — XI SEAL UNIV 5- 8 MM

## (undated) DEVICE — STERIS DEFENDO 3-PIECE KIT (AIR/WATER, SUCTION & BIOPSY VALVES)

## (undated) DEVICE — SUT PROLENE 0 30" CT-1

## (undated) DEVICE — DRSG CURITY GAUZE SPONGE 4 X 4" 12-PLY

## (undated) DEVICE — DRSG GAUZE PACKTNER ROLL

## (undated) DEVICE — DRSG MASTISOL

## (undated) DEVICE — XI DRAPE COLUMN

## (undated) DEVICE — XI ARM FORCEP FENESTRATED BIPOLAR 8MM

## (undated) DEVICE — TROCAR COVIDIEN VERSAPORT BLADELESS OPTICAL 12MM STANDARD

## (undated) DEVICE — GRASPER LAPA 5MMX35CM

## (undated) DEVICE — BLADE SURGICAL #10 STAINLESS

## (undated) DEVICE — GOWN XL

## (undated) DEVICE — SUT VICRYL 0 27" UR-6

## (undated) DEVICE — ELCTR BOVIE TIP BLADE INSULATED 6.5" EDGE